# Patient Record
Sex: FEMALE | Race: WHITE | HISPANIC OR LATINO
[De-identification: names, ages, dates, MRNs, and addresses within clinical notes are randomized per-mention and may not be internally consistent; named-entity substitution may affect disease eponyms.]

---

## 2017-01-26 ENCOUNTER — LABORATORY RESULT (OUTPATIENT)
Age: 77
End: 2017-01-26

## 2017-01-26 ENCOUNTER — APPOINTMENT (OUTPATIENT)
Dept: INTERNAL MEDICINE | Facility: HOSPITAL | Age: 77
End: 2017-01-26

## 2017-01-26 ENCOUNTER — OUTPATIENT (OUTPATIENT)
Dept: OUTPATIENT SERVICES | Facility: HOSPITAL | Age: 77
LOS: 1 days | End: 2017-01-26

## 2017-01-26 ENCOUNTER — RESULT CHARGE (OUTPATIENT)
Age: 77
End: 2017-01-26

## 2017-01-26 VITALS
HEIGHT: 63 IN | SYSTOLIC BLOOD PRESSURE: 120 MMHG | DIASTOLIC BLOOD PRESSURE: 60 MMHG | WEIGHT: 189 LBS | HEART RATE: 92 BPM | BODY MASS INDEX: 33.49 KG/M2

## 2017-01-26 DIAGNOSIS — Z90.711 ACQUIRED ABSENCE OF UTERUS WITH REMAINING CERVICAL STUMP: Chronic | ICD-10-CM

## 2017-01-26 DIAGNOSIS — E11.9 TYPE 2 DIABETES MELLITUS WITHOUT COMPLICATIONS: ICD-10-CM

## 2017-01-26 DIAGNOSIS — Z86.69 PERSONAL HISTORY OF OTHER DISEASES OF THE NERVOUS SYSTEM AND SENSE ORGANS: Chronic | ICD-10-CM

## 2017-01-26 LAB
ALBUMIN SERPL ELPH-MCNC: 4.1 G/DL — SIGNIFICANT CHANGE UP (ref 3.3–5)
ALP SERPL-CCNC: 70 U/L — SIGNIFICANT CHANGE UP (ref 40–120)
ALT FLD-CCNC: 17 U/L — SIGNIFICANT CHANGE UP (ref 4–33)
AST SERPL-CCNC: 20 U/L — SIGNIFICANT CHANGE UP (ref 4–32)
BASOPHILS # BLD AUTO: 0.03 K/UL — SIGNIFICANT CHANGE UP (ref 0–0.2)
BASOPHILS NFR BLD AUTO: 0.3 % — SIGNIFICANT CHANGE UP (ref 0–2)
BILIRUB SERPL-MCNC: 0.4 MG/DL — SIGNIFICANT CHANGE UP (ref 0.2–1.2)
BUN SERPL-MCNC: 16 MG/DL — SIGNIFICANT CHANGE UP (ref 7–23)
CALCIUM SERPL-MCNC: 9.6 MG/DL — SIGNIFICANT CHANGE UP (ref 8.4–10.5)
CHLORIDE SERPL-SCNC: 100 MMOL/L — SIGNIFICANT CHANGE UP (ref 98–107)
CO2 SERPL-SCNC: 24 MMOL/L — SIGNIFICANT CHANGE UP (ref 22–31)
CREAT SERPL-MCNC: 0.77 MG/DL — SIGNIFICANT CHANGE UP (ref 0.5–1.3)
EOSINOPHIL # BLD AUTO: 0.19 K/UL — SIGNIFICANT CHANGE UP (ref 0–0.5)
EOSINOPHIL NFR BLD AUTO: 1.9 % — SIGNIFICANT CHANGE UP (ref 0–6)
GLUCOSE BLDC GLUCOMTR-MCNC: 132
GLUCOSE SERPL-MCNC: 124 MG/DL — HIGH (ref 70–99)
HBA1C BLD-MCNC: 7 % — HIGH (ref 4–5.6)
HCT VFR BLD CALC: 42.5 % — SIGNIFICANT CHANGE UP (ref 34.5–45)
HGB BLD-MCNC: 13.5 G/DL — SIGNIFICANT CHANGE UP (ref 11.5–15.5)
IMM GRANULOCYTES NFR BLD AUTO: 0.2 % — SIGNIFICANT CHANGE UP (ref 0–1.5)
LYMPHOCYTES # BLD AUTO: 3.94 K/UL — HIGH (ref 1–3.3)
LYMPHOCYTES # BLD AUTO: 40.4 % — SIGNIFICANT CHANGE UP (ref 13–44)
MCHC RBC-ENTMCNC: 27.3 PG — SIGNIFICANT CHANGE UP (ref 27–34)
MCHC RBC-ENTMCNC: 31.8 % — LOW (ref 32–36)
MCV RBC AUTO: 86 FL — SIGNIFICANT CHANGE UP (ref 80–100)
MONOCYTES # BLD AUTO: 0.37 K/UL — SIGNIFICANT CHANGE UP (ref 0–0.9)
MONOCYTES NFR BLD AUTO: 3.8 % — SIGNIFICANT CHANGE UP (ref 2–14)
NEUTROPHILS # BLD AUTO: 5.2 K/UL — SIGNIFICANT CHANGE UP (ref 1.8–7.4)
NEUTROPHILS NFR BLD AUTO: 53.4 % — SIGNIFICANT CHANGE UP (ref 43–77)
PLATELET # BLD AUTO: 298 K/UL — SIGNIFICANT CHANGE UP (ref 150–400)
PMV BLD: 10.9 FL — SIGNIFICANT CHANGE UP (ref 7–13)
POTASSIUM SERPL-MCNC: 4 MMOL/L — SIGNIFICANT CHANGE UP (ref 3.5–5.3)
POTASSIUM SERPL-SCNC: 4 MMOL/L — SIGNIFICANT CHANGE UP (ref 3.5–5.3)
PROT SERPL-MCNC: 7.7 G/DL — SIGNIFICANT CHANGE UP (ref 6–8.3)
RBC # BLD: 4.94 M/UL — SIGNIFICANT CHANGE UP (ref 3.8–5.2)
RBC # FLD: 14.1 % — SIGNIFICANT CHANGE UP (ref 10.3–14.5)
SODIUM SERPL-SCNC: 139 MMOL/L — SIGNIFICANT CHANGE UP (ref 135–145)
WBC # BLD: 9.75 K/UL — SIGNIFICANT CHANGE UP (ref 3.8–10.5)
WBC # FLD AUTO: 9.75 K/UL — SIGNIFICANT CHANGE UP (ref 3.8–10.5)

## 2017-01-27 LAB
24R-OH-CALCIDIOL SERPL-MCNC: 14.3 NG/ML — LOW (ref 30–100)
CREAT UR-MCNC: 159 MG/DL — SIGNIFICANT CHANGE UP
MICROALBUMIN UR-MCNC: 3.8 MG/DL — SIGNIFICANT CHANGE UP
MICROALBUMIN/CREAT UR-RTO: 24 UG/MG — SIGNIFICANT CHANGE UP (ref 0–30)

## 2017-02-13 ENCOUNTER — RX RENEWAL (OUTPATIENT)
Age: 77
End: 2017-02-13

## 2017-02-27 ENCOUNTER — APPOINTMENT (OUTPATIENT)
Dept: INTERNAL MEDICINE | Facility: HOSPITAL | Age: 77
End: 2017-02-27

## 2017-02-27 ENCOUNTER — RESULT CHARGE (OUTPATIENT)
Age: 77
End: 2017-02-27

## 2017-02-27 ENCOUNTER — OUTPATIENT (OUTPATIENT)
Dept: OUTPATIENT SERVICES | Facility: HOSPITAL | Age: 77
LOS: 1 days | End: 2017-02-27

## 2017-02-27 VITALS
DIASTOLIC BLOOD PRESSURE: 68 MMHG | HEART RATE: 60 BPM | BODY MASS INDEX: 32.96 KG/M2 | WEIGHT: 186 LBS | HEIGHT: 63 IN | SYSTOLIC BLOOD PRESSURE: 102 MMHG

## 2017-02-27 DIAGNOSIS — Z86.69 PERSONAL HISTORY OF OTHER DISEASES OF THE NERVOUS SYSTEM AND SENSE ORGANS: Chronic | ICD-10-CM

## 2017-02-27 DIAGNOSIS — Z90.711 ACQUIRED ABSENCE OF UTERUS WITH REMAINING CERVICAL STUMP: Chronic | ICD-10-CM

## 2017-02-27 DIAGNOSIS — M54.5 LOW BACK PAIN: ICD-10-CM

## 2017-02-27 LAB — GLUCOSE BLDC GLUCOMTR-MCNC: 139

## 2017-02-27 RX ORDER — ERGOCALCIFEROL 1.25 MG/1
1.25 MG CAPSULE, LIQUID FILLED ORAL
Qty: 8 | Refills: 0 | Status: DISCONTINUED | COMMUNITY
Start: 2017-01-27 | End: 2017-02-27

## 2017-02-27 RX ORDER — MELOXICAM 15 MG/1
15 TABLET ORAL DAILY
Qty: 7 | Refills: 0 | Status: DISCONTINUED | COMMUNITY
Start: 2017-01-26 | End: 2017-02-27

## 2017-02-28 DIAGNOSIS — E11.40 TYPE 2 DIABETES MELLITUS WITH DIABETIC NEUROPATHY, UNSPECIFIED: ICD-10-CM

## 2017-02-28 DIAGNOSIS — E11.9 TYPE 2 DIABETES MELLITUS WITHOUT COMPLICATIONS: ICD-10-CM

## 2017-03-13 DIAGNOSIS — M54.5 LOW BACK PAIN: ICD-10-CM

## 2017-03-13 DIAGNOSIS — E66.9 OBESITY, UNSPECIFIED: ICD-10-CM

## 2017-03-13 DIAGNOSIS — I10 ESSENTIAL (PRIMARY) HYPERTENSION: ICD-10-CM

## 2017-03-13 DIAGNOSIS — R21 RASH AND OTHER NONSPECIFIC SKIN ERUPTION: ICD-10-CM

## 2017-03-17 ENCOUNTER — APPOINTMENT (OUTPATIENT)
Dept: DERMATOLOGY | Facility: HOSPITAL | Age: 77
End: 2017-03-17

## 2017-05-08 ENCOUNTER — OUTPATIENT (OUTPATIENT)
Dept: OUTPATIENT SERVICES | Facility: HOSPITAL | Age: 77
LOS: 1 days | End: 2017-05-08
Payer: MEDICARE

## 2017-05-08 ENCOUNTER — APPOINTMENT (OUTPATIENT)
Dept: INTERNAL MEDICINE | Facility: HOSPITAL | Age: 77
End: 2017-05-08

## 2017-05-08 VITALS — HEIGHT: 63 IN | WEIGHT: 187 LBS | BODY MASS INDEX: 33.13 KG/M2

## 2017-05-08 VITALS — DIASTOLIC BLOOD PRESSURE: 77 MMHG | HEART RATE: 84 BPM | SYSTOLIC BLOOD PRESSURE: 144 MMHG

## 2017-05-08 DIAGNOSIS — Z86.69 PERSONAL HISTORY OF OTHER DISEASES OF THE NERVOUS SYSTEM AND SENSE ORGANS: Chronic | ICD-10-CM

## 2017-05-08 DIAGNOSIS — Z87.2 PERSONAL HISTORY OF DISEASES OF THE SKIN AND SUBCUTANEOUS TISSUE: ICD-10-CM

## 2017-05-08 DIAGNOSIS — M25.511 PAIN IN RIGHT SHOULDER: ICD-10-CM

## 2017-05-08 DIAGNOSIS — R06.09 OTHER FORMS OF DYSPNEA: ICD-10-CM

## 2017-05-08 DIAGNOSIS — Z12.11 ENCOUNTER FOR SCREENING FOR MALIGNANT NEOPLASM OF COLON: ICD-10-CM

## 2017-05-08 DIAGNOSIS — N63 UNSPECIFIED LUMP IN BREAST: ICD-10-CM

## 2017-05-08 DIAGNOSIS — Z90.711 ACQUIRED ABSENCE OF UTERUS WITH REMAINING CERVICAL STUMP: Chronic | ICD-10-CM

## 2017-05-08 DIAGNOSIS — R21 RASH AND OTHER NONSPECIFIC SKIN ERUPTION: ICD-10-CM

## 2017-05-08 DIAGNOSIS — Z87.898 PERSONAL HISTORY OF OTHER SPECIFIED CONDITIONS: ICD-10-CM

## 2017-05-09 DIAGNOSIS — M54.5 LOW BACK PAIN: ICD-10-CM

## 2017-05-09 DIAGNOSIS — F41.9 ANXIETY DISORDER, UNSPECIFIED: ICD-10-CM

## 2017-05-09 DIAGNOSIS — E11.9 TYPE 2 DIABETES MELLITUS WITHOUT COMPLICATIONS: ICD-10-CM

## 2017-05-19 ENCOUNTER — OUTPATIENT (OUTPATIENT)
Dept: OUTPATIENT SERVICES | Facility: HOSPITAL | Age: 77
LOS: 1 days | End: 2017-05-19

## 2017-05-19 ENCOUNTER — APPOINTMENT (OUTPATIENT)
Dept: DERMATOLOGY | Facility: HOSPITAL | Age: 77
End: 2017-05-19

## 2017-05-19 VITALS
HEIGHT: 63 IN | DIASTOLIC BLOOD PRESSURE: 62 MMHG | WEIGHT: 187 LBS | SYSTOLIC BLOOD PRESSURE: 137 MMHG | HEART RATE: 97 BPM | BODY MASS INDEX: 33.13 KG/M2

## 2017-05-19 DIAGNOSIS — D22.9 MELANOCYTIC NEVI, UNSPECIFIED: ICD-10-CM

## 2017-05-19 DIAGNOSIS — L30.9 DERMATITIS, UNSPECIFIED: ICD-10-CM

## 2017-05-19 DIAGNOSIS — L21.9 SEBORRHEIC DERMATITIS, UNSPECIFIED: ICD-10-CM

## 2017-05-19 DIAGNOSIS — Z90.711 ACQUIRED ABSENCE OF UTERUS WITH REMAINING CERVICAL STUMP: Chronic | ICD-10-CM

## 2017-05-19 DIAGNOSIS — Z86.69 PERSONAL HISTORY OF OTHER DISEASES OF THE NERVOUS SYSTEM AND SENSE ORGANS: Chronic | ICD-10-CM

## 2017-06-01 ENCOUNTER — FORM ENCOUNTER (OUTPATIENT)
Age: 77
End: 2017-06-01

## 2017-06-02 ENCOUNTER — APPOINTMENT (OUTPATIENT)
Dept: RADIOLOGY | Facility: HOSPITAL | Age: 77
End: 2017-06-02

## 2017-06-02 PROCEDURE — 72100 X-RAY EXAM L-S SPINE 2/3 VWS: CPT | Mod: 26

## 2017-06-14 ENCOUNTER — APPOINTMENT (OUTPATIENT)
Dept: INTERNAL MEDICINE | Facility: HOSPITAL | Age: 77
End: 2017-06-14

## 2017-07-11 ENCOUNTER — RX RENEWAL (OUTPATIENT)
Age: 77
End: 2017-07-11

## 2017-09-07 ENCOUNTER — LABORATORY RESULT (OUTPATIENT)
Age: 77
End: 2017-09-07

## 2017-09-07 ENCOUNTER — OUTPATIENT (OUTPATIENT)
Dept: OUTPATIENT SERVICES | Facility: HOSPITAL | Age: 77
LOS: 1 days | End: 2017-09-07

## 2017-09-07 ENCOUNTER — APPOINTMENT (OUTPATIENT)
Dept: INTERNAL MEDICINE | Facility: HOSPITAL | Age: 77
End: 2017-09-07
Payer: MEDICARE

## 2017-09-07 VITALS — BODY MASS INDEX: 33.66 KG/M2 | WEIGHT: 190 LBS | HEIGHT: 63 IN

## 2017-09-07 VITALS — DIASTOLIC BLOOD PRESSURE: 76 MMHG | SYSTOLIC BLOOD PRESSURE: 134 MMHG

## 2017-09-07 DIAGNOSIS — E03.9 HYPOTHYROIDISM, UNSPECIFIED: ICD-10-CM

## 2017-09-07 DIAGNOSIS — Z86.69 PERSONAL HISTORY OF OTHER DISEASES OF THE NERVOUS SYSTEM AND SENSE ORGANS: Chronic | ICD-10-CM

## 2017-09-07 DIAGNOSIS — Z90.711 ACQUIRED ABSENCE OF UTERUS WITH REMAINING CERVICAL STUMP: Chronic | ICD-10-CM

## 2017-09-07 LAB
ALBUMIN SERPL ELPH-MCNC: 4.2 G/DL — SIGNIFICANT CHANGE UP (ref 3.3–5)
ALP SERPL-CCNC: 58 U/L — SIGNIFICANT CHANGE UP (ref 40–120)
ALT FLD-CCNC: 19 U/L — SIGNIFICANT CHANGE UP (ref 4–33)
AST SERPL-CCNC: 22 U/L — SIGNIFICANT CHANGE UP (ref 4–32)
BASOPHILS # BLD AUTO: 0.05 K/UL — SIGNIFICANT CHANGE UP (ref 0–0.2)
BASOPHILS NFR BLD AUTO: 0.5 % — SIGNIFICANT CHANGE UP (ref 0–2)
BILIRUB SERPL-MCNC: 0.3 MG/DL — SIGNIFICANT CHANGE UP (ref 0.2–1.2)
BUN SERPL-MCNC: 11 MG/DL — SIGNIFICANT CHANGE UP (ref 7–23)
CALCIUM SERPL-MCNC: 9.7 MG/DL — SIGNIFICANT CHANGE UP (ref 8.4–10.5)
CHLORIDE SERPL-SCNC: 98 MMOL/L — SIGNIFICANT CHANGE UP (ref 98–107)
CHOLEST SERPL-MCNC: 197 MG/DL — SIGNIFICANT CHANGE UP (ref 120–199)
CO2 SERPL-SCNC: 27 MMOL/L — SIGNIFICANT CHANGE UP (ref 22–31)
CREAT SERPL-MCNC: 0.95 MG/DL — SIGNIFICANT CHANGE UP (ref 0.5–1.3)
EOSINOPHIL # BLD AUTO: 0.11 K/UL — SIGNIFICANT CHANGE UP (ref 0–0.5)
EOSINOPHIL NFR BLD AUTO: 1 % — SIGNIFICANT CHANGE UP (ref 0–6)
GLUCOSE SERPL-MCNC: 123 MG/DL — HIGH (ref 70–99)
HBA1C BLD-MCNC: 7.3 % — HIGH (ref 4–5.6)
HCT VFR BLD CALC: 42.7 % — SIGNIFICANT CHANGE UP (ref 34.5–45)
HDLC SERPL-MCNC: 69 MG/DL — HIGH (ref 45–65)
HGB BLD-MCNC: 13.5 G/DL — SIGNIFICANT CHANGE UP (ref 11.5–15.5)
IMM GRANULOCYTES # BLD AUTO: 0.02 # — SIGNIFICANT CHANGE UP
IMM GRANULOCYTES NFR BLD AUTO: 0.2 % — SIGNIFICANT CHANGE UP (ref 0–1.5)
LIPID PNL WITH DIRECT LDL SERPL: 120 MG/DL — SIGNIFICANT CHANGE UP
LYMPHOCYTES # BLD AUTO: 4.67 K/UL — HIGH (ref 1–3.3)
LYMPHOCYTES # BLD AUTO: 44.5 % — HIGH (ref 13–44)
MCHC RBC-ENTMCNC: 26.9 PG — LOW (ref 27–34)
MCHC RBC-ENTMCNC: 31.6 % — LOW (ref 32–36)
MCV RBC AUTO: 85.1 FL — SIGNIFICANT CHANGE UP (ref 80–100)
MONOCYTES # BLD AUTO: 0.55 K/UL — SIGNIFICANT CHANGE UP (ref 0–0.9)
MONOCYTES NFR BLD AUTO: 5.2 % — SIGNIFICANT CHANGE UP (ref 2–14)
NEUTROPHILS # BLD AUTO: 5.1 K/UL — SIGNIFICANT CHANGE UP (ref 1.8–7.4)
NEUTROPHILS NFR BLD AUTO: 48.6 % — SIGNIFICANT CHANGE UP (ref 43–77)
NRBC # FLD: 0 — SIGNIFICANT CHANGE UP
PLATELET # BLD AUTO: 316 K/UL — SIGNIFICANT CHANGE UP (ref 150–400)
PMV BLD: 10.4 FL — SIGNIFICANT CHANGE UP (ref 7–13)
POTASSIUM SERPL-MCNC: 3.6 MMOL/L — SIGNIFICANT CHANGE UP (ref 3.5–5.3)
POTASSIUM SERPL-SCNC: 3.6 MMOL/L — SIGNIFICANT CHANGE UP (ref 3.5–5.3)
PROT SERPL-MCNC: 7.8 G/DL — SIGNIFICANT CHANGE UP (ref 6–8.3)
RBC # BLD: 5.02 M/UL — SIGNIFICANT CHANGE UP (ref 3.8–5.2)
RBC # FLD: 14.2 % — SIGNIFICANT CHANGE UP (ref 10.3–14.5)
SODIUM SERPL-SCNC: 141 MMOL/L — SIGNIFICANT CHANGE UP (ref 135–145)
TRIGL SERPL-MCNC: 129 MG/DL — SIGNIFICANT CHANGE UP (ref 10–149)
TSH SERPL-MCNC: 3.59 UIU/ML — SIGNIFICANT CHANGE UP (ref 0.27–4.2)
WBC # BLD: 10.5 K/UL — SIGNIFICANT CHANGE UP (ref 3.8–10.5)
WBC # FLD AUTO: 10.5 K/UL — SIGNIFICANT CHANGE UP (ref 3.8–10.5)

## 2017-09-07 PROCEDURE — 99214 OFFICE O/P EST MOD 30 MIN: CPT | Mod: GC

## 2017-09-08 DIAGNOSIS — Z23 ENCOUNTER FOR IMMUNIZATION: ICD-10-CM

## 2017-09-08 LAB — GLUCOSE BLDC GLUCOMTR-MCNC: 121

## 2017-09-11 DIAGNOSIS — Z00.00 ENCOUNTER FOR GENERAL ADULT MEDICAL EXAMINATION WITHOUT ABNORMAL FINDINGS: ICD-10-CM

## 2017-09-11 DIAGNOSIS — E11.40 TYPE 2 DIABETES MELLITUS WITH DIABETIC NEUROPATHY, UNSPECIFIED: ICD-10-CM

## 2017-09-15 ENCOUNTER — APPOINTMENT (OUTPATIENT)
Dept: INTERNAL MEDICINE | Facility: HOSPITAL | Age: 77
End: 2017-09-15

## 2017-09-25 ENCOUNTER — OUTPATIENT (OUTPATIENT)
Dept: OUTPATIENT SERVICES | Facility: HOSPITAL | Age: 77
LOS: 1 days | End: 2017-09-25

## 2017-09-25 ENCOUNTER — APPOINTMENT (OUTPATIENT)
Dept: INTERNAL MEDICINE | Facility: HOSPITAL | Age: 77
End: 2017-09-25
Payer: MEDICARE

## 2017-09-25 VITALS — WEIGHT: 186 LBS | HEIGHT: 63 IN | BODY MASS INDEX: 32.96 KG/M2

## 2017-09-25 VITALS — HEART RATE: 72 BPM | SYSTOLIC BLOOD PRESSURE: 122 MMHG | DIASTOLIC BLOOD PRESSURE: 80 MMHG

## 2017-09-25 DIAGNOSIS — E03.9 HYPOTHYROIDISM, UNSPECIFIED: ICD-10-CM

## 2017-09-25 DIAGNOSIS — Z00.00 ENCOUNTER FOR GENERAL ADULT MEDICAL EXAMINATION WITHOUT ABNORMAL FINDINGS: ICD-10-CM

## 2017-09-25 DIAGNOSIS — Z86.69 PERSONAL HISTORY OF OTHER DISEASES OF THE NERVOUS SYSTEM AND SENSE ORGANS: Chronic | ICD-10-CM

## 2017-09-25 DIAGNOSIS — Z90.711 ACQUIRED ABSENCE OF UTERUS WITH REMAINING CERVICAL STUMP: Chronic | ICD-10-CM

## 2017-09-25 DIAGNOSIS — E11.9 TYPE 2 DIABETES MELLITUS WITHOUT COMPLICATIONS: ICD-10-CM

## 2017-09-25 LAB — GLUCOSE BLDC GLUCOMTR-MCNC: 185

## 2017-09-25 PROCEDURE — 99214 OFFICE O/P EST MOD 30 MIN: CPT | Mod: GC

## 2017-09-26 ENCOUNTER — APPOINTMENT (OUTPATIENT)
Dept: INTERNAL MEDICINE | Facility: HOSPITAL | Age: 77
End: 2017-09-26

## 2017-10-26 ENCOUNTER — APPOINTMENT (OUTPATIENT)
Dept: GASTROENTEROLOGY | Facility: HOSPITAL | Age: 77
End: 2017-10-26

## 2017-11-20 ENCOUNTER — RX RENEWAL (OUTPATIENT)
Age: 77
End: 2017-11-20

## 2017-12-06 ENCOUNTER — APPOINTMENT (OUTPATIENT)
Dept: INTERNAL MEDICINE | Facility: HOSPITAL | Age: 77
End: 2017-12-06

## 2018-01-31 ENCOUNTER — RX RENEWAL (OUTPATIENT)
Age: 78
End: 2018-01-31

## 2018-02-06 ENCOUNTER — RX RENEWAL (OUTPATIENT)
Age: 78
End: 2018-02-06

## 2018-02-14 ENCOUNTER — LABORATORY RESULT (OUTPATIENT)
Age: 78
End: 2018-02-14

## 2018-02-14 ENCOUNTER — OUTPATIENT (OUTPATIENT)
Dept: OUTPATIENT SERVICES | Facility: HOSPITAL | Age: 78
LOS: 1 days | End: 2018-02-14

## 2018-02-14 ENCOUNTER — APPOINTMENT (OUTPATIENT)
Dept: INTERNAL MEDICINE | Facility: HOSPITAL | Age: 78
End: 2018-02-14
Payer: MEDICARE

## 2018-02-14 VITALS
BODY MASS INDEX: 32.6 KG/M2 | HEART RATE: 102 BPM | SYSTOLIC BLOOD PRESSURE: 102 MMHG | WEIGHT: 184 LBS | OXYGEN SATURATION: 99 % | HEIGHT: 63 IN | DIASTOLIC BLOOD PRESSURE: 62 MMHG

## 2018-02-14 VITALS — DIASTOLIC BLOOD PRESSURE: 64 MMHG | SYSTOLIC BLOOD PRESSURE: 104 MMHG

## 2018-02-14 DIAGNOSIS — Z90.711 ACQUIRED ABSENCE OF UTERUS WITH REMAINING CERVICAL STUMP: Chronic | ICD-10-CM

## 2018-02-14 DIAGNOSIS — F51.05 INSOMNIA DUE TO OTHER MENTAL DISORDER: ICD-10-CM

## 2018-02-14 DIAGNOSIS — Z86.69 PERSONAL HISTORY OF OTHER DISEASES OF THE NERVOUS SYSTEM AND SENSE ORGANS: Chronic | ICD-10-CM

## 2018-02-14 DIAGNOSIS — K08.89 OTHER SPECIFIED DISORDERS OF TEETH AND SUPPORTING STRUCTURES: ICD-10-CM

## 2018-02-14 DIAGNOSIS — F99 INSOMNIA DUE TO OTHER MENTAL DISORDER: ICD-10-CM

## 2018-02-14 DIAGNOSIS — E55.9 VITAMIN D DEFICIENCY, UNSPECIFIED: ICD-10-CM

## 2018-02-14 LAB
ALBUMIN SERPL ELPH-MCNC: 4.4 G/DL — SIGNIFICANT CHANGE UP (ref 3.3–5)
ALP SERPL-CCNC: 65 U/L — SIGNIFICANT CHANGE UP (ref 40–120)
ALT FLD-CCNC: 16 U/L — SIGNIFICANT CHANGE UP (ref 4–33)
ANISOCYTOSIS BLD QL: SLIGHT — SIGNIFICANT CHANGE UP
AST SERPL-CCNC: 20 U/L — SIGNIFICANT CHANGE UP (ref 4–32)
BASOPHILS # BLD AUTO: 0.07 K/UL — SIGNIFICANT CHANGE UP (ref 0–0.2)
BASOPHILS NFR BLD AUTO: 0.5 % — SIGNIFICANT CHANGE UP (ref 0–2)
BASOPHILS NFR SPEC: 1 % — SIGNIFICANT CHANGE UP (ref 0–2)
BILIRUB SERPL-MCNC: 0.3 MG/DL — SIGNIFICANT CHANGE UP (ref 0.2–1.2)
BUN SERPL-MCNC: 14 MG/DL — SIGNIFICANT CHANGE UP (ref 7–23)
CALCIUM SERPL-MCNC: 9.9 MG/DL — SIGNIFICANT CHANGE UP (ref 8.4–10.5)
CHLORIDE SERPL-SCNC: 98 MMOL/L — SIGNIFICANT CHANGE UP (ref 98–107)
CO2 SERPL-SCNC: 26 MMOL/L — SIGNIFICANT CHANGE UP (ref 22–31)
CREAT SERPL-MCNC: 1.01 MG/DL — SIGNIFICANT CHANGE UP (ref 0.5–1.3)
EOSINOPHIL # BLD AUTO: 0.16 K/UL — SIGNIFICANT CHANGE UP (ref 0–0.5)
EOSINOPHIL NFR BLD AUTO: 1.2 % — SIGNIFICANT CHANGE UP (ref 0–6)
EOSINOPHIL NFR FLD: 3 % — SIGNIFICANT CHANGE UP (ref 0–6)
GIANT PLATELETS BLD QL SMEAR: PRESENT — SIGNIFICANT CHANGE UP
GLUCOSE BLDC GLUCOMTR-MCNC: 165
GLUCOSE SERPL-MCNC: 160 MG/DL — HIGH (ref 70–99)
HBA1C BLD-MCNC: 7.1 % — HIGH (ref 4–5.6)
HCT VFR BLD CALC: 44.4 % — SIGNIFICANT CHANGE UP (ref 34.5–45)
HGB BLD-MCNC: 13.8 G/DL — SIGNIFICANT CHANGE UP (ref 11.5–15.5)
HYPOCHROMIA BLD QL: SLIGHT — SIGNIFICANT CHANGE UP
IMM GRANULOCYTES # BLD AUTO: 0.04 # — SIGNIFICANT CHANGE UP
IMM GRANULOCYTES NFR BLD AUTO: 0.3 % — SIGNIFICANT CHANGE UP (ref 0–1.5)
LYMPHOCYTES # BLD AUTO: 45 % — HIGH (ref 13–44)
LYMPHOCYTES # BLD AUTO: 5.79 K/UL — HIGH (ref 1–3.3)
LYMPHOCYTES NFR SPEC AUTO: 45 % — HIGH (ref 13–44)
MAGNESIUM SERPL-MCNC: 1.6 MG/DL — SIGNIFICANT CHANGE UP (ref 1.6–2.6)
MANUAL SMEAR VERIFICATION: SIGNIFICANT CHANGE UP
MCHC RBC-ENTMCNC: 26.3 PG — LOW (ref 27–34)
MCHC RBC-ENTMCNC: 31.1 % — LOW (ref 32–36)
MCV RBC AUTO: 84.7 FL — SIGNIFICANT CHANGE UP (ref 80–100)
MONOCYTES # BLD AUTO: 0.82 K/UL — SIGNIFICANT CHANGE UP (ref 0–0.9)
MONOCYTES NFR BLD AUTO: 6.4 % — SIGNIFICANT CHANGE UP (ref 2–14)
MONOCYTES NFR BLD: 5 % — SIGNIFICANT CHANGE UP (ref 2–9)
NEUTROPHIL AB SER-ACNC: 46 % — SIGNIFICANT CHANGE UP (ref 43–77)
NEUTROPHILS # BLD AUTO: 5.99 K/UL — SIGNIFICANT CHANGE UP (ref 1.8–7.4)
NEUTROPHILS NFR BLD AUTO: 46.6 % — SIGNIFICANT CHANGE UP (ref 43–77)
NRBC # BLD: 0 /100WBC — SIGNIFICANT CHANGE UP
NRBC # FLD: 0 — SIGNIFICANT CHANGE UP
PHOSPHATE SERPL-MCNC: 3 MG/DL — SIGNIFICANT CHANGE UP (ref 2.5–4.5)
PLATELET # BLD AUTO: 347 K/UL — SIGNIFICANT CHANGE UP (ref 150–400)
PLATELET COUNT - ESTIMATE: NORMAL — SIGNIFICANT CHANGE UP
PMV BLD: 10.3 FL — SIGNIFICANT CHANGE UP (ref 7–13)
POTASSIUM SERPL-MCNC: 3.9 MMOL/L — SIGNIFICANT CHANGE UP (ref 3.5–5.3)
POTASSIUM SERPL-SCNC: 3.9 MMOL/L — SIGNIFICANT CHANGE UP (ref 3.5–5.3)
PROT SERPL-MCNC: 7.9 G/DL — SIGNIFICANT CHANGE UP (ref 6–8.3)
RBC # BLD: 5.24 M/UL — HIGH (ref 3.8–5.2)
RBC # FLD: 13.9 % — SIGNIFICANT CHANGE UP (ref 10.3–14.5)
SODIUM SERPL-SCNC: 142 MMOL/L — SIGNIFICANT CHANGE UP (ref 135–145)
WBC # BLD: 12.87 K/UL — HIGH (ref 3.8–10.5)
WBC # FLD AUTO: 12.87 K/UL — HIGH (ref 3.8–10.5)

## 2018-02-14 PROCEDURE — 99214 OFFICE O/P EST MOD 30 MIN: CPT | Mod: GC

## 2018-02-14 RX ORDER — UBIDECARENONE/VIT E ACET 100MG-5
50 MCG CAPSULE ORAL
Qty: 90 | Refills: 3 | Status: DISCONTINUED | COMMUNITY
Start: 2017-01-26 | End: 2018-02-14

## 2018-02-15 DIAGNOSIS — K08.89 OTHER SPECIFIED DISORDERS OF TEETH AND SUPPORTING STRUCTURES: ICD-10-CM

## 2018-02-15 DIAGNOSIS — K52.9 NONINFECTIVE GASTROENTERITIS AND COLITIS, UNSPECIFIED: ICD-10-CM

## 2018-02-15 DIAGNOSIS — F33.2 MAJOR DEPRESSIVE DISORDER, RECURRENT SEVERE WITHOUT PSYCHOTIC FEATURES: ICD-10-CM

## 2018-02-15 DIAGNOSIS — E11.40 TYPE 2 DIABETES MELLITUS WITH DIABETIC NEUROPATHY, UNSPECIFIED: ICD-10-CM

## 2018-02-15 DIAGNOSIS — F51.9 SLEEP DISORDER NOT DUE TO A SUBSTANCE OR KNOWN PHYSIOLOGICAL CONDITION, UNSPECIFIED: ICD-10-CM

## 2018-02-15 DIAGNOSIS — Z00.00 ENCOUNTER FOR GENERAL ADULT MEDICAL EXAMINATION WITHOUT ABNORMAL FINDINGS: ICD-10-CM

## 2018-02-15 DIAGNOSIS — E11.9 TYPE 2 DIABETES MELLITUS WITHOUT COMPLICATIONS: ICD-10-CM

## 2018-02-15 DIAGNOSIS — I10 ESSENTIAL (PRIMARY) HYPERTENSION: ICD-10-CM

## 2018-02-15 DIAGNOSIS — R42 DIZZINESS AND GIDDINESS: ICD-10-CM

## 2018-02-15 LAB
24R-OH-CALCIDIOL SERPL-MCNC: 38.1 NG/ML — SIGNIFICANT CHANGE UP (ref 30–80)
CREAT UR-MCNC: 223 MG/DL — SIGNIFICANT CHANGE UP
MICROALBUMIN UR-MCNC: 4.2 MG/DL — SIGNIFICANT CHANGE UP
MICROALBUMIN/CREAT UR-RTO: 19 MG/G — SIGNIFICANT CHANGE UP (ref 0–30)

## 2018-03-11 ENCOUNTER — FORM ENCOUNTER (OUTPATIENT)
Age: 78
End: 2018-03-11

## 2018-03-12 ENCOUNTER — OUTPATIENT (OUTPATIENT)
Dept: OUTPATIENT SERVICES | Facility: HOSPITAL | Age: 78
LOS: 1 days | End: 2018-03-12
Payer: COMMERCIAL

## 2018-03-12 ENCOUNTER — APPOINTMENT (OUTPATIENT)
Dept: MAMMOGRAPHY | Facility: IMAGING CENTER | Age: 78
End: 2018-03-12
Payer: MEDICARE

## 2018-03-12 ENCOUNTER — APPOINTMENT (OUTPATIENT)
Dept: RADIOLOGY | Facility: IMAGING CENTER | Age: 78
End: 2018-03-12
Payer: MEDICARE

## 2018-03-12 DIAGNOSIS — Z90.711 ACQUIRED ABSENCE OF UTERUS WITH REMAINING CERVICAL STUMP: Chronic | ICD-10-CM

## 2018-03-12 DIAGNOSIS — Z00.00 ENCOUNTER FOR GENERAL ADULT MEDICAL EXAMINATION WITHOUT ABNORMAL FINDINGS: ICD-10-CM

## 2018-03-12 DIAGNOSIS — Z86.69 PERSONAL HISTORY OF OTHER DISEASES OF THE NERVOUS SYSTEM AND SENSE ORGANS: Chronic | ICD-10-CM

## 2018-03-12 PROCEDURE — 77063 BREAST TOMOSYNTHESIS BI: CPT | Mod: 26

## 2018-03-12 PROCEDURE — 77080 DXA BONE DENSITY AXIAL: CPT | Mod: 26

## 2018-03-12 PROCEDURE — 77067 SCR MAMMO BI INCL CAD: CPT | Mod: 26

## 2018-03-12 PROCEDURE — 77067 SCR MAMMO BI INCL CAD: CPT

## 2018-03-12 PROCEDURE — 77063 BREAST TOMOSYNTHESIS BI: CPT

## 2018-03-12 PROCEDURE — 77080 DXA BONE DENSITY AXIAL: CPT

## 2018-03-21 ENCOUNTER — APPOINTMENT (OUTPATIENT)
Dept: INTERNAL MEDICINE | Facility: HOSPITAL | Age: 78
End: 2018-03-21

## 2018-03-21 DIAGNOSIS — Z63.4 DISAPPEARANCE AND DEATH OF FAMILY MEMBER: ICD-10-CM

## 2018-03-21 SDOH — SOCIAL STABILITY - SOCIAL INSECURITY: DISSAPEARANCE AND DEATH OF FAMILY MEMBER: Z63.4

## 2018-04-09 ENCOUNTER — RX RENEWAL (OUTPATIENT)
Age: 78
End: 2018-04-09

## 2018-04-12 ENCOUNTER — APPOINTMENT (OUTPATIENT)
Dept: GASTROENTEROLOGY | Facility: HOSPITAL | Age: 78
End: 2018-04-12

## 2018-04-23 ENCOUNTER — OUTPATIENT (OUTPATIENT)
Dept: OUTPATIENT SERVICES | Facility: HOSPITAL | Age: 78
LOS: 1 days | End: 2018-04-23

## 2018-04-23 ENCOUNTER — APPOINTMENT (OUTPATIENT)
Dept: INTERNAL MEDICINE | Facility: HOSPITAL | Age: 78
End: 2018-04-23
Payer: MEDICARE

## 2018-04-23 ENCOUNTER — LABORATORY RESULT (OUTPATIENT)
Age: 78
End: 2018-04-23

## 2018-04-23 VITALS
BODY MASS INDEX: 31.71 KG/M2 | DIASTOLIC BLOOD PRESSURE: 80 MMHG | SYSTOLIC BLOOD PRESSURE: 110 MMHG | HEART RATE: 92 BPM | WEIGHT: 179 LBS | HEIGHT: 63 IN

## 2018-04-23 DIAGNOSIS — Z90.711 ACQUIRED ABSENCE OF UTERUS WITH REMAINING CERVICAL STUMP: Chronic | ICD-10-CM

## 2018-04-23 DIAGNOSIS — Z86.69 PERSONAL HISTORY OF OTHER DISEASES OF THE NERVOUS SYSTEM AND SENSE ORGANS: Chronic | ICD-10-CM

## 2018-04-23 LAB
BASOPHILS # BLD AUTO: 0.06 K/UL — SIGNIFICANT CHANGE UP (ref 0–0.2)
BASOPHILS NFR BLD AUTO: 0.6 % — SIGNIFICANT CHANGE UP (ref 0–2)
BUN SERPL-MCNC: 12 MG/DL — SIGNIFICANT CHANGE UP (ref 7–23)
CALCIUM SERPL-MCNC: 9.4 MG/DL — SIGNIFICANT CHANGE UP (ref 8.4–10.5)
CHLORIDE SERPL-SCNC: 100 MMOL/L — SIGNIFICANT CHANGE UP (ref 98–107)
CO2 SERPL-SCNC: 25 MMOL/L — SIGNIFICANT CHANGE UP (ref 22–31)
CREAT SERPL-MCNC: 0.88 MG/DL — SIGNIFICANT CHANGE UP (ref 0.5–1.3)
EOSINOPHIL # BLD AUTO: 0.12 K/UL — SIGNIFICANT CHANGE UP (ref 0–0.5)
EOSINOPHIL NFR BLD AUTO: 1.2 % — SIGNIFICANT CHANGE UP (ref 0–6)
GLUCOSE BLDC GLUCOMTR-MCNC: 167
GLUCOSE SERPL-MCNC: 156 MG/DL — HIGH (ref 70–99)
HCT VFR BLD CALC: 42.6 % — SIGNIFICANT CHANGE UP (ref 34.5–45)
HGB BLD-MCNC: 13.4 G/DL — SIGNIFICANT CHANGE UP (ref 11.5–15.5)
IMM GRANULOCYTES # BLD AUTO: 0.02 # — SIGNIFICANT CHANGE UP
IMM GRANULOCYTES NFR BLD AUTO: 0.2 % — SIGNIFICANT CHANGE UP (ref 0–1.5)
LYMPHOCYTES # BLD AUTO: 4.28 K/UL — HIGH (ref 1–3.3)
LYMPHOCYTES # BLD AUTO: 41.9 % — SIGNIFICANT CHANGE UP (ref 13–44)
MAGNESIUM SERPL-MCNC: 1.6 MG/DL — SIGNIFICANT CHANGE UP (ref 1.6–2.6)
MCHC RBC-ENTMCNC: 26.6 PG — LOW (ref 27–34)
MCHC RBC-ENTMCNC: 31.5 % — LOW (ref 32–36)
MCV RBC AUTO: 84.5 FL — SIGNIFICANT CHANGE UP (ref 80–100)
MONOCYTES # BLD AUTO: 0.62 K/UL — SIGNIFICANT CHANGE UP (ref 0–0.9)
MONOCYTES NFR BLD AUTO: 6.1 % — SIGNIFICANT CHANGE UP (ref 2–14)
NEUTROPHILS # BLD AUTO: 5.12 K/UL — SIGNIFICANT CHANGE UP (ref 1.8–7.4)
NEUTROPHILS NFR BLD AUTO: 50 % — SIGNIFICANT CHANGE UP (ref 43–77)
NRBC # FLD: 0 — SIGNIFICANT CHANGE UP
PLATELET # BLD AUTO: 315 K/UL — SIGNIFICANT CHANGE UP (ref 150–400)
PMV BLD: 10.5 FL — SIGNIFICANT CHANGE UP (ref 7–13)
POTASSIUM SERPL-MCNC: 4.2 MMOL/L — SIGNIFICANT CHANGE UP (ref 3.5–5.3)
POTASSIUM SERPL-SCNC: 4.2 MMOL/L — SIGNIFICANT CHANGE UP (ref 3.5–5.3)
RBC # BLD: 5.04 M/UL — SIGNIFICANT CHANGE UP (ref 3.8–5.2)
RBC # FLD: 13.6 % — SIGNIFICANT CHANGE UP (ref 10.3–14.5)
SODIUM SERPL-SCNC: 140 MMOL/L — SIGNIFICANT CHANGE UP (ref 135–145)
WBC # BLD: 10.22 K/UL — SIGNIFICANT CHANGE UP (ref 3.8–10.5)
WBC # FLD AUTO: 10.22 K/UL — SIGNIFICANT CHANGE UP (ref 3.8–10.5)

## 2018-04-23 PROCEDURE — 99213 OFFICE O/P EST LOW 20 MIN: CPT | Mod: GE

## 2018-04-27 DIAGNOSIS — G47.33 OBSTRUCTIVE SLEEP APNEA (ADULT) (PEDIATRIC): ICD-10-CM

## 2018-04-27 DIAGNOSIS — E11.40 TYPE 2 DIABETES MELLITUS WITH DIABETIC NEUROPATHY, UNSPECIFIED: ICD-10-CM

## 2018-04-27 DIAGNOSIS — E03.9 HYPOTHYROIDISM, UNSPECIFIED: ICD-10-CM

## 2018-04-27 DIAGNOSIS — F33.2 MAJOR DEPRESSIVE DISORDER, RECURRENT SEVERE WITHOUT PSYCHOTIC FEATURES: ICD-10-CM

## 2018-04-27 DIAGNOSIS — M85.80 OTHER SPECIFIED DISORDERS OF BONE DENSITY AND STRUCTURE, UNSPECIFIED SITE: ICD-10-CM

## 2018-04-27 DIAGNOSIS — I10 ESSENTIAL (PRIMARY) HYPERTENSION: ICD-10-CM

## 2018-04-27 DIAGNOSIS — K52.9 NONINFECTIVE GASTROENTERITIS AND COLITIS, UNSPECIFIED: ICD-10-CM

## 2018-04-30 ENCOUNTER — APPOINTMENT (OUTPATIENT)
Dept: OPHTHALMOLOGY | Facility: CLINIC | Age: 78
End: 2018-04-30

## 2018-06-11 ENCOUNTER — APPOINTMENT (OUTPATIENT)
Dept: OPHTHALMOLOGY | Facility: CLINIC | Age: 78
End: 2018-06-11

## 2018-08-07 ENCOUNTER — OTHER (OUTPATIENT)
Age: 78
End: 2018-08-07

## 2018-08-09 ENCOUNTER — RX RENEWAL (OUTPATIENT)
Age: 78
End: 2018-08-09

## 2018-08-16 ENCOUNTER — APPOINTMENT (OUTPATIENT)
Dept: GASTROENTEROLOGY | Facility: HOSPITAL | Age: 78
End: 2018-08-16

## 2018-08-16 ENCOUNTER — OUTPATIENT (OUTPATIENT)
Dept: OUTPATIENT SERVICES | Facility: HOSPITAL | Age: 78
LOS: 1 days | End: 2018-08-16

## 2018-08-16 ENCOUNTER — OUTPATIENT (OUTPATIENT)
Dept: OUTPATIENT SERVICES | Facility: HOSPITAL | Age: 78
LOS: 1 days | Discharge: STILL ADMITTED OR ADMINISTRATIVE | End: 2018-08-16

## 2018-08-16 ENCOUNTER — LABORATORY RESULT (OUTPATIENT)
Age: 78
End: 2018-08-16

## 2018-08-16 VITALS
HEART RATE: 90 BPM | DIASTOLIC BLOOD PRESSURE: 62 MMHG | BODY MASS INDEX: 31.36 KG/M2 | WEIGHT: 177 LBS | SYSTOLIC BLOOD PRESSURE: 132 MMHG | HEIGHT: 63 IN

## 2018-08-16 DIAGNOSIS — Z86.69 PERSONAL HISTORY OF OTHER DISEASES OF THE NERVOUS SYSTEM AND SENSE ORGANS: Chronic | ICD-10-CM

## 2018-08-16 DIAGNOSIS — Z90.711 ACQUIRED ABSENCE OF UTERUS WITH REMAINING CERVICAL STUMP: Chronic | ICD-10-CM

## 2018-08-16 DIAGNOSIS — K52.9 NONINFECTIVE GASTROENTERITIS AND COLITIS, UNSPECIFIED: ICD-10-CM

## 2018-08-16 LAB
ALBUMIN SERPL ELPH-MCNC: 4.3 G/DL — SIGNIFICANT CHANGE UP (ref 3.3–5)
ALP SERPL-CCNC: 75 U/L — SIGNIFICANT CHANGE UP (ref 40–120)
ALT FLD-CCNC: 15 U/L — SIGNIFICANT CHANGE UP (ref 4–33)
AST SERPL-CCNC: 20 U/L — SIGNIFICANT CHANGE UP (ref 4–32)
BASOPHILS # BLD AUTO: 0.06 K/UL — SIGNIFICANT CHANGE UP (ref 0–0.2)
BASOPHILS NFR BLD AUTO: 0.6 % — SIGNIFICANT CHANGE UP (ref 0–2)
BILIRUB SERPL-MCNC: 0.4 MG/DL — SIGNIFICANT CHANGE UP (ref 0.2–1.2)
BUN SERPL-MCNC: 15 MG/DL — SIGNIFICANT CHANGE UP (ref 7–23)
CALCIUM SERPL-MCNC: 10.6 MG/DL — HIGH (ref 8.4–10.5)
CHLORIDE SERPL-SCNC: 101 MMOL/L — SIGNIFICANT CHANGE UP (ref 98–107)
CO2 SERPL-SCNC: 25 MMOL/L — SIGNIFICANT CHANGE UP (ref 22–31)
CREAT SERPL-MCNC: 1.03 MG/DL — SIGNIFICANT CHANGE UP (ref 0.5–1.3)
EOSINOPHIL # BLD AUTO: 0.2 K/UL — SIGNIFICANT CHANGE UP (ref 0–0.5)
EOSINOPHIL NFR BLD AUTO: 2 % — SIGNIFICANT CHANGE UP (ref 0–6)
GLUCOSE SERPL-MCNC: 198 MG/DL — HIGH (ref 70–99)
HCT VFR BLD CALC: 42.3 % — SIGNIFICANT CHANGE UP (ref 34.5–45)
HGB BLD-MCNC: 13.3 G/DL — SIGNIFICANT CHANGE UP (ref 11.5–15.5)
IMM GRANULOCYTES # BLD AUTO: 0.02 # — SIGNIFICANT CHANGE UP
IMM GRANULOCYTES NFR BLD AUTO: 0.2 % — SIGNIFICANT CHANGE UP (ref 0–1.5)
LYMPHOCYTES # BLD AUTO: 4.14 K/UL — HIGH (ref 1–3.3)
LYMPHOCYTES # BLD AUTO: 41.7 % — SIGNIFICANT CHANGE UP (ref 13–44)
MCHC RBC-ENTMCNC: 26.9 PG — LOW (ref 27–34)
MCHC RBC-ENTMCNC: 31.4 % — LOW (ref 32–36)
MCV RBC AUTO: 85.6 FL — SIGNIFICANT CHANGE UP (ref 80–100)
MONOCYTES # BLD AUTO: 0.6 K/UL — SIGNIFICANT CHANGE UP (ref 0–0.9)
MONOCYTES NFR BLD AUTO: 6 % — SIGNIFICANT CHANGE UP (ref 2–14)
NEUTROPHILS # BLD AUTO: 4.91 K/UL — SIGNIFICANT CHANGE UP (ref 1.8–7.4)
NEUTROPHILS NFR BLD AUTO: 49.5 % — SIGNIFICANT CHANGE UP (ref 43–77)
NRBC # FLD: 0 — SIGNIFICANT CHANGE UP
PLATELET # BLD AUTO: 312 K/UL — SIGNIFICANT CHANGE UP (ref 150–400)
PMV BLD: 10.3 FL — SIGNIFICANT CHANGE UP (ref 7–13)
POTASSIUM SERPL-MCNC: 4.5 MMOL/L — SIGNIFICANT CHANGE UP (ref 3.5–5.3)
POTASSIUM SERPL-SCNC: 4.5 MMOL/L — SIGNIFICANT CHANGE UP (ref 3.5–5.3)
PROT SERPL-MCNC: 8 G/DL — SIGNIFICANT CHANGE UP (ref 6–8.3)
RBC # BLD: 4.94 M/UL — SIGNIFICANT CHANGE UP (ref 3.8–5.2)
RBC # FLD: 14 % — SIGNIFICANT CHANGE UP (ref 10.3–14.5)
SODIUM SERPL-SCNC: 142 MMOL/L — SIGNIFICANT CHANGE UP (ref 135–145)
T4 FREE SERPL-MCNC: 1.2 NG/DL — SIGNIFICANT CHANGE UP (ref 0.9–1.8)
TSH SERPL-MCNC: 3.83 UIU/ML — SIGNIFICANT CHANGE UP (ref 0.27–4.2)
WBC # BLD: 9.93 K/UL — SIGNIFICANT CHANGE UP (ref 3.8–10.5)
WBC # FLD AUTO: 9.93 K/UL — SIGNIFICANT CHANGE UP (ref 3.8–10.5)

## 2018-08-17 DIAGNOSIS — K52.9 NONINFECTIVE GASTROENTERITIS AND COLITIS, UNSPECIFIED: ICD-10-CM

## 2018-08-17 DIAGNOSIS — F34.1 DYSTHYMIC DISORDER: ICD-10-CM

## 2018-10-08 ENCOUNTER — OUTPATIENT (OUTPATIENT)
Dept: OUTPATIENT SERVICES | Facility: HOSPITAL | Age: 78
LOS: 1 days | End: 2018-10-08
Payer: MEDICARE

## 2018-10-08 VITALS
RESPIRATION RATE: 14 BRPM | WEIGHT: 175.93 LBS | HEIGHT: 61 IN | OXYGEN SATURATION: 98 % | HEART RATE: 88 BPM | TEMPERATURE: 98 F | DIASTOLIC BLOOD PRESSURE: 80 MMHG | SYSTOLIC BLOOD PRESSURE: 130 MMHG

## 2018-10-08 DIAGNOSIS — E03.9 HYPOTHYROIDISM, UNSPECIFIED: ICD-10-CM

## 2018-10-08 DIAGNOSIS — G47.30 SLEEP APNEA, UNSPECIFIED: ICD-10-CM

## 2018-10-08 DIAGNOSIS — F41.9 ANXIETY DISORDER, UNSPECIFIED: ICD-10-CM

## 2018-10-08 DIAGNOSIS — E11.9 TYPE 2 DIABETES MELLITUS WITHOUT COMPLICATIONS: ICD-10-CM

## 2018-10-08 DIAGNOSIS — K52.9 NONINFECTIVE GASTROENTERITIS AND COLITIS, UNSPECIFIED: ICD-10-CM

## 2018-10-08 DIAGNOSIS — Z90.711 ACQUIRED ABSENCE OF UTERUS WITH REMAINING CERVICAL STUMP: Chronic | ICD-10-CM

## 2018-10-08 DIAGNOSIS — Z86.69 PERSONAL HISTORY OF OTHER DISEASES OF THE NERVOUS SYSTEM AND SENSE ORGANS: Chronic | ICD-10-CM

## 2018-10-08 DIAGNOSIS — I10 ESSENTIAL (PRIMARY) HYPERTENSION: ICD-10-CM

## 2018-10-08 LAB
BUN SERPL-MCNC: 13 MG/DL — SIGNIFICANT CHANGE UP (ref 7–23)
CALCIUM SERPL-MCNC: 9.9 MG/DL — SIGNIFICANT CHANGE UP (ref 8.4–10.5)
CHLORIDE SERPL-SCNC: 101 MMOL/L — SIGNIFICANT CHANGE UP (ref 98–107)
CO2 SERPL-SCNC: 26 MMOL/L — SIGNIFICANT CHANGE UP (ref 22–31)
CREAT SERPL-MCNC: 0.95 MG/DL — SIGNIFICANT CHANGE UP (ref 0.5–1.3)
GLUCOSE SERPL-MCNC: 154 MG/DL — HIGH (ref 70–99)
HBA1C BLD-MCNC: 7.9 % — HIGH (ref 4–5.6)
HCT VFR BLD CALC: 41.8 % — SIGNIFICANT CHANGE UP (ref 34.5–45)
HGB BLD-MCNC: 12.9 G/DL — SIGNIFICANT CHANGE UP (ref 11.5–15.5)
MCHC RBC-ENTMCNC: 26.4 PG — LOW (ref 27–34)
MCHC RBC-ENTMCNC: 30.9 % — LOW (ref 32–36)
MCV RBC AUTO: 85.5 FL — SIGNIFICANT CHANGE UP (ref 80–100)
NRBC # FLD: 0 — SIGNIFICANT CHANGE UP
PLATELET # BLD AUTO: 293 K/UL — SIGNIFICANT CHANGE UP (ref 150–400)
PMV BLD: 10.5 FL — SIGNIFICANT CHANGE UP (ref 7–13)
POTASSIUM SERPL-MCNC: 3.7 MMOL/L — SIGNIFICANT CHANGE UP (ref 3.5–5.3)
POTASSIUM SERPL-SCNC: 3.7 MMOL/L — SIGNIFICANT CHANGE UP (ref 3.5–5.3)
RBC # BLD: 4.89 M/UL — SIGNIFICANT CHANGE UP (ref 3.8–5.2)
RBC # FLD: 13.9 % — SIGNIFICANT CHANGE UP (ref 10.3–14.5)
SODIUM SERPL-SCNC: 141 MMOL/L — SIGNIFICANT CHANGE UP (ref 135–145)
WBC # BLD: 9.88 K/UL — SIGNIFICANT CHANGE UP (ref 3.8–10.5)
WBC # FLD AUTO: 9.88 K/UL — SIGNIFICANT CHANGE UP (ref 3.8–10.5)

## 2018-10-08 PROCEDURE — 93010 ELECTROCARDIOGRAM REPORT: CPT

## 2018-10-08 NOTE — H&P PST ADULT - NEGATIVE ENMT SYMPTOMS
no tinnitus/no hearing difficulty/no nasal obstruction/no abnormal taste sensation/no post-nasal discharge/no nasal congestion/no recurrent cold sores/no dry mouth/no gum bleeding/no throat pain no tinnitus/no recurrent cold sores/no throat pain/no post-nasal discharge/no abnormal taste sensation/no dry mouth/no nasal congestion/no nasal obstruction/no gum bleeding

## 2018-10-08 NOTE — H&P PST ADULT - GASTROINTESTINAL COMMENTS
for the past year Preop dx Encounter for screening for malignant neoplasm of colon Pre op diagnosis: Noninfective gastroenteritis and colitis

## 2018-10-08 NOTE — H&P PST ADULT - NEGATIVE NEUROLOGICAL SYMPTOMS
no transient paralysis/no paresthesias/no weakness/no generalized seizures/no focal seizures/no difficulty walking no paresthesias/no loss of sensation/no difficulty walking/no facial palsy/no transient paralysis/no weakness/no generalized seizures/no focal seizures

## 2018-10-08 NOTE — H&P PST ADULT - DENTITION
patient with loose teeth top front 5 teeth patient with loose teeth top front 5 teeth, missing teeth

## 2018-10-08 NOTE — H&P PST ADULT - NEGATIVE LYMPHATIC SYMPTOMS
no enlarged lymph nodes/no swelling of extremity no swelling of extremity/no tender lymph nodes/no enlarged lymph nodes

## 2018-10-08 NOTE — H&P PST ADULT - NEGATIVE ALLERGY TYPES
no outdoor environmental allergies/no indoor environmental allergies/no reactions to animals/no reactions to medicines/no reactions to food no reactions to insect bites/no outdoor environmental allergies/no indoor environmental allergies/no reactions to medicines/no reactions to animals/no reactions to food

## 2018-10-08 NOTE — H&P PST ADULT - HISTORY OF PRESENT ILLNESS
77 year  old female with a history of diarrhaea for the past year. Patient was referred to Dr. Toney for an evaluation. Patient pre op diagnosis is noninfective gastroenteritis and colitis. Patient is scheduled for Colonoscopy - Anesthesia scheduled on 10/17/2018.

## 2018-10-08 NOTE — H&P PST ADULT - NEGATIVE BREAST SYMPTOMS
no breast lump R/no breast tenderness L/no nipple discharge R/no breast tenderness R/no breast lump L/no nipple discharge L

## 2018-10-08 NOTE — H&P PST ADULT - NEGATIVE OPHTHALMOLOGIC SYMPTOMS
no blurred vision L/no blurred vision R/no pain L/no irritation L/no diplopia/no photophobia/no discharge R/no irritation R/no pain R/no lacrimation L/no lacrimation R/no discharge L

## 2018-10-08 NOTE — H&P PST ADULT - ENDOCRINE COMMENTS
Hypothyroidism History of Hypothyroidism, History of Diabetes - Patient reports Fasting FS-between 250-300.

## 2018-10-08 NOTE — H&P PST ADULT - OTHER CARE PROVIDERS
Dr Caro Diehl (Cardiologist) [phone], Dr. Malachi Herrera-[phone] Dr Caro Diehl (Cardiologist) [phone], Dr. Malachi Herrera-[phone]-[facility]

## 2018-10-08 NOTE — H&P PST ADULT - RS GEN PE MLT RESP DETAILS PC
airway patent/good air movement/respirations non-labored/breath sounds equal/no rhonchi/no rales/no wheezes airway patent/breath sounds equal/respirations non-labored/good air movement/clear to auscultation bilaterally/no rhonchi/no wheezes/no rales

## 2018-10-08 NOTE — H&P PST ADULT - NEUROLOGICAL DETAILS
responds to pain/sensation intact/responds to verbal commands/alert and oriented x 3 sensation intact/responds to pain/responds to verbal commands/normal strength/alert and oriented x 3

## 2018-10-08 NOTE — H&P PST ADULT - PROBLEM SELECTOR PLAN 1
Patient is scheduled for Colonoscopy - anesthesia scheduled on 10/17/2018.  Preop instructions, pepcid, provided. Pt stated understanding.    Pending medical evaluation, patient reports Fasting FS from 250-300. Dr.Ronald Diehl [phone].Pending Labs.    Pending Dental evaluation -5 loose teeth- Dr. Malachi Herrera-[phone]-Dentist.  Pending cardiology evaluation -patient reports palpitations and Dyspnea on -exertion. Dr. Caro Diehl [phone].

## 2018-10-08 NOTE — H&P PST ADULT - PMH
Anxiety    Depression    DM (diabetes mellitus)    HTN (hypertension)    Hypothyroidism    IBS (irritable bowel syndrome)    Noninfective gastroenteritis and colitis    Panic disorder    Sleep apnea  Patient reports she does not use CPAP

## 2018-10-08 NOTE — H&P PST ADULT - ASSESSMENT
Encounter for screening for malignant neoplasm of colon Noninfective gastroenteritis and colitis - patient is scheduled for Colonoscopy - anesthesia scheduled on 10/17/2018.

## 2018-10-08 NOTE — H&P PST ADULT - GASTROINTESTINAL DETAILS
nontender/bowel sounds normal/soft nontender/soft/no masses palpable/bowel sounds normal/no rebound tenderness/no guarding

## 2018-10-10 ENCOUNTER — OUTPATIENT (OUTPATIENT)
Dept: OUTPATIENT SERVICES | Facility: HOSPITAL | Age: 78
LOS: 1 days | Discharge: HOME | End: 2018-10-10
Payer: MEDICARE

## 2018-10-10 DIAGNOSIS — Z86.69 PERSONAL HISTORY OF OTHER DISEASES OF THE NERVOUS SYSTEM AND SENSE ORGANS: Chronic | ICD-10-CM

## 2018-10-10 DIAGNOSIS — Z90.711 ACQUIRED ABSENCE OF UTERUS WITH REMAINING CERVICAL STUMP: Chronic | ICD-10-CM

## 2018-10-10 PROBLEM — K52.9 NONINFECTIVE GASTROENTERITIS AND COLITIS, UNSPECIFIED: Chronic | Status: ACTIVE | Noted: 2018-10-08

## 2018-10-11 DIAGNOSIS — F33.9 MAJOR DEPRESSIVE DISORDER, RECURRENT, UNSPECIFIED: ICD-10-CM

## 2018-10-11 DIAGNOSIS — F41.9 ANXIETY DISORDER, UNSPECIFIED: ICD-10-CM

## 2018-10-25 ENCOUNTER — OUTPATIENT (OUTPATIENT)
Dept: OUTPATIENT SERVICES | Facility: HOSPITAL | Age: 78
LOS: 1 days | End: 2018-10-25

## 2018-10-25 ENCOUNTER — APPOINTMENT (OUTPATIENT)
Dept: INTERNAL MEDICINE | Facility: HOSPITAL | Age: 78
End: 2018-10-25
Payer: MEDICARE

## 2018-10-25 VITALS — HEART RATE: 88 BPM | DIASTOLIC BLOOD PRESSURE: 67 MMHG | SYSTOLIC BLOOD PRESSURE: 137 MMHG

## 2018-10-25 VITALS — WEIGHT: 178.19 LBS | BODY MASS INDEX: 33.64 KG/M2 | HEIGHT: 61 IN

## 2018-10-25 DIAGNOSIS — R42 DIZZINESS AND GIDDINESS: ICD-10-CM

## 2018-10-25 DIAGNOSIS — Z90.711 ACQUIRED ABSENCE OF UTERUS WITH REMAINING CERVICAL STUMP: Chronic | ICD-10-CM

## 2018-10-25 DIAGNOSIS — Z86.69 PERSONAL HISTORY OF OTHER DISEASES OF THE NERVOUS SYSTEM AND SENSE ORGANS: Chronic | ICD-10-CM

## 2018-10-25 PROCEDURE — 99214 OFFICE O/P EST MOD 30 MIN: CPT | Mod: GC

## 2018-10-25 RX ORDER — VENLAFAXINE HYDROCHLORIDE 37.5 MG/1
37.5 CAPSULE, EXTENDED RELEASE ORAL
Qty: 90 | Refills: 0 | Status: DISCONTINUED | COMMUNITY
Start: 2017-01-26 | End: 2018-10-25

## 2018-10-26 DIAGNOSIS — Z23 ENCOUNTER FOR IMMUNIZATION: ICD-10-CM

## 2018-10-26 LAB — GLUCOSE BLDC GLUCOMTR-MCNC: 205

## 2018-10-27 PROBLEM — R42 VERTIGO: Status: ACTIVE | Noted: 2018-02-14

## 2018-10-27 RX ORDER — ALENDRONATE SODIUM 35 MG/1
35 TABLET ORAL
Qty: 12 | Refills: 0 | Status: DISCONTINUED | COMMUNITY
Start: 2018-04-23 | End: 2018-10-27

## 2018-10-29 DIAGNOSIS — E11.40 TYPE 2 DIABETES MELLITUS WITH DIABETIC NEUROPATHY, UNSPECIFIED: ICD-10-CM

## 2018-10-29 DIAGNOSIS — F41.9 ANXIETY DISORDER, UNSPECIFIED: ICD-10-CM

## 2018-10-29 DIAGNOSIS — M85.80 OTHER SPECIFIED DISORDERS OF BONE DENSITY AND STRUCTURE, UNSPECIFIED SITE: ICD-10-CM

## 2018-10-29 DIAGNOSIS — K52.9 NONINFECTIVE GASTROENTERITIS AND COLITIS, UNSPECIFIED: ICD-10-CM

## 2018-10-29 DIAGNOSIS — R42 DIZZINESS AND GIDDINESS: ICD-10-CM

## 2018-10-29 DIAGNOSIS — I10 ESSENTIAL (PRIMARY) HYPERTENSION: ICD-10-CM

## 2018-10-30 NOTE — HISTORY OF PRESENT ILLNESS
[FreeTextEntry1] : Pre-op clearance for endoscopy [de-identified] : 77F with DM2 (2/14/18 A1c 7.1%), hypothyroidism, depression with anxiety, R LBP, chronic diarrhea (IBS-D?), osteopenia, vitamin D deficiency who presents for routine follow up. Last visit 4/23/18.\par \par CHRONIC DIARRHEA\par Per chart review, patient for the past three years has complained intermittently of loose stools, and at times, watery stool. She endorses constipation for the past 2-3 days, however the rest of week she has had constant diarrhea occurring "every hour".  She describes the diarrhea as watery without mucous or blood and does not note it floating on the surface of the bowel.  She has been taking Imodium with some improvement. The diarrhea is not related to food.  Denies F/C.  She follows with GI who are concerned for collagenous colitis vs microscopic colitis with plan for colonoscopy with random biopsies. \par \par DIZZINESS\par Patient endorses lightheadedness accompanied by floaters. She reports tripping over herself at times but no disequilibrium. Endorses falling twice where her legs collapsed and she fell without sustaining bruises, injuries, or head trauma. No LOC. No noted exacerbating factors of lightheadedness, but patient reports that symptoms improve after she sits and drinks some water. She endorses vertigo while lying flat in bead without moving her head but denies vertigo while upright and walking. She has a history of DM2 for which she is on metformin but has not checked her FS after a lightheaded episode. Moreover, patient chronically has very poor PO.\par \par ELAINA\par Patient reports h/o ELAINA but was not able to tolerate CPAP..\par \par OSTEOPENIA\par DEXA with osteopenia and FRAX risk of 10yr fracture at hip of 3.4%.  Was supposed to start bisphosphonates however pt does not believe she has taken this medication.

## 2018-10-30 NOTE — ASSESSMENT
[FreeTextEntry1] : 77F with DM2 (2/14/18 A1c 7.1%), hypothyroidism, depression with anxiety, R LBP, chronic diarrhea (IBS-D?), osteopenia, vitamin D deficiency who presents for routine follow up and clearance for colonoscopy.  Last visit 4/23/18.\par \par # Chronic diarrhea: increased frequency since last visit. GI following with concern for collagenous colitis vs microscopic colitis with plan for colonoscopy with random biopsies. \par - f/u results of colonoscopy\par - Pt with RCRI score 0.  Pt is low risk for low risk procedure.  \par \par # Osteopenia with vit D deficiency\par - c/w Ca and vit D supplementation\par - repeat DEXA in 2 years (3/2020)\par \par # Dizziness/vertigo: improved since last visit despite not doing exercises but still intermittently symptomatic\par - encouraged patient to do exercises for BPPV provided last visit\par \par # mild gait instability: patient without adequate exercise and likely with deconditioning\par - encouraged patient to walk for 20min daily and encouraged son to join her\par \par # DM2: 10/8/18 A1c 7.9% with Cr:alb WNL \par - c/w metformin 1g BID, losartan 25, and atorvastatin 40\par - encouraged patient to exercise and check FS regularly\par \par #. HTN\par - well controlled on losartan 25\par \par #. HCM\par - vaccinations: UTD, influenza today\par - mammo: UTD with 3/2018 BiRADs 1\par - colonoscopy: 8/2016 WNL\par - DM2 evals: overdue on ophtho (2016), provided referral \par - DEXA: UTD with 3/2018 osteopenia; recommended repeat in 2 years \par \par Patient d/w Dr. Diehl.\par \par Mark Hellerman, MD PGY1 \par Internal Medicine \par [facility] \par [phone]\par \par \par

## 2018-10-30 NOTE — PHYSICAL EXAM
[No Acute Distress] : no acute distress [Well Nourished] : well nourished [Well Developed] : well developed [Normal Sclera/Conjunctiva] : normal sclera/conjunctiva [PERRL] : pupils equal round and reactive to light [EOMI] : extraocular movements intact [Normal Outer Ear/Nose] : the outer ears and nose were normal in appearance [Normal Oropharynx] : the oropharynx was normal [No JVD] : no jugular venous distention [No Respiratory Distress] : no respiratory distress  [Normal Rate] : normal rate  [Regular Rhythm] : with a regular rhythm [Normal S1, S2] : normal S1 and S2 [No Murmur] : no murmur heard [No Carotid Bruits] : no carotid bruits [Coordination Grossly Intact] : coordination grossly intact [de-identified] : Negative romberg, no horizontal or vertical nystagmus, proprioception intact [de-identified] : Depressed mood

## 2018-10-30 NOTE — REVIEW OF SYSTEMS
[Vision Problems] : vision problems [Diarrhea] : diarrhea [Dizziness] : dizziness [Depression] : depression [Fever] : no fever [Chills] : no chills [Night Sweats] : no night sweats [Discharge] : no discharge [Pain] : no pain [Earache] : no earache [Hearing Loss] : no hearing loss [Nasal Discharge] : no nasal discharge [Chest Pain] : no chest pain [Palpitations] : no palpitations [Orthopnea] : no orthopnea [Shortness Of Breath] : no shortness of breath [Wheezing] : no wheezing [Cough] : no cough [Abdominal Pain] : no abdominal pain [Vomiting] : no vomiting [Dysuria] : no dysuria [Incontinence] : no incontinence [Itching] : no itching [Skin Rash] : no skin rash [Headache] : no headache [Memory Loss] : no memory loss [Suicidal] : not suicidal [FreeTextEntry3] : Vision getting blurrier  [FreeTextEntry7] : See HPI for diarrhea

## 2018-11-02 ENCOUNTER — NON-APPOINTMENT (OUTPATIENT)
Age: 78
End: 2018-11-02

## 2018-11-02 ENCOUNTER — APPOINTMENT (OUTPATIENT)
Dept: CARDIOLOGY | Facility: CLINIC | Age: 78
End: 2018-11-02
Payer: MEDICARE

## 2018-11-02 VITALS
BODY MASS INDEX: 32.85 KG/M2 | HEIGHT: 61 IN | RESPIRATION RATE: 20 BRPM | SYSTOLIC BLOOD PRESSURE: 133 MMHG | HEART RATE: 96 BPM | WEIGHT: 174 LBS | TEMPERATURE: 98 F | DIASTOLIC BLOOD PRESSURE: 71 MMHG | OXYGEN SATURATION: 98 %

## 2018-11-02 DIAGNOSIS — G47.33 OBSTRUCTIVE SLEEP APNEA (ADULT) (PEDIATRIC): ICD-10-CM

## 2018-11-02 DIAGNOSIS — R00.2 PALPITATIONS: ICD-10-CM

## 2018-11-02 PROCEDURE — 93000 ELECTROCARDIOGRAM COMPLETE: CPT

## 2018-11-02 PROCEDURE — 99214 OFFICE O/P EST MOD 30 MIN: CPT

## 2018-11-02 NOTE — DISCUSSION/SUMMARY
[FreeTextEntry1] : 66 y/o female seen By Dr. Diehl in 2016, comes for pre-op clearance. Patient C/O CP, palpitations, and recent SOB at rest. H/O HTN, DM and Panic disorder. BP well controlled. EKG shows PRWP. Schedule for echo and PST. Follow up with test results in one month.

## 2018-11-02 NOTE — REVIEW OF SYSTEMS
[Feeling Fatigued] : feeling fatigued [Shortness Of Breath] : shortness of breath [Dyspnea on exertion] : dyspnea during exertion [Chest Pain] : chest pain [Palpitations] : palpitations [Fever] : no fever [Headache] : no headache [Chills] : no chills [Blurry Vision] : no blurred vision [Eyeglasses] : not currently wearing eyeglasses [Earache] : no earache [Mouth Sores] : no mouth sores [Lower Ext Edema] : no extremity edema [Cough] : no cough [Abdominal Pain] : no abdominal pain [Heartburn] : no heartburn [Dysphagia] : no dysphagia [Dysuria] : no dysuria [Incontinence] : no incontinence [Joint Pain] : no joint pain [Muscle Cramps] : no muscle cramps [Skin: A Rash] : no rash: [Skin Lesions] : no skin lesions [Dizziness] : no dizziness [Convulsions] : no convulsions [Confusion] : no confusion was observed [Anxiety] : no anxiety [Excessive Thirst] : no polydipsia [Easy Bleeding] : no tendency for easy bleeding [Easy Bruising] : no tendency for easy bruising

## 2018-11-02 NOTE — HISTORY OF PRESENT ILLNESS
[FreeTextEntry1] : 66 y/o female seen By Dr. Diehl in 2016, comes for pre-op clearance. Patient C/O CP, palpitations, and recent SOB at rest. H/O HTN, DM and Panic disorder.

## 2018-11-02 NOTE — PHYSICAL EXAM
[General Appearance - Well Developed] : well developed [Normal Appearance] : normal appearance [General Appearance - Well Nourished] : well nourished [No Deformities] : no deformities [Normal Conjunctiva] : the conjunctiva exhibited no abnormalities [Normal Oral Mucosa] : normal oral mucosa [Normal Oropharynx] : normal oropharynx [JVD Elevated _____cm] : JVD elevated [unfilled] ~U cm above clavicle [Respiration, Rhythm And Depth] : normal respiratory rhythm and effort [Exaggerated Use Of Accessory Muscles For Inspiration] : no accessory muscle use [Auscultation Breath Sounds / Voice Sounds] : lungs were clear to auscultation bilaterally [Normal] : normal [No Precordial Heave] : no precordial heave was noted [Normal Rate] : normal [Rhythm Regular] : regular [Normal S1] : normal S1 [Normal S2] : normal S2 [II] : a grade 2 [2+] : left 2+ [No Pitting Edema] : no pitting edema present [Bowel Sounds] : normal bowel sounds [Abdomen Soft] : soft [Abdomen Tenderness] : non-tender [Abnormal Walk] : normal gait [Nail Clubbing] : no clubbing of the fingernails [Cyanosis, Localized] : no localized cyanosis [Petechial Hemorrhages (___cm)] : no petechial hemorrhages [Skin Color & Pigmentation] : normal skin color and pigmentation [Skin Turgor] : normal skin turgor [] : no rash [No Venous Stasis] : no venous stasis [Oriented To Time, Place, And Person] : oriented to person, place, and time [Impaired Insight] : insight and judgment were intact [Affect] : the affect was normal [No Anxiety] : not feeling anxious

## 2018-12-06 ENCOUNTER — APPOINTMENT (OUTPATIENT)
Dept: GASTROENTEROLOGY | Facility: HOSPITAL | Age: 78
End: 2018-12-06

## 2018-12-07 ENCOUNTER — APPOINTMENT (OUTPATIENT)
Dept: INTERNAL MEDICINE | Facility: HOSPITAL | Age: 78
End: 2018-12-07

## 2018-12-17 ENCOUNTER — APPOINTMENT (OUTPATIENT)
Dept: CV DIAGNOSTICS | Facility: HOSPITAL | Age: 78
End: 2018-12-17

## 2018-12-17 ENCOUNTER — APPOINTMENT (OUTPATIENT)
Dept: CV DIAGNOSITCS | Facility: HOSPITAL | Age: 78
End: 2018-12-17

## 2019-01-02 ENCOUNTER — OUTPATIENT (OUTPATIENT)
Dept: OUTPATIENT SERVICES | Facility: HOSPITAL | Age: 79
LOS: 1 days | End: 2019-01-02

## 2019-01-02 VITALS
SYSTOLIC BLOOD PRESSURE: 110 MMHG | TEMPERATURE: 99 F | HEIGHT: 62 IN | DIASTOLIC BLOOD PRESSURE: 72 MMHG | HEART RATE: 88 BPM | RESPIRATION RATE: 14 BRPM | WEIGHT: 177.91 LBS

## 2019-01-02 DIAGNOSIS — F41.9 ANXIETY DISORDER, UNSPECIFIED: ICD-10-CM

## 2019-01-02 DIAGNOSIS — K52.9 NONINFECTIVE GASTROENTERITIS AND COLITIS, UNSPECIFIED: ICD-10-CM

## 2019-01-02 DIAGNOSIS — E11.9 TYPE 2 DIABETES MELLITUS WITHOUT COMPLICATIONS: ICD-10-CM

## 2019-01-02 DIAGNOSIS — Z90.711 ACQUIRED ABSENCE OF UTERUS WITH REMAINING CERVICAL STUMP: Chronic | ICD-10-CM

## 2019-01-02 DIAGNOSIS — Z86.69 PERSONAL HISTORY OF OTHER DISEASES OF THE NERVOUS SYSTEM AND SENSE ORGANS: Chronic | ICD-10-CM

## 2019-01-02 DIAGNOSIS — I10 ESSENTIAL (PRIMARY) HYPERTENSION: ICD-10-CM

## 2019-01-02 DIAGNOSIS — G47.30 SLEEP APNEA, UNSPECIFIED: ICD-10-CM

## 2019-01-02 LAB
BUN SERPL-MCNC: 18 MG/DL — SIGNIFICANT CHANGE UP (ref 7–23)
CALCIUM SERPL-MCNC: 10.2 MG/DL — SIGNIFICANT CHANGE UP (ref 8.4–10.5)
CHLORIDE SERPL-SCNC: 102 MMOL/L — SIGNIFICANT CHANGE UP (ref 98–107)
CO2 SERPL-SCNC: 27 MMOL/L — SIGNIFICANT CHANGE UP (ref 22–31)
CREAT SERPL-MCNC: 0.87 MG/DL — SIGNIFICANT CHANGE UP (ref 0.5–1.3)
GLUCOSE SERPL-MCNC: 176 MG/DL — HIGH (ref 70–99)
HBA1C BLD-MCNC: 8.2 % — HIGH (ref 4–5.6)
HCT VFR BLD CALC: 41.1 % — SIGNIFICANT CHANGE UP (ref 34.5–45)
HGB BLD-MCNC: 12.6 G/DL — SIGNIFICANT CHANGE UP (ref 11.5–15.5)
MCHC RBC-ENTMCNC: 26.4 PG — LOW (ref 27–34)
MCHC RBC-ENTMCNC: 30.7 % — LOW (ref 32–36)
MCV RBC AUTO: 86 FL — SIGNIFICANT CHANGE UP (ref 80–100)
NRBC # FLD: 0 — SIGNIFICANT CHANGE UP
PLATELET # BLD AUTO: 305 K/UL — SIGNIFICANT CHANGE UP (ref 150–400)
PMV BLD: 10.3 FL — SIGNIFICANT CHANGE UP (ref 7–13)
POTASSIUM SERPL-MCNC: 3.9 MMOL/L — SIGNIFICANT CHANGE UP (ref 3.5–5.3)
POTASSIUM SERPL-SCNC: 3.9 MMOL/L — SIGNIFICANT CHANGE UP (ref 3.5–5.3)
RBC # BLD: 4.78 M/UL — SIGNIFICANT CHANGE UP (ref 3.8–5.2)
RBC # FLD: 13.7 % — SIGNIFICANT CHANGE UP (ref 10.3–14.5)
SODIUM SERPL-SCNC: 142 MMOL/L — SIGNIFICANT CHANGE UP (ref 135–145)
WBC # BLD: 10.92 K/UL — HIGH (ref 3.8–10.5)
WBC # FLD AUTO: 10.92 K/UL — HIGH (ref 3.8–10.5)

## 2019-01-02 RX ORDER — LOSARTAN POTASSIUM 100 MG/1
1 TABLET, FILM COATED ORAL
Qty: 0 | Refills: 0 | COMMUNITY

## 2019-01-02 RX ORDER — CHOLECALCIFEROL (VITAMIN D3) 125 MCG
1 CAPSULE ORAL
Qty: 0 | Refills: 0 | COMMUNITY

## 2019-01-02 RX ORDER — GABAPENTIN 400 MG/1
1 CAPSULE ORAL
Qty: 0 | Refills: 0 | COMMUNITY

## 2019-01-02 RX ORDER — METFORMIN HYDROCHLORIDE 850 MG/1
1 TABLET ORAL
Qty: 0 | Refills: 0 | COMMUNITY

## 2019-01-02 RX ORDER — ASPIRIN/CALCIUM CARB/MAGNESIUM 324 MG
1 TABLET ORAL
Qty: 0 | Refills: 0 | COMMUNITY

## 2019-01-02 RX ORDER — ATORVASTATIN CALCIUM 80 MG/1
1 TABLET, FILM COATED ORAL
Qty: 0 | Refills: 0 | COMMUNITY

## 2019-01-02 NOTE — H&P PST ADULT - DENTITION
Denies loose teeth, had dental extraction x5 in Oct 2018-Dec 2018, full upper dentures, lower partial dentures

## 2019-01-02 NOTE — H&P PST ADULT - FAMILY HISTORY
Father  Still living? No  Family history of diabetes mellitus, Age at diagnosis: Age Unknown     Mother  Still living? No  Family history of heart disease, Age at diagnosis: Age Unknown     Child  Still living? Yes, Estimated age: 41-50  Family history of diabetes mellitus, Age at diagnosis: Age Unknown

## 2019-01-02 NOTE — H&P PST ADULT - NEGATIVE BREAST SYMPTOMS
no nipple discharge R/no breast lump L/no nipple discharge L/no breast tenderness L/no breast tenderness R/no breast lump R

## 2019-01-02 NOTE — H&P PST ADULT - NEGATIVE OPHTHALMOLOGIC SYMPTOMS
no lacrimation L/no irritation L/no photophobia/no discharge L/no pain R/no irritation R/no lacrimation R/no diplopia/no discharge R/no pain L

## 2019-01-02 NOTE — H&P PST ADULT - NEGATIVE NEUROLOGICAL SYMPTOMS
no headache/no facial palsy/no weakness/no paresthesias/no generalized seizures/no difficulty walking/no transient paralysis/no focal seizures/no loss of sensation

## 2019-01-02 NOTE — H&P PST ADULT - MUSCULOSKELETAL
details… detailed exam normal strength/ROM intact/no joint swelling/no joint warmth/no calf tenderness/no joint erythema

## 2019-01-02 NOTE — H&P PST ADULT - NEUROLOGICAL DETAILS
alert and oriented x 3/sensation intact/normal strength/responds to pain/responds to verbal commands

## 2019-01-02 NOTE — H&P PST ADULT - PMH
Anxiety    Depression    DM (diabetes mellitus)  type 2  HTN (hypertension)    Hypothyroidism    IBS (irritable bowel syndrome)    Noninfective gastroenteritis and colitis    Panic disorder    Sleep apnea  Patient reports she does not use CPAP

## 2019-01-02 NOTE — H&P PST ADULT - HISTORY OF PRESENT ILLNESS
77 yo female with PMH noninfective gastroenteritis and colitis presents to pre surgical testing. Pt reports she has been having intermittent loose stools for 2 years, taking loperamide as needed with limited relief.  Pt reports symptoms are persistent.  Pt is scheduled for colonoscopy - anesthesia on 1/15/19.

## 2019-01-02 NOTE — H&P PST ADULT - NEGATIVE GENERAL GENITOURINARY SYMPTOMS
no flank pain R/no hematuria/no flank pain L/normal urinary frequency/no dysuria/no bladder infections/no incontinence/no urinary hesitancy

## 2019-01-02 NOTE — H&P PST ADULT - OTHER CARE PROVIDERS
[facility] [phone] [facility] [phone]                                 Dr. Malachi Herrera-[phone]-Dentist

## 2019-01-02 NOTE — H&P PST ADULT - GASTROINTESTINAL DETAILS
no masses palpable/no guarding/nontender/soft/no rebound tenderness/bowel sounds normal soft/nontender/bowel sounds normal/no masses palpable

## 2019-01-02 NOTE — H&P PST ADULT - PROBLEM SELECTOR PLAN 1
Pt is scheduled for colonoscopy - anesthesia on 1/15/19.   Pre op instructions including chlorhexidine wash given and pt able to verbalize understanding.  Pt to obtain medical eval and cardiac eval as requested by surgeon, will request document.

## 2019-01-02 NOTE — H&P PST ADULT - NEGATIVE ENMT SYMPTOMS
no tinnitus/no nasal congestion/no hearing difficulty/no recurrent cold sores/no throat pain/no post-nasal discharge/no gum bleeding/no dry mouth/no nasal obstruction/no abnormal taste sensation

## 2019-01-02 NOTE — H&P PST ADULT - NEGATIVE ALLERGY TYPES
no reactions to insect bites/no outdoor environmental allergies/no indoor environmental allergies/no reactions to medicines/no reactions to animals/no reactions to food

## 2019-01-02 NOTE — H&P PST ADULT - PRIMARY CARE PROVIDER
Dr. Jose Maria Diehl([facility])[phone] Dr. Jose Maria Diehl(PCP)[phone]                           Dr. Kenya Burrell(Cardiology) [phone]

## 2019-01-04 ENCOUNTER — APPOINTMENT (OUTPATIENT)
Dept: INTERNAL MEDICINE | Facility: HOSPITAL | Age: 79
End: 2019-01-04

## 2019-01-10 ENCOUNTER — LABORATORY RESULT (OUTPATIENT)
Age: 79
End: 2019-01-10

## 2019-01-10 ENCOUNTER — APPOINTMENT (OUTPATIENT)
Dept: INTERNAL MEDICINE | Facility: HOSPITAL | Age: 79
End: 2019-01-10
Payer: MEDICARE

## 2019-01-10 ENCOUNTER — OUTPATIENT (OUTPATIENT)
Dept: OUTPATIENT SERVICES | Facility: HOSPITAL | Age: 79
LOS: 1 days | End: 2019-01-10

## 2019-01-10 VITALS — WEIGHT: 173 LBS | HEIGHT: 61 IN | BODY MASS INDEX: 32.66 KG/M2

## 2019-01-10 VITALS — DIASTOLIC BLOOD PRESSURE: 63 MMHG | HEART RATE: 78 BPM | SYSTOLIC BLOOD PRESSURE: 109 MMHG

## 2019-01-10 DIAGNOSIS — K52.9 NONINFECTIVE GASTROENTERITIS AND COLITIS, UNSPECIFIED: ICD-10-CM

## 2019-01-10 DIAGNOSIS — Z86.69 PERSONAL HISTORY OF OTHER DISEASES OF THE NERVOUS SYSTEM AND SENSE ORGANS: Chronic | ICD-10-CM

## 2019-01-10 DIAGNOSIS — E03.9 HYPOTHYROIDISM, UNSPECIFIED: ICD-10-CM

## 2019-01-10 DIAGNOSIS — Z90.711 ACQUIRED ABSENCE OF UTERUS WITH REMAINING CERVICAL STUMP: Chronic | ICD-10-CM

## 2019-01-10 DIAGNOSIS — M54.5 LOW BACK PAIN: ICD-10-CM

## 2019-01-10 DIAGNOSIS — I10 ESSENTIAL (PRIMARY) HYPERTENSION: ICD-10-CM

## 2019-01-10 LAB
GLUCOSE BLDC GLUCOMTR-MCNC: 151
HBA1C BLD-MCNC: 8 % — HIGH (ref 4–5.6)

## 2019-01-10 PROCEDURE — 99213 OFFICE O/P EST LOW 20 MIN: CPT | Mod: GE

## 2019-01-10 NOTE — PHYSICAL EXAM
[No Acute Distress] : no acute distress [EOMI] : extraocular movements intact [No JVD] : no jugular venous distention [Clear to Auscultation] : lungs were clear to auscultation bilaterally [Normal Rate] : normal rate  [Regular Rhythm] : with a regular rhythm [Normal S1, S2] : normal S1 and S2 [No Murmur] : no murmur heard [No Edema] : there was no peripheral edema [Soft] : abdomen soft [Non Tender] : non-tender [Normal Bowel Sounds] : normal bowel sounds [No Spinal Tenderness] : no spinal tenderness [No Focal Deficits] : no focal deficits [Alert and Oriented x3] : oriented to person, place, and time

## 2019-01-11 DIAGNOSIS — E11.40 TYPE 2 DIABETES MELLITUS WITH DIABETIC NEUROPATHY, UNSPECIFIED: ICD-10-CM

## 2019-01-11 PROBLEM — K52.9 CHRONIC DIARRHEA OF UNKNOWN ORIGIN: Status: ACTIVE | Noted: 2017-09-07

## 2019-01-27 NOTE — REVIEW OF SYSTEMS
[Dyspnea on Exertion] : dyspnea on exertion [Diarrhea] : diarrhea [Fever] : no fever [Vision Problems] : no vision problems [Chest Pain] : no chest pain [Palpitations] : no palpitations [Lower Ext Edema] : no lower extremity edema [Orthopnea] : no orthopnea [Cough] : no cough [Abdominal Pain] : no abdominal pain [Constipation] : no constipation [Vomiting] : no vomiting [Melena] : no melena [Dysuria] : no dysuria [Hematuria] : no hematuria [Headache] : no headache [Depression] : no depression [Swollen Glands] : no swollen glands

## 2019-01-27 NOTE — PLAN
[FreeTextEntry1] : 77F with DM2 (2/14/18 A1c 7.1%), hypothyroidism, depression with anxiety, R LBP, chronic diarrhea presents for pre-op clearance for colonoscopy\par \par \par # Pre-Op clearance\par - Plan for colonoscopy with random biopsies with GI\par - Pt is low risk for low risk procedure.s like colonoscopy - medically optimized \par \par # Chronic diarrhea:\par - Dx include microscopic colitis, IBS (less likely as patient does not have a history of IBS when she was younger), Metformin induced diarrhea. \par - She saw GI with plan for colonoscopy with random biopsies. \par - Counseled on diet and keeping food logs to help see if any diet precipitates her diarrhea\par - Will switch patient to Metformin ER due to its lower GI side effects\par \par # Osteopenia with vit D deficiency\par - c/w Ca and vit D supplementation\par - repeat DEXA in 2 years (3/2020)\par \par # mild gait instability: patient without adequate exercise and likely with deconditioning\par - Encouraged patient to continue walking for 20 minutes daily \par # DM2: 10/8/18 A1c 7.9% with Cr:alb WNL \par - Will switch metformin 1g BID to Metformin ER. Continue  losartan 25, and atorvastatin 40\par - Encouraged patient to exercise and check FS regularly\par \par #. HTN: Controlled\par - Continue Losartan 25\par \par #. HCM\par - vaccinations: UTD, \par - mammo: UTD. Next mammo due 03/19\par - Colonoscopy: 8/2016 WNL. Repeat scheduled with GI\par - DM2 evals: overdue on ophtho (2016), provided referral \par - DEXA: UTD with 3/2018 osteopenia; recommended repeat in 2 years \par \par Case discussed with Dr. Piña

## 2019-01-27 NOTE — END OF VISIT
[] : Resident [FreeTextEntry3] : Workup for ongoing diarrhea in progress and pt optimized for upcoming colonoscopy.  Would change Metformin to extended release form however as the update may diminish diarrhea sx.

## 2019-01-27 NOTE — HISTORY OF PRESENT ILLNESS
[Sleep Apnea] : sleep apnea [Diabetes] : diabetes [FreeTextEntry4] : 78F with DM2 ( last A1c 7.1%), hypothyroidism, depression with anxiety, R LBP, chronic diarrhea presenting for pre-op clearance. She has a history of chronic diarrhea over the past one year alternating with constipation. Diarrhea lasts for about a week and constipation for 3 days. Imodium helps with her diarrhea. She saw GI and was scheduled for colonoscopy on Feb 8. She saw cardiology for clearance and she is scheduled for stress test on January 22. Endorses dyspnea of exertion. Denies chest pain, fever/chills, n/v [FreeTextEntry7] : Stress test in 2016: Normal

## 2019-02-04 ENCOUNTER — OUTPATIENT (OUTPATIENT)
Dept: OUTPATIENT SERVICES | Facility: HOSPITAL | Age: 79
LOS: 1 days | End: 2019-02-04
Payer: MEDICARE

## 2019-02-04 VITALS
RESPIRATION RATE: 14 BRPM | SYSTOLIC BLOOD PRESSURE: 130 MMHG | DIASTOLIC BLOOD PRESSURE: 80 MMHG | HEART RATE: 96 BPM | TEMPERATURE: 96 F | OXYGEN SATURATION: 98 % | HEIGHT: 61 IN | WEIGHT: 175.93 LBS

## 2019-02-04 DIAGNOSIS — Z86.69 PERSONAL HISTORY OF OTHER DISEASES OF THE NERVOUS SYSTEM AND SENSE ORGANS: Chronic | ICD-10-CM

## 2019-02-04 DIAGNOSIS — Z90.711 ACQUIRED ABSENCE OF UTERUS WITH REMAINING CERVICAL STUMP: Chronic | ICD-10-CM

## 2019-02-04 DIAGNOSIS — K52.9 NONINFECTIVE GASTROENTERITIS AND COLITIS, UNSPECIFIED: ICD-10-CM

## 2019-02-04 LAB
ANION GAP SERPL CALC-SCNC: 18 MMO/L — HIGH (ref 7–14)
BUN SERPL-MCNC: 18 MG/DL — SIGNIFICANT CHANGE UP (ref 7–23)
CALCIUM SERPL-MCNC: 10.7 MG/DL — HIGH (ref 8.4–10.5)
CHLORIDE SERPL-SCNC: 100 MMOL/L — SIGNIFICANT CHANGE UP (ref 98–107)
CO2 SERPL-SCNC: 24 MMOL/L — SIGNIFICANT CHANGE UP (ref 22–31)
CREAT SERPL-MCNC: 0.94 MG/DL — SIGNIFICANT CHANGE UP (ref 0.5–1.3)
GLUCOSE SERPL-MCNC: 166 MG/DL — HIGH (ref 70–99)
HCT VFR BLD CALC: 43.1 % — SIGNIFICANT CHANGE UP (ref 34.5–45)
HGB BLD-MCNC: 13.5 G/DL — SIGNIFICANT CHANGE UP (ref 11.5–15.5)
MCHC RBC-ENTMCNC: 26.7 PG — LOW (ref 27–34)
MCHC RBC-ENTMCNC: 31.3 % — LOW (ref 32–36)
MCV RBC AUTO: 85.2 FL — SIGNIFICANT CHANGE UP (ref 80–100)
NRBC # FLD: 0 K/UL — LOW (ref 25–125)
PLATELET # BLD AUTO: 309 K/UL — SIGNIFICANT CHANGE UP (ref 150–400)
PMV BLD: 10.5 FL — SIGNIFICANT CHANGE UP (ref 7–13)
POTASSIUM SERPL-MCNC: 3.8 MMOL/L — SIGNIFICANT CHANGE UP (ref 3.5–5.3)
POTASSIUM SERPL-SCNC: 3.8 MMOL/L — SIGNIFICANT CHANGE UP (ref 3.5–5.3)
RBC # BLD: 5.06 M/UL — SIGNIFICANT CHANGE UP (ref 3.8–5.2)
RBC # FLD: 14.3 % — SIGNIFICANT CHANGE UP (ref 10.3–14.5)
SODIUM SERPL-SCNC: 142 MMOL/L — SIGNIFICANT CHANGE UP (ref 135–145)
WBC # BLD: 8.36 K/UL — SIGNIFICANT CHANGE UP (ref 3.8–10.5)
WBC # FLD AUTO: 8.36 K/UL — SIGNIFICANT CHANGE UP (ref 3.8–10.5)

## 2019-02-04 PROCEDURE — 93010 ELECTROCARDIOGRAM REPORT: CPT

## 2019-02-04 RX ORDER — LOSARTAN POTASSIUM 100 MG/1
1 TABLET, FILM COATED ORAL
Qty: 0 | Refills: 0 | COMMUNITY

## 2019-02-04 RX ORDER — CHOLECALCIFEROL (VITAMIN D3) 125 MCG
1 CAPSULE ORAL
Qty: 0 | Refills: 0 | COMMUNITY

## 2019-02-04 NOTE — H&P PST ADULT - NEGATIVE OPHTHALMOLOGIC SYMPTOMS
no discharge R/no irritation L/no lacrimation L/no discharge L/no pain L/no irritation R/no diplopia/no photophobia/no pain R/no lacrimation R

## 2019-02-04 NOTE — H&P PST ADULT - ASSESSMENT
Home
Pre op diagnosis: noninfective gastroenteritis and colitis,  Pt is scheduled for colonoscopy - anesthesia on 2/8/2019 with Dr. Toney.

## 2019-02-04 NOTE — H&P PST ADULT - OTHER CARE PROVIDERS
Psych [facility] [phone]                                 Dr. Malachi Herrera-[phone]-Dentist

## 2019-02-04 NOTE — H&P PST ADULT - MUSCULOSKELETAL
details… detailed exam normal strength/no joint swelling/ROM intact/no calf tenderness/no joint warmth/no joint erythema

## 2019-02-04 NOTE — H&P PST ADULT - PROBLEM SELECTOR PLAN 3
OR booking notified of pt's DM status via fax.  Pt instructed last dose of Metformin is on 2/7/2019 am - hold 24 hours prior to surgery.

## 2019-02-04 NOTE — H&P PST ADULT - RS GEN PE MLT RESP DETAILS PC
respirations non-labored/airway patent/no wheezes/no rhonchi/good air movement/no rales/breath sounds equal/clear to auscultation bilaterally

## 2019-02-04 NOTE — H&P PST ADULT - NEGATIVE ALLERGY TYPES
no indoor environmental allergies/no reactions to medicines/no outdoor environmental allergies/no reactions to food/no reactions to animals/no reactions to insect bites

## 2019-02-04 NOTE — H&P PST ADULT - NEGATIVE ENMT SYMPTOMS
no nasal congestion/no post-nasal discharge/no dysphagia/no throat pain/no nasal obstruction/no gum bleeding/no sinus symptoms/no ear pain/no tinnitus/no nose bleeds/no recurrent cold sores/no abnormal taste sensation/no dry mouth/no hearing difficulty

## 2019-02-04 NOTE — H&P PST ADULT - NEGATIVE GENERAL GENITOURINARY SYMPTOMS
no flank pain R/no bladder infections/no dysuria/no urinary hesitancy/normal urinary frequency/no flank pain L/no incontinence/no hematuria

## 2019-02-04 NOTE — H&P PST ADULT - PRIMARY CARE PROVIDER
Dr. Jose Maria Diehl(PCP)[phone]                           Dr. Kenya Burrell(Cardiology) [phone]

## 2019-02-04 NOTE — H&P PST ADULT - PROBLEM SELECTOR PLAN 1
Pt is scheduled for colonoscopy - anesthesia on 2/8/2019 with Dr. Toney.    Preop instructions, pepcid, surgical scrub provided. Pt stated understanding.    Pending medical evaluation per surgeon and PST- Patient is a poor historian -Dr. Jsoe Maria Diehl(PCP)[phone].     Pending cardiology evaluation per surgeon and PST- Patient reports Dyspnea on exertion - scheduled for ECHO per patient on 2/7/2019-  Dr. Kenya Burrell(Cardiology) [phone].

## 2019-02-04 NOTE — H&P PST ADULT - NEUROLOGICAL DETAILS
normal strength/sensation intact/alert and oriented x 3/responds to pain/responds to verbal commands

## 2019-02-04 NOTE — H&P PST ADULT - NEGATIVE NEUROLOGICAL SYMPTOMS
no headache/no difficulty walking/no focal seizures/no generalized seizures/no loss of sensation/no transient paralysis/no weakness/no facial palsy/no confusion

## 2019-02-04 NOTE — H&P PST ADULT - HISTORY OF PRESENT ILLNESS
78 year old female with a pst medical history of noninfective gastroenteritis and colitis presents to pre surgical testing. Pt reports she has been having intermittent loose stools for 2 years, taking loperamide as needed with limited relief.  Pt reports symptoms are persistent.  Pt is scheduled for colonoscopy - anesthesia on 2/8/2019 with Dr. Toney.      Patient reports having cancelled the above procedure twice " Because I had problems with my son, but my cardiologist never cleared me for surgery, anyways"

## 2019-02-06 ENCOUNTER — APPOINTMENT (OUTPATIENT)
Dept: CV DIAGNOSTICS | Facility: HOSPITAL | Age: 79
End: 2019-02-06
Payer: MEDICARE

## 2019-02-06 ENCOUNTER — APPOINTMENT (OUTPATIENT)
Dept: CV DIAGNOSITCS | Facility: HOSPITAL | Age: 79
End: 2019-02-06
Payer: MEDICARE

## 2019-02-06 PROCEDURE — 93018 CV STRESS TEST I&R ONLY: CPT | Mod: GC

## 2019-02-06 PROCEDURE — 93016 CV STRESS TEST SUPVJ ONLY: CPT | Mod: GC

## 2019-02-06 PROCEDURE — 78452 HT MUSCLE IMAGE SPECT MULT: CPT | Mod: 26

## 2019-02-06 PROCEDURE — 93306 TTE W/DOPPLER COMPLETE: CPT | Mod: 26

## 2019-02-12 ENCOUNTER — OUTPATIENT (OUTPATIENT)
Dept: OUTPATIENT SERVICES | Facility: HOSPITAL | Age: 79
LOS: 1 days | Discharge: HOME | End: 2019-02-12
Payer: MEDICARE

## 2019-02-12 ENCOUNTER — RESULT REVIEW (OUTPATIENT)
Age: 79
End: 2019-02-12

## 2019-02-12 DIAGNOSIS — Z86.69 PERSONAL HISTORY OF OTHER DISEASES OF THE NERVOUS SYSTEM AND SENSE ORGANS: Chronic | ICD-10-CM

## 2019-02-12 DIAGNOSIS — Z90.711 ACQUIRED ABSENCE OF UTERUS WITH REMAINING CERVICAL STUMP: Chronic | ICD-10-CM

## 2019-02-12 DIAGNOSIS — K52.9 NONINFECTIVE GASTROENTERITIS AND COLITIS, UNSPECIFIED: ICD-10-CM

## 2019-02-12 LAB — GLUCOSE BLDC GLUCOMTR-MCNC: 180 MG/DL — HIGH (ref 70–99)

## 2019-02-12 PROCEDURE — 45380 COLONOSCOPY AND BIOPSY: CPT | Mod: GC

## 2019-02-12 PROCEDURE — 88305 TISSUE EXAM BY PATHOLOGIST: CPT | Mod: 26

## 2019-02-15 LAB — SURGICAL PATHOLOGY STUDY: SIGNIFICANT CHANGE UP

## 2019-02-27 ENCOUNTER — RX RENEWAL (OUTPATIENT)
Age: 79
End: 2019-02-27

## 2019-03-13 PROBLEM — Z63.4 WIDOWED: Status: ACTIVE | Noted: 2019-03-13

## 2019-03-19 ENCOUNTER — APPOINTMENT (OUTPATIENT)
Dept: MAMMOGRAPHY | Facility: IMAGING CENTER | Age: 79
End: 2019-03-19

## 2019-03-28 ENCOUNTER — APPOINTMENT (OUTPATIENT)
Dept: GASTROENTEROLOGY | Facility: HOSPITAL | Age: 79
End: 2019-03-28

## 2019-04-25 ENCOUNTER — APPOINTMENT (OUTPATIENT)
Dept: INTERNAL MEDICINE | Facility: HOSPITAL | Age: 79
End: 2019-04-25

## 2019-05-16 ENCOUNTER — OUTPATIENT (OUTPATIENT)
Dept: OUTPATIENT SERVICES | Facility: HOSPITAL | Age: 79
LOS: 1 days | End: 2019-05-16
Payer: COMMERCIAL

## 2019-05-16 ENCOUNTER — APPOINTMENT (OUTPATIENT)
Dept: MAMMOGRAPHY | Facility: IMAGING CENTER | Age: 79
End: 2019-05-16
Payer: MEDICARE

## 2019-05-16 DIAGNOSIS — Z86.69 PERSONAL HISTORY OF OTHER DISEASES OF THE NERVOUS SYSTEM AND SENSE ORGANS: Chronic | ICD-10-CM

## 2019-05-16 DIAGNOSIS — Z90.711 ACQUIRED ABSENCE OF UTERUS WITH REMAINING CERVICAL STUMP: Chronic | ICD-10-CM

## 2019-05-16 DIAGNOSIS — Z00.8 ENCOUNTER FOR OTHER GENERAL EXAMINATION: ICD-10-CM

## 2019-05-16 PROCEDURE — 77067 SCR MAMMO BI INCL CAD: CPT

## 2019-05-16 PROCEDURE — 77063 BREAST TOMOSYNTHESIS BI: CPT | Mod: 26

## 2019-05-16 PROCEDURE — 77067 SCR MAMMO BI INCL CAD: CPT | Mod: 26

## 2019-05-16 PROCEDURE — 77063 BREAST TOMOSYNTHESIS BI: CPT

## 2019-05-21 DIAGNOSIS — N63.10 UNSPECIFIED LUMP IN THE RIGHT BREAST, UNSPECIFIED QUADRANT: ICD-10-CM

## 2019-05-30 ENCOUNTER — APPOINTMENT (OUTPATIENT)
Dept: ULTRASOUND IMAGING | Facility: IMAGING CENTER | Age: 79
End: 2019-05-30

## 2019-06-04 ENCOUNTER — APPOINTMENT (OUTPATIENT)
Dept: ULTRASOUND IMAGING | Facility: IMAGING CENTER | Age: 79
End: 2019-06-04

## 2019-06-06 ENCOUNTER — APPOINTMENT (OUTPATIENT)
Dept: GASTROENTEROLOGY | Facility: CLINIC | Age: 79
End: 2019-06-06

## 2019-06-18 ENCOUNTER — APPOINTMENT (OUTPATIENT)
Dept: INTERNAL MEDICINE | Facility: CLINIC | Age: 79
End: 2019-06-18

## 2019-06-25 ENCOUNTER — RX RENEWAL (OUTPATIENT)
Age: 79
End: 2019-06-25

## 2019-06-25 ENCOUNTER — APPOINTMENT (OUTPATIENT)
Dept: INTERNAL MEDICINE | Facility: CLINIC | Age: 79
End: 2019-06-25

## 2019-07-01 ENCOUNTER — OUTPATIENT (OUTPATIENT)
Dept: OUTPATIENT SERVICES | Facility: HOSPITAL | Age: 79
LOS: 1 days | End: 2019-07-01
Payer: MEDICARE

## 2019-07-01 DIAGNOSIS — Z90.711 ACQUIRED ABSENCE OF UTERUS WITH REMAINING CERVICAL STUMP: Chronic | ICD-10-CM

## 2019-07-01 DIAGNOSIS — Z86.69 PERSONAL HISTORY OF OTHER DISEASES OF THE NERVOUS SYSTEM AND SENSE ORGANS: Chronic | ICD-10-CM

## 2019-07-01 PROCEDURE — G9001: CPT

## 2019-07-19 ENCOUNTER — INPATIENT (INPATIENT)
Facility: HOSPITAL | Age: 79
LOS: 5 days | Discharge: NURSING, REHAB OR LONG-TERM CARE | End: 2019-07-25
Attending: HOSPITALIST | Admitting: HOSPITALIST
Payer: MEDICARE

## 2019-07-19 VITALS
DIASTOLIC BLOOD PRESSURE: 53 MMHG | HEART RATE: 86 BPM | SYSTOLIC BLOOD PRESSURE: 127 MMHG | RESPIRATION RATE: 18 BRPM | OXYGEN SATURATION: 100 % | TEMPERATURE: 98 F

## 2019-07-19 DIAGNOSIS — Z90.711 ACQUIRED ABSENCE OF UTERUS WITH REMAINING CERVICAL STUMP: Chronic | ICD-10-CM

## 2019-07-19 DIAGNOSIS — Z86.69 PERSONAL HISTORY OF OTHER DISEASES OF THE NERVOUS SYSTEM AND SENSE ORGANS: Chronic | ICD-10-CM

## 2019-07-19 LAB
ALBUMIN SERPL ELPH-MCNC: 4 G/DL — SIGNIFICANT CHANGE UP (ref 3.3–5)
ALP SERPL-CCNC: 70 U/L — SIGNIFICANT CHANGE UP (ref 40–120)
ALT FLD-CCNC: 12 U/L — SIGNIFICANT CHANGE UP (ref 4–33)
ANION GAP SERPL CALC-SCNC: 14 MMO/L — SIGNIFICANT CHANGE UP (ref 7–14)
APTT BLD: 27.6 SEC — SIGNIFICANT CHANGE UP (ref 27.5–36.3)
AST SERPL-CCNC: 16 U/L — SIGNIFICANT CHANGE UP (ref 4–32)
BASOPHILS # BLD AUTO: 0.06 K/UL — SIGNIFICANT CHANGE UP (ref 0–0.2)
BASOPHILS NFR BLD AUTO: 0.6 % — SIGNIFICANT CHANGE UP (ref 0–2)
BILIRUB SERPL-MCNC: 0.3 MG/DL — SIGNIFICANT CHANGE UP (ref 0.2–1.2)
BLD GP AB SCN SERPL QL: NEGATIVE — SIGNIFICANT CHANGE UP
BUN SERPL-MCNC: 19 MG/DL — SIGNIFICANT CHANGE UP (ref 7–23)
CALCIUM SERPL-MCNC: 10.2 MG/DL — SIGNIFICANT CHANGE UP (ref 8.4–10.5)
CHLORIDE SERPL-SCNC: 102 MMOL/L — SIGNIFICANT CHANGE UP (ref 98–107)
CO2 SERPL-SCNC: 25 MMOL/L — SIGNIFICANT CHANGE UP (ref 22–31)
CREAT SERPL-MCNC: 0.85 MG/DL — SIGNIFICANT CHANGE UP (ref 0.5–1.3)
EOSINOPHIL # BLD AUTO: 0.11 K/UL — SIGNIFICANT CHANGE UP (ref 0–0.5)
EOSINOPHIL NFR BLD AUTO: 1 % — SIGNIFICANT CHANGE UP (ref 0–6)
GLUCOSE SERPL-MCNC: 150 MG/DL — HIGH (ref 70–99)
HCT VFR BLD CALC: 39.6 % — SIGNIFICANT CHANGE UP (ref 34.5–45)
HGB BLD-MCNC: 12.5 G/DL — SIGNIFICANT CHANGE UP (ref 11.5–15.5)
IMM GRANULOCYTES NFR BLD AUTO: 0.2 % — SIGNIFICANT CHANGE UP (ref 0–1.5)
INR BLD: 0.87 — LOW (ref 0.88–1.17)
LYMPHOCYTES # BLD AUTO: 39.3 % — SIGNIFICANT CHANGE UP (ref 13–44)
LYMPHOCYTES # BLD AUTO: 4.21 K/UL — HIGH (ref 1–3.3)
MAGNESIUM SERPL-MCNC: 1.7 MG/DL — SIGNIFICANT CHANGE UP (ref 1.6–2.6)
MCHC RBC-ENTMCNC: 27.4 PG — SIGNIFICANT CHANGE UP (ref 27–34)
MCHC RBC-ENTMCNC: 31.6 % — LOW (ref 32–36)
MCV RBC AUTO: 86.8 FL — SIGNIFICANT CHANGE UP (ref 80–100)
MONOCYTES # BLD AUTO: 0.75 K/UL — SIGNIFICANT CHANGE UP (ref 0–0.9)
MONOCYTES NFR BLD AUTO: 7 % — SIGNIFICANT CHANGE UP (ref 2–14)
NEUTROPHILS # BLD AUTO: 5.55 K/UL — SIGNIFICANT CHANGE UP (ref 1.8–7.4)
NEUTROPHILS NFR BLD AUTO: 51.9 % — SIGNIFICANT CHANGE UP (ref 43–77)
NRBC # FLD: 0 K/UL — SIGNIFICANT CHANGE UP (ref 0–0)
PHOSPHATE SERPL-MCNC: 3 MG/DL — SIGNIFICANT CHANGE UP (ref 2.5–4.5)
PLATELET # BLD AUTO: 276 K/UL — SIGNIFICANT CHANGE UP (ref 150–400)
PMV BLD: 10 FL — SIGNIFICANT CHANGE UP (ref 7–13)
POTASSIUM SERPL-MCNC: 4.3 MMOL/L — SIGNIFICANT CHANGE UP (ref 3.5–5.3)
POTASSIUM SERPL-SCNC: 4.3 MMOL/L — SIGNIFICANT CHANGE UP (ref 3.5–5.3)
PROT SERPL-MCNC: 7.2 G/DL — SIGNIFICANT CHANGE UP (ref 6–8.3)
PROTHROM AB SERPL-ACNC: 9.9 SEC — SIGNIFICANT CHANGE UP (ref 9.8–13.1)
RBC # BLD: 4.56 M/UL — SIGNIFICANT CHANGE UP (ref 3.8–5.2)
RBC # FLD: 13.8 % — SIGNIFICANT CHANGE UP (ref 10.3–14.5)
RH IG SCN BLD-IMP: POSITIVE — SIGNIFICANT CHANGE UP
SODIUM SERPL-SCNC: 141 MMOL/L — SIGNIFICANT CHANGE UP (ref 135–145)
TROPONIN T, HIGH SENSITIVITY: 7 NG/L — SIGNIFICANT CHANGE UP (ref ?–14)
WBC # BLD: 10.7 K/UL — HIGH (ref 3.8–10.5)
WBC # FLD AUTO: 10.7 K/UL — HIGH (ref 3.8–10.5)

## 2019-07-19 PROCEDURE — 71045 X-RAY EXAM CHEST 1 VIEW: CPT | Mod: 26

## 2019-07-19 PROCEDURE — 72192 CT PELVIS W/O DYE: CPT | Mod: 26

## 2019-07-19 PROCEDURE — 73502 X-RAY EXAM HIP UNI 2-3 VIEWS: CPT | Mod: 26,LT

## 2019-07-19 PROCEDURE — 73700 CT LOWER EXTREMITY W/O DYE: CPT | Mod: 26,LT

## 2019-07-19 PROCEDURE — 70450 CT HEAD/BRAIN W/O DYE: CPT | Mod: 26

## 2019-07-19 PROCEDURE — 73552 X-RAY EXAM OF FEMUR 2/>: CPT | Mod: 26,LT

## 2019-07-19 PROCEDURE — 72125 CT NECK SPINE W/O DYE: CPT | Mod: 26

## 2019-07-19 RX ORDER — ACETAMINOPHEN 500 MG
975 TABLET ORAL ONCE
Refills: 0 | Status: COMPLETED | OUTPATIENT
Start: 2019-07-19 | End: 2019-07-19

## 2019-07-19 RX ADMIN — Medication 975 MILLIGRAM(S): at 20:27

## 2019-07-19 NOTE — ED ADULT NURSE NOTE - OBJECTIVE STATEMENT
received pt to rm 5, Pt c/o left lower back pain radiating down leg since fall Wednesday, Thursday night, and last night after losing her balance.  Daughter in law states pt falls a lot.  No assistive devices at home. Denies head trauma or LOC.  Denies CP/SOB, dizziness, lightheadedness prior to events,

## 2019-07-19 NOTE — ED PROVIDER NOTE - CLINICAL SUMMARY MEDICAL DECISION MAKING FREE TEXT BOX
77 yo F, presenting from home d/t frequent falls, last two of which were unwitnessed, and for which patient does not recall the details. Patient only endorses L low back and L hip pain, but denies other complaints. Concern for syncope, frequent falls, fractures. Will obtain ct head and c spine, xrays of chest, pelvis, hip, femur. Will obtain labs, ekg, and treat pain. Reassess.

## 2019-07-19 NOTE — ED ADULT NURSE NOTE - NSIMPLEMENTINTERV_GEN_ALL_ED
Implemented All Fall Risk Interventions:  [place] to call system. Call bell, personal items and telephone within reach. Instruct patient to call for assistance. Room bathroom lighting operational. Non-slip footwear when patient is off stretcher. Physically safe environment: no spills, clutter or unnecessary equipment. Stretcher in lowest position, wheels locked, appropriate side rails in place. Provide visual cue, wrist band, yellow gown, etc. Monitor gait and stability. Monitor for mental status changes and reorient to person, place, and time. Review medications for side effects contributing to fall risk. Reinforce activity limits and safety measures with patient and family.

## 2019-07-19 NOTE — ED PROVIDER NOTE - CARE PLAN
Principal Discharge DX:	Fall Principal Discharge DX:	Left hip pain  Secondary Diagnosis:	Left knee pain  Secondary Diagnosis:	Frequent falls  Secondary Diagnosis:	Syncope and collapse

## 2019-07-19 NOTE — ED PROVIDER NOTE - ATTENDING CONTRIBUTION TO CARE
Guera Tavarez M.D: 78F hx anxiety/depression, htn, dm, hypothyroid, BIBfamily for frequent falls in the last few weeks. notes 2 unwitnessed falls this week-wed and thurs night, in which she was rushing to the bathroom and next thing she knew she was on the floor. unsure about loc, denies any cp sob dizziness or lightheadedness before or after falls. after the second fall noting significant left hip and knee pain and inability to ambulate. no HA no neck pain no abd pain no dysurai no f/c.   agree with exam as documented by resident, but in addition pt with pain to left hip with axial loading.    A/P78F w/ multiple medicla problems p/w frequent falls including 2 in the last 2 days now with left hip pain and inability to ambulate. pt with unclear history around fall concern for possible syncopal episodes causing falls--will check labs to assess for lyte abnormalities, anemia, infection (UTI?), EKG, trop. given multiple falls will check imaging to assess for any traumatic injury--ct head, neck, cxr, pelvis xr, left hip and left femur xrs. if xrs negative for fx will pursue ct pelvis/hip as high suspicion for pelvic or hip fracture. pt to require admission at minimum for syncope eval and PT eval.

## 2019-07-19 NOTE — ED ADULT TRIAGE NOTE - CHIEF COMPLAINT QUOTE
Pt c/o left lower back pain radiating down leg since fall Wednesday, Thursday night, and last night after losing her balance.  Daughter in law states pt falls a lot.  No assistive devices at home.  DM but doesn't check her BG.  Denies head trauma or LOC.  Denies CP/SOB, dizziness, lightheadedness prior to events

## 2019-07-19 NOTE — ED PROVIDER NOTE - PROGRESS NOTE DETAILS
XR nondiagnostic, CTs ordered CTs showing L. hip fx. discussed with ortho who will come eval pt. family updated to findings. ortho evaluated pt feel unlikely hip fx recommend medicine admission for evaluation of syncope and MRI hip

## 2019-07-19 NOTE — ED PROVIDER NOTE - OBJECTIVE STATEMENT
77 yo F, accompanied by family, presenting from home w/ chief complaint of frequent falls, including two this week, as well as L low back pain and L hip pain. Patient does not use any assistive devices to ambulate around the house. She lives with her son, who has numerous medical problems, and helps take care of him. She has been falling frequently. Patient states that she loses her balance, but has difficulty remembering the details of her two falls this week, both of which were unwitnessed. She endorses L low back pain and L hip pain, and has not walked since her most frequent fall. She denies headache, neck pain, or other complaints.

## 2019-07-20 DIAGNOSIS — E11.9 TYPE 2 DIABETES MELLITUS WITHOUT COMPLICATIONS: ICD-10-CM

## 2019-07-20 DIAGNOSIS — Z01.818 ENCOUNTER FOR OTHER PREPROCEDURAL EXAMINATION: ICD-10-CM

## 2019-07-20 DIAGNOSIS — F41.9 ANXIETY DISORDER, UNSPECIFIED: ICD-10-CM

## 2019-07-20 DIAGNOSIS — R55 SYNCOPE AND COLLAPSE: ICD-10-CM
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
## 2019-07-20 DIAGNOSIS — E03.9 HYPOTHYROIDISM, UNSPECIFIED: ICD-10-CM

## 2019-07-20 DIAGNOSIS — Z29.9 ENCOUNTER FOR PROPHYLACTIC MEASURES, UNSPECIFIED: ICD-10-CM

## 2019-07-20 DIAGNOSIS — I10 ESSENTIAL (PRIMARY) HYPERTENSION: ICD-10-CM

## 2019-07-20 DIAGNOSIS — R29.6 REPEATED FALLS: ICD-10-CM

## 2019-07-20 LAB
ANION GAP SERPL CALC-SCNC: 11 MMO/L — SIGNIFICANT CHANGE UP (ref 7–14)
APTT BLD: 27.3 SEC — LOW (ref 27.5–36.3)
BUN SERPL-MCNC: 13 MG/DL — SIGNIFICANT CHANGE UP (ref 7–23)
CALCIUM SERPL-MCNC: 10.1 MG/DL — SIGNIFICANT CHANGE UP (ref 8.4–10.5)
CHLORIDE SERPL-SCNC: 104 MMOL/L — SIGNIFICANT CHANGE UP (ref 98–107)
CO2 SERPL-SCNC: 28 MMOL/L — SIGNIFICANT CHANGE UP (ref 22–31)
CREAT SERPL-MCNC: 0.82 MG/DL — SIGNIFICANT CHANGE UP (ref 0.5–1.3)
GLUCOSE SERPL-MCNC: 170 MG/DL — HIGH (ref 70–99)
HCT VFR BLD CALC: 37.8 % — SIGNIFICANT CHANGE UP (ref 34.5–45)
HGB BLD-MCNC: 12 G/DL — SIGNIFICANT CHANGE UP (ref 11.5–15.5)
INR BLD: 0.99 — SIGNIFICANT CHANGE UP (ref 0.88–1.17)
MAGNESIUM SERPL-MCNC: 1.8 MG/DL — SIGNIFICANT CHANGE UP (ref 1.6–2.6)
MCHC RBC-ENTMCNC: 27 PG — SIGNIFICANT CHANGE UP (ref 27–34)
MCHC RBC-ENTMCNC: 31.7 % — LOW (ref 32–36)
MCV RBC AUTO: 84.9 FL — SIGNIFICANT CHANGE UP (ref 80–100)
NRBC # FLD: 0 K/UL — SIGNIFICANT CHANGE UP (ref 0–0)
PLATELET # BLD AUTO: 256 K/UL — SIGNIFICANT CHANGE UP (ref 150–400)
PMV BLD: 9.4 FL — SIGNIFICANT CHANGE UP (ref 7–13)
POTASSIUM SERPL-MCNC: 4.1 MMOL/L — SIGNIFICANT CHANGE UP (ref 3.5–5.3)
POTASSIUM SERPL-SCNC: 4.1 MMOL/L — SIGNIFICANT CHANGE UP (ref 3.5–5.3)
PROTHROM AB SERPL-ACNC: 11 SEC — SIGNIFICANT CHANGE UP (ref 9.8–13.1)
RBC # BLD: 4.45 M/UL — SIGNIFICANT CHANGE UP (ref 3.8–5.2)
RBC # FLD: 13.6 % — SIGNIFICANT CHANGE UP (ref 10.3–14.5)
SODIUM SERPL-SCNC: 143 MMOL/L — SIGNIFICANT CHANGE UP (ref 135–145)
WBC # BLD: 8.81 K/UL — SIGNIFICANT CHANGE UP (ref 3.8–10.5)
WBC # FLD AUTO: 8.81 K/UL — SIGNIFICANT CHANGE UP (ref 3.8–10.5)

## 2019-07-20 PROCEDURE — 93306 TTE W/DOPPLER COMPLETE: CPT | Mod: 26

## 2019-07-20 PROCEDURE — 12345: CPT | Mod: NC

## 2019-07-20 PROCEDURE — 99223 1ST HOSP IP/OBS HIGH 75: CPT

## 2019-07-20 PROCEDURE — 73562 X-RAY EXAM OF KNEE 3: CPT | Mod: 26,LT

## 2019-07-20 RX ORDER — SODIUM CHLORIDE 9 MG/ML
1000 INJECTION, SOLUTION INTRAVENOUS
Refills: 0 | Status: DISCONTINUED | OUTPATIENT
Start: 2019-07-20 | End: 2019-07-25

## 2019-07-20 RX ORDER — VENLAFAXINE HCL 75 MG
225 CAPSULE, EXT RELEASE 24 HR ORAL DAILY
Refills: 0 | Status: DISCONTINUED | OUTPATIENT
Start: 2019-07-20 | End: 2019-07-25

## 2019-07-20 RX ORDER — DEXTROSE 50 % IN WATER 50 %
25 SYRINGE (ML) INTRAVENOUS ONCE
Refills: 0 | Status: DISCONTINUED | OUTPATIENT
Start: 2019-07-20 | End: 2019-07-25

## 2019-07-20 RX ORDER — VENLAFAXINE HCL 75 MG
1.5 CAPSULE, EXT RELEASE 24 HR ORAL
Qty: 0 | Refills: 0 | DISCHARGE

## 2019-07-20 RX ORDER — GLUCAGON INJECTION, SOLUTION 0.5 MG/.1ML
1 INJECTION, SOLUTION SUBCUTANEOUS ONCE
Refills: 0 | Status: DISCONTINUED | OUTPATIENT
Start: 2019-07-20 | End: 2019-07-25

## 2019-07-20 RX ORDER — GABAPENTIN 400 MG/1
1 CAPSULE ORAL
Qty: 0 | Refills: 0 | DISCHARGE

## 2019-07-20 RX ORDER — DEXTROSE 50 % IN WATER 50 %
15 SYRINGE (ML) INTRAVENOUS ONCE
Refills: 0 | Status: DISCONTINUED | OUTPATIENT
Start: 2019-07-20 | End: 2019-07-25

## 2019-07-20 RX ORDER — LOPERAMIDE HCL 2 MG
1 TABLET ORAL
Qty: 0 | Refills: 0 | DISCHARGE

## 2019-07-20 RX ORDER — VENLAFAXINE HCL 75 MG
1 CAPSULE, EXT RELEASE 24 HR ORAL
Qty: 0 | Refills: 0 | DISCHARGE

## 2019-07-20 RX ORDER — HEPARIN SODIUM 5000 [USP'U]/ML
5000 INJECTION INTRAVENOUS; SUBCUTANEOUS EVERY 8 HOURS
Refills: 0 | Status: DISCONTINUED | OUTPATIENT
Start: 2019-07-20 | End: 2019-07-20

## 2019-07-20 RX ORDER — METFORMIN HYDROCHLORIDE 850 MG/1
1 TABLET ORAL
Qty: 0 | Refills: 0 | DISCHARGE

## 2019-07-20 RX ORDER — DEXTROSE 50 % IN WATER 50 %
12.5 SYRINGE (ML) INTRAVENOUS ONCE
Refills: 0 | Status: DISCONTINUED | OUTPATIENT
Start: 2019-07-20 | End: 2019-07-25

## 2019-07-20 RX ORDER — INSULIN LISPRO 100/ML
VIAL (ML) SUBCUTANEOUS
Refills: 0 | Status: DISCONTINUED | OUTPATIENT
Start: 2019-07-20 | End: 2019-07-25

## 2019-07-20 RX ORDER — LEVOTHYROXINE SODIUM 125 MCG
1 TABLET ORAL
Qty: 0 | Refills: 0 | DISCHARGE

## 2019-07-20 RX ORDER — INSULIN LISPRO 100/ML
VIAL (ML) SUBCUTANEOUS AT BEDTIME
Refills: 0 | Status: DISCONTINUED | OUTPATIENT
Start: 2019-07-20 | End: 2019-07-25

## 2019-07-20 RX ADMIN — Medication 225 MILLIGRAM(S): at 14:27

## 2019-07-20 RX ADMIN — Medication 10 MILLIGRAM(S): at 06:11

## 2019-07-20 RX ADMIN — Medication 10 MILLIGRAM(S): at 14:27

## 2019-07-20 RX ADMIN — Medication 10 MILLIGRAM(S): at 22:01

## 2019-07-20 NOTE — PHYSICAL THERAPY INITIAL EVALUATION ADULT - GAIT DEVIATIONS NOTED, PT EVAL
decreased christiano/decreased step length/increased time in double stance/decreased velocity of limb motion/decreased stride length

## 2019-07-20 NOTE — CONSULT NOTE ADULT - ASSESSMENT
78y Female with multiple recent mechanical/syncopal falls presenting with L hip/knee pain and new-onset inability to ambulate, possible nondisplaced fracture noted on hip CT.      - Pain control  - FU lab values  - L knee xrays  - L hip MRI to rule out occult fracture  - syncopal workup per medicine team  - will discuss plan with attending on call, Dr. Bustillos, and advise if above plan changes

## 2019-07-20 NOTE — H&P ADULT - PROBLEM SELECTOR PLAN 7
- Restart home dose regimen Effexor with buspirone.   - Monitor medication SE.  - Consider psych consult in the am for med management.

## 2019-07-20 NOTE — CHART NOTE - NSCHARTNOTEFT_GEN_A_CORE
pt seen and examined with Dr. Bustillos. Pt identified her pain as being in the knee, and has improved. She was able to get up and ambulate with minimal assistance.     -No need to go to OR. Low suspicion for hip fracture. Contusion to hip and knee, with exacerbation of OA  -Can resume regular diet  -No need for MRI  -WBAT, PT/OT  -Call back if exam changes or if you have further questions  -Ortho [id number]

## 2019-07-20 NOTE — H&P ADULT - NSHPPHYSICALEXAM_GEN_ALL_CORE
T(C): 36.7 (07-20-19 @ 02:05), Max: 36.9 (07-19-19 @ 23:56)  HR: 87 (07-20-19 @ 02:05) (86 - 96)  BP: 108/53 (07-20-19 @ 02:05) (108/53 - 152/72)  RR: 18 (07-20-19 @ 02:05) (18 - 18)  SpO2: 98% (07-20-19 @ 02:05) (96% - 100%)  Wt(kg): --  GENERAL: NAD, well-developed  HEAD:  Atraumatic, Normocephalic  EYES: EOMI, PERRLA, conjunctiva and sclera clear  NECK: Supple, No JVD  CHEST/LUNG: Clear to auscultation bilaterally; No wheeze  HEART: Regular rate and rhythm; No murmurs, rubs, or gallops  ABDOMEN: Soft, Nontender, Nondistended; Bowel sounds present  EXTREMITIES:  2+ Peripheral Pulses, No clubbing, cyanosis, or edema  PSYCH: AAOx3  MSK: difficulty with ROM at hip joint  NEUROLOGY: non-focal  SKIN: bruises in LLE

## 2019-07-20 NOTE — H&P ADULT - NSHPREVIEWOFSYSTEMS_GEN_ALL_CORE
CONSTITUTIONAL: weakness, no f/c/  EYES/ENT: No visual changes;  No vertigo or throat pain   NECK: No pain or stiffness  RESPIRATORY: No cough, wheezing, hemoptysis; No shortness of breath  CARDIOVASCULAR: No chest pain or palpitations  GASTROINTESTINAL: No abdominal or epigastric pain. No nausea, vomiting, or hematemesis; No diarrhea or constipation. No melena or hematochezia.  GENITOURINARY: No dysuria, frequency or hematuria  NEUROLOGICAL: No numbness or weakness  SKIN: No itching, burning, rashes, or lesions   MSK: left knee pain and left hip pain after fall  PSYCH: No Depression, no anxiety  All other review of systems is negative unless indicated above.

## 2019-07-20 NOTE — PROGRESS NOTE ADULT - PROBLEM SELECTOR PLAN 2
- pt denies syncope, but admits to dizziness  -cardiology consult appreciated, suspicion for polypharmacy  check orthostatics, psych consult  to review psych meds   -EKG sinus with nonspecific T wave changes  -echo ordered (echo was unremarkable in 2/2019 per cardiology) - pt denies syncope, but admits to dizziness  -cardiology consult appreciated, suspicion for polypharmacy  check orthostatics, psych consult  to review psych meds   -EKG sinus with nonspecific T wave changes  -Echo 7/20 normal LVEF 73%, MAC  Also unremarkable echo in 2/2019

## 2019-07-20 NOTE — H&P ADULT - NSICDXPASTMEDICALHX_GEN_ALL_CORE_FT
PAST MEDICAL HISTORY:  Anxiety     Depression     DM (diabetes mellitus) type 2    HTN (hypertension)     Hypothyroidism     IBS (irritable bowel syndrome)     Noninfective gastroenteritis and colitis     Panic disorder     Sleep apnea Patient reports she does not use CPAP

## 2019-07-20 NOTE — H&P ADULT - PROBLEM SELECTOR PLAN 5
- BP well controlled on exam.  - Restart amlodipine (amlodipine could be accounting for her reported LE swelling).

## 2019-07-20 NOTE — H&P ADULT - PROBLEM SELECTOR PLAN 2
- Hx partially c/w syncope, requiring further w/u.  - Admission labs largely unremarkable.  - Unlikely ACS/CAD as patient EKG at baseline and trop negative with sx. (recent stress test negative)  - Admit to telemetry for monitoring to r/o arrythmia.  - Given sx of PHAN and reported LE swelling, will repeat echo.  - Also consider EEG to r/u seizure disorder.  - Lastly, patient's recurrent fall likely 2/2 polypharmacy or thyroid related sx. As she carries dx of hypothyroidism, however not currently taking synthroid. Patient with anxiety disorder requiring heightened dose of Effexor for sx control. Will consult psych in the am for further management. - Hx partially c/w syncope, requiring further w/u.  - Admission labs largely unremarkable.  - Unlikely ACS/CAD as patient EKG at baseline and trop negative with negative sx. (recent stress test negative)  - Admit to telemetry for monitoring to r/o arrythmia.  - Given sx of PHAN and reported LE swelling, will repeat echo.  - Also consider EEG to r/u seizure disorder.  - Lastly, patient's recurrent fall likely 2/2 polypharmacy or thyroid related sx. As she carries dx of hypothyroidism, however not currently taking synthroid. Patient with anxiety disorder requiring heightened dose of Effexor for sx control. Will consult psych in the am for further management.

## 2019-07-20 NOTE — PROGRESS NOTE ADULT - PROBLEM SELECTOR PLAN 4
- BP well controlled on exam  -clarify outpt meds ?norvasc  -bp controlled, monitor bp -clarify outpt meds ?norvasc  -bp controlled, monitor bp, would not start bp med at this time

## 2019-07-20 NOTE — PHYSICAL THERAPY INITIAL EVALUATION ADULT - DISCHARGE DISPOSITION, PT EVAL
rehabilitation facility/Patient will benefit from inpatient restorative rehab at this time to improve functional mobility and strength to allow patient to return to baseline functional status.

## 2019-07-20 NOTE — H&P ADULT - NSHPLABSRESULTS_GEN_ALL_CORE
(07-19 @ 20:15)                      12.5  10.70 )-----------( 276                 39.6    Neutrophils = 5.55 (51.9%)  Lymphocytes = 4.21 (39.3%)  Eosinophils = 0.11 (1.0%)  Basophils = 0.06 (0.6%)  Monocytes = 0.75 (7.0%)  Bands = --%    07-19    141  |  102  |  19  ----------------------------<  150<H>  4.3   |  25  |  0.85    Ca    10.2      19 Jul 2019 20:15  Phos  3.0     07-19  Mg     1.7     07-19    TPro  7.2  /  Alb  4.0  /  TBili  0.3  /  DBili  x   /  AST  16  /  ALT  12  /  AlkPhos  70  07-19    ( 19 Jul 2019 20:15 )   PT: 9.9 SEC;   INR: 0.87 ;       PTT:27.6 SEC    CT head and cervical: no acute pathology  CT hip: left Focal cortical disruption at the junction of left femur   neck-greater trochanter as described, concerning for incomplete fracture.   For confirmation, MRI is recommended.

## 2019-07-20 NOTE — PHYSICAL THERAPY INITIAL EVALUATION ADULT - PERTINENT HX OF CURRENT PROBLEM, REHAB EVAL
Patient is a 78 year old female admitted to [facility] on 7/20 s/p mechanical and syncopal falls now c/o hip and knee pain and difficulty ambulating. PMH: DMII, hypothyroidism, anxiety disorder. CT hip: Impression: Focal cortical disruption at the junction of left femur neck-greater trochanter as described, concerning for incomplete fracture. Per Ortho Consult: "No need to go to OR. Low suspicion for hip fracture. Contusion to hip and knee, with exacerbation of OA. Left LE WBAT."

## 2019-07-20 NOTE — H&P ADULT - ASSESSMENT
78y Female w/ hx of DMII, hypothyroidism, anxiety disorder presents to [facility] ED s/p mechanical and syncopal falls now c/o hip and knee pain and difficulty ambulating.

## 2019-07-20 NOTE — H&P ADULT - PROBLEM SELECTOR PLAN 4
- Hx of documented hypothyroidism however, not recalling taking synthroid at this time.  - Check TSH in the am. Restart thyroxine as needed.

## 2019-07-20 NOTE — H&P ADULT - NSICDXFAMILYHX_GEN_ALL_CORE_FT
FAMILY HISTORY:  Family history of diabetes mellitus  Family history of heart disease    Child  Still living? Yes, Estimated age: 41-50  Family history of diabetes mellitus, Age at diagnosis: Age Unknown

## 2019-07-20 NOTE — PROGRESS NOTE ADULT - SUBJECTIVE AND OBJECTIVE BOX
Tana Zapien MD  Pager [phone]    CHIEF COMPLAINT: Patient is a 78y old  female who presents with a chief complaint of Recurrent fall at home (20 Jul 2019 11:04)    SUBJECTIVE / OVERNIGHT EVENTS:  pt denies chest pain or sob, admitted for fall, denies syncope, admits to intermittent dizziness    MEDICATIONS  (STANDING):  busPIRone 10 milliGRAM(s) Oral three times a day  dextrose 5%. 1000 milliLiter(s) (50 mL/Hr) IV Continuous <Continuous>  dextrose 50% Injectable 12.5 Gram(s) IV Push once  dextrose 50% Injectable 25 Gram(s) IV Push once  dextrose 50% Injectable 25 Gram(s) IV Push once  insulin lispro (HumaLOG) corrective regimen sliding scale   SubCutaneous three times a day before meals  insulin lispro (HumaLOG) corrective regimen sliding scale   SubCutaneous at bedtime  venlafaxine XR. 225 milliGRAM(s) Oral daily    MEDICATIONS  (PRN):  dextrose 40% Gel 15 Gram(s) Oral once PRN Blood Glucose LESS THAN 70 milliGRAM(s)/deciliter  glucagon  Injectable 1 milliGRAM(s) IntraMuscular once PRN Glucose LESS THAN 70 milligrams/deciliter      VITALS:  T(F): 97.8 (07-20-19 @ 10:30), Max: 98.4 (07-19-19 @ 23:56)  HR: 83 (07-20-19 @ 10:30) (82 - 96)  BP: 130/44 (07-20-19 @ 10:30) (108/53 - 152/72)  RR: 16 (07-20-19 @ 10:30) (16 - 18)  SpO2: 97% (07-20-19 @ 10:30)      CAPILLARY BLOOD GLUCOSE    Output     I&O's Summary  T(F): 97.8 (07-20-19 @ 10:30), Max: 98.4 (07-19-19 @ 23:56)  HR: 83 (07-20-19 @ 10:30) (82 - 96)  BP: 130/44 (07-20-19 @ 10:30) (108/53 - 152/72)  RR: 16 (07-20-19 @ 10:30) (16 - 18)  SpO2: 97% (07-20-19 @ 10:30)    PHYSICAL EXAM:  GENERAL: NAD, well-developed  HEAD:  Atraumatic, Normocephalic  EYES: EOMI, PERRLA, conjunctiva and sclera clear  NECK: Supple, No JVD  CHEST/LUNG: Clear to auscultation bilaterally; No wheeze  HEART: Regular rate and rhythm; No murmurs, rubs, or gallops  ABDOMEN: Soft, Nontender, Nondistended; Bowel sounds present  EXTREMITIES:  2+ Peripheral Pulses, No clubbing, cyanosis, or edema, L hip full rom  PSYCH: AAOx3  NEUROLOGY: non-focal  SKIN: No rashes or lesions    LABS:              12.0                 143  | 28   | 13           8.81  >-----------< 256     ------------------------< 170                   37.8                 4.1  | 104  | 0.82                                         Ca 10.1  Mg 1.8   Ph x           TPro  7.2  /  Alb  4.0      TBili  0.3  /  DBili  x         AST  16  /  ALT  12            AlkPhos  70      INR: 0.99 ;    PT: 11.0 SEC;    PTT: 27.3 SEC<L>      MICROBIOLOGY:    RADIOLOGY & ADDITIONAL TESTS:    Imaging Personally Reviewed:  < from: Xray Knee 3 Views, Left (07.20.19 @ 02:39) >  No acute fracture or dislocation    < from: CT Hip No Cont, Left (07.19.19 @ 21:33) >    Impression: Focal cortical disruption at the junction of left femur   neck-greater trochanter as described, concerning for incomplete fracture.   For confirmation, MRI is recommended.    < from: CT Cervical Spine No Cont (07.19.19 @ 21:32) >    IMPRESSION:    HEAD CT: No acute intracranial hemorrhage, mass effect, or midline shift.   No acute displaced skull fracture.    CERVICAL SPINE CT: No acute cervical spine fracture or traumatic   vertebral subluxation.    [ ] Consultant(s) Notes Reviewed:  [x ] Care Discussed with Consultants/Other Providers: tele GAURAV Arizmendi, no MRI or OR plan per ortho, f/u cardio recs; psych consult to r/o polypharmacy Tana Zapien MD  Pager [phone]    CHIEF COMPLAINT: Patient is a 78y old  female who presents with a chief complaint of Recurrent fall at home (20 Jul 2019 11:04)    SUBJECTIVE / OVERNIGHT EVENTS:  pt denies chest pain or sob, admitted for fall, denies syncope, admits to intermittent dizziness    MEDICATIONS  (STANDING):  busPIRone 10 milliGRAM(s) Oral three times a day  dextrose 5%. 1000 milliLiter(s) (50 mL/Hr) IV Continuous <Continuous>  dextrose 50% Injectable 12.5 Gram(s) IV Push once  dextrose 50% Injectable 25 Gram(s) IV Push once  dextrose 50% Injectable 25 Gram(s) IV Push once  insulin lispro (HumaLOG) corrective regimen sliding scale   SubCutaneous three times a day before meals  insulin lispro (HumaLOG) corrective regimen sliding scale   SubCutaneous at bedtime  venlafaxine XR. 225 milliGRAM(s) Oral daily    MEDICATIONS  (PRN):  dextrose 40% Gel 15 Gram(s) Oral once PRN Blood Glucose LESS THAN 70 milliGRAM(s)/deciliter  glucagon  Injectable 1 milliGRAM(s) IntraMuscular once PRN Glucose LESS THAN 70 milligrams/deciliter      VITALS:  T(F): 97.8 (07-20-19 @ 10:30), Max: 98.4 (07-19-19 @ 23:56)  HR: 83 (07-20-19 @ 10:30) (82 - 96)  BP: 130/44 (07-20-19 @ 10:30) (108/53 - 152/72)  RR: 16 (07-20-19 @ 10:30) (16 - 18)  SpO2: 97% (07-20-19 @ 10:30)      CAPILLARY BLOOD GLUCOSE    Output     I&O's Summary  T(F): 97.8 (07-20-19 @ 10:30), Max: 98.4 (07-19-19 @ 23:56)  HR: 83 (07-20-19 @ 10:30) (82 - 96)  BP: 130/44 (07-20-19 @ 10:30) (108/53 - 152/72)  RR: 16 (07-20-19 @ 10:30) (16 - 18)  SpO2: 97% (07-20-19 @ 10:30)    PHYSICAL EXAM:  GENERAL: NAD, well-developed  HEAD:  Atraumatic, Normocephalic  EYES: EOMI, PERRLA, conjunctiva and sclera clear  NECK: Supple, No JVD  CHEST/LUNG: Clear to auscultation bilaterally; No wheeze  HEART: Regular rate and rhythm; No murmurs, rubs, or gallops  ABDOMEN: Soft, Nontender, Nondistended; Bowel sounds present  EXTREMITIES:  2+ Peripheral Pulses, No clubbing, cyanosis, or edema, L hip full rom  PSYCH: AAOx3  NEUROLOGY: non-focal  SKIN: No rashes or lesions    LABS:              12.0                 143  | 28   | 13           8.81  >-----------< 256     ------------------------< 170                   37.8                 4.1  | 104  | 0.82                                         Ca 10.1  Mg 1.8   Ph x           TPro  7.2  /  Alb  4.0      TBili  0.3  /  DBili  x         AST  16  /  ALT  12            AlkPhos  70      INR: 0.99 ;    PT: 11.0 SEC;    PTT: 27.3 SEC<L>      MICROBIOLOGY:    RADIOLOGY & ADDITIONAL TESTS:    Imaging Personally Reviewed:  < from: Xray Knee 3 Views, Left (07.20.19 @ 02:39) >  No acute fracture or dislocation    < from: CT Hip No Cont, Left (07.19.19 @ 21:33) >    Impression: Focal cortical disruption at the junction of left femur   neck-greater trochanter as described, concerning for incomplete fracture.   For confirmation, MRI is recommended.    < from: CT Cervical Spine No Cont (07.19.19 @ 21:32) >    IMPRESSION:    HEAD CT: No acute intracranial hemorrhage, mass effect, or midline shift.   No acute displaced skull fracture.    CERVICAL SPINE CT: No acute cervical spine fracture or traumatic   vertebral subluxation.    < from: Transthoracic Echocardiogram (07.20.19 @ 11:55) >  CONCLUSIONS:  normal LVEF 73%  1. Mitral annular calcification, otherwise normal mitral  valve.  2. Normal left ventricular internal dimensions and wall  thicknesses.  3. Normal left ventricular systolic function. No segmental  wall motion abnormalities.  4. Normal right ventricular size and function.        [ ] Consultant(s) Notes Reviewed:  [x ] Care Discussed with Consultants/Other Providers: tele ADLIENE Kelley, PT eval, no MRI or OR plan per ortho, f/u cardio recs; psych consult to r/o polypharmacy

## 2019-07-20 NOTE — PHYSICAL THERAPY INITIAL EVALUATION ADULT - PATIENT PROFILE REVIEW, REHAB EVAL
PT orders received: ambulate as tolerated. Consult with KIMBERLY TUCKER, pt may participate in PT evaluation./yes

## 2019-07-20 NOTE — H&P ADULT - PROBLEM SELECTOR PLAN 3
- Patient RCRI risk 3.9% composite CV risk to proceed with intermediate ortho surgery.  - Patient currently awaiting further syncope w/u. - Patient RCRI risk 3.9% composite CV risk to proceed with intermediate ortho surgery.  - Patient currently awaiting further syncope w/u.  - Will consult cardiology in the am for further pre-op clearance. - Patient RCRI risk 3.9% composite CV risk to proceed with intermediate ortho surgery.  - Patient currently awaiting further syncope w/u.  - Will consult cardiology in the am for further risk stratification prior to possible planned procedure.

## 2019-07-20 NOTE — CONSULT NOTE ADULT - SUBJECTIVE AND OBJECTIVE BOX
78y Female presents to [facility] ED s/p mechanical and syncopal falls now c/o hip and knee pain and difficulty ambulating.  She fell 3 days ago while rushing to go to the bathroom, does not remember falling, and came to on the floor, needed help from her son to get to the bathroom.  She was able to walk with antalgic gait after this incident, did not utilize assistive devices.  She then fell again the next day, reports mechanical fall tripping over feet, no LOC/HS.  She was also able to walk after this fall but with increased pain, still no assistive devices but getting support from son.  She feels unsteady on her feet.  She was brought to the ED due to increased pain and inabilty to ambulate.  She localizes worst pain to the L knee, lesser pain in the L hip/flank. Patient denies radiation of pain. Patient denies numbness/tingling/burning in the LLE. No other bone/joint complaints. Patient is a community ambulator at baseline without assistive devices. Patient at baseline has no issues w/ ADLs/IADLs.     PAST MEDICAL & SURGICAL HISTORY:  Noninfective gastroenteritis and colitis  IBS (irritable bowel syndrome)  Depression  Panic disorder  Hypothyroidism  Sleep apnea: Patient reports she does not use CPAP  Anxiety  DM (diabetes mellitus): type 2  HTN (hypertension)  History of cataract: Bilateral  S/P partial hysterectomy: 1997    MEDICATIONS  (STANDING):    MEDICATIONS  (PRN):    Allergies    No Known Allergies    Intolerances        T(C): 36.9 (07-19-19 @ 23:56), Max: 36.9 (07-19-19 @ 23:56)  HR: 96 (07-19-19 @ 23:56) (86 - 96)  BP: 152/72 (07-19-19 @ 23:56) (127/53 - 152/72)  RR: 18 (07-19-19 @ 23:56) (18 - 18)  SpO2: 96% (07-19-19 @ 23:56) (96% - 100%)  Wt(kg): --    PE   LLE:  Skin intact; mild soft tissue swelling about knee, ecchymosis over lateral lower leg  Compartments soft; +mild TTP about hip and knee. No TTP to leg/ankle/foot   Tolerates full ROM of hip/knee with pain at extremes of flexion  Able to SLR without pain/difficulty; +Neg Log Roll/Heel Strike  Motor intact GS/TA/FHL/EHL  SILT L2-S1  DP/PT pulses 2+    LLE/BUE:   No bony TTP; Good ROM w/o pain; Exam Unremarkable    Labs:                          12.5   10.70 )-----------( 276      ( 19 Jul 2019 20:15 )             39.6     07-19    141  |  102  |  19  ----------------------------<  150<H>  4.3   |  25  |  0.85    Ca    10.2      19 Jul 2019 20:15  Phos  3.0     07-19  Mg     1.7     07-19    TPro  7.2  /  Alb  4.0  /  TBili  0.3  /  DBili  x   /  AST  16  /  ALT  12  /  AlkPhos  70  07-19    PT/INR - ( 19 Jul 2019 20:15 )   PT: 9.9 SEC;   INR: 0.87          PTT - ( 19 Jul 2019 20:15 )  PTT:27.6 SEC    Imaging:    < from: CT Hip No Cont, Left (07.19.19 @ 21:33) >  Bones: Osteopenia. There are no displaced fractures. There is a minimal   cortical disruption in the medial margin of the left femur neck-greater   trochanter junction ([id number]). There is no trabecular disturbance.   Degenerative changes of the visualized lower lumbar spine and sacroiliac   joints.     < end of copied text >    < from: CT Head No Cont (07.19.19 @ 21:32) >    IMPRESSION:    HEAD CT: No acute intracranial hemorrhage, mass effect, or midline shift.   No acute displaced skull fracture.    CERVICAL SPINE CT: No acute cervical spine fracture or traumatic   vertebral subluxation.    < end of copied text >

## 2019-07-20 NOTE — H&P ADULT - HISTORY OF PRESENT ILLNESS
78y Female w/ hx of DMII, hypothyroidism, anxiety disorder presents to [facility] ED s/p mechanical and syncopal falls now c/o hip and knee pain and difficulty ambulating.  She fell 3 days ago while rushing to go to the bathroom, does not remember falling, and came to on the floor, needed help from her son to get to the bathroom.  She was able to walk with antalgic gait after this incident, did not utilize assistive devices.  She then fell again the next day, reports mechanical fall tripping over feet, no LOC/HS.  She was also able to walk after this fall but with increased pain, still no assistive devices but getting support from son.  She feels unsteady on her feet.  She was brought to the ED due to increased pain and inabilty to ambulate.  She localizes worst pain to the L knee, lesser pain in the L hip/flank. Patient denies radiation of pain. Patient denies numbness/tingling/burning in the LLE. No other bone/joint complaints.    At baseline, patient reported able to walk up one flight of stairs. Does notice increased PHAN with walking. Also reported intermittent LE swelling bilaterally up to midthigh level. Otherwise, no cardiac history. Recent stress test and echo in 02/2019 reveals no significant pathology. Tolerated partial hysterectomy in the distant past without complications. No reported allergy.

## 2019-07-20 NOTE — PHYSICAL THERAPY INITIAL EVALUATION ADULT - ADDITIONAL COMMENTS
Patient reports she lives with her 2 sons in a private house, with 13 steps to enter "and 21 inside". Patient reports she required assistance from her family with ADLs and ambulated independently prior to admission.    Patient was left semi-supine in bed as found, +bed alarm, all lines/tubes intact and call peters within reach, KIMBERLY philip

## 2019-07-20 NOTE — H&P ADULT - PROBLEM SELECTOR PLAN 1
- Patient presented with recurrent falls at home resulted in left hip fx.   - Ortho consulted. Recs appreciated: pain control, MRI  - Syncope w/u as illustrated below.  - PT ordered. - Patient presented with recurrent falls at home resulted in left hip fx.   - Ortho consulted. Recs appreciated: pain control, MRI  - Syncope w/u as illustrated below.

## 2019-07-20 NOTE — CONSULT NOTE ADULT - SUBJECTIVE AND OBJECTIVE BOX
Cardiovascular Disease Initial Evaluation    CHIEF COMPLAINT: Passing out    HISTORY OF PRESENT ILLNESS:  This is a 78 year old woman with NIDDM, hypothyroidism, and anxiety disorder who presented to [facility] ED on 7/19/2019 s/p mechanical and syncopal falls now c/o hip and knee pain and difficulty ambulating.  She fell 4 days ago while rushing to go to the bathroom, does not remember falling, and came to on the floor, needed help from her son to get to the bathroom.  She was able to walk with antalgic gait after this incident, did not utilize assistive devices.  She then fell again the next day, reports mechanical fall tripping over feet, no LOC/HS.  She was also able to walk after this fall but with increased pain, still no assistive devices but getting support from son.  She feels unsteady on her feet.    She denies chest pain or SOB.       Allergies  No Known Allergies  	    MEDICATIONS:        busPIRone 10 milliGRAM(s) Oral three times a day  venlafaxine XR. 225 milliGRAM(s) Oral daily      dextrose 40% Gel 15 Gram(s) Oral once PRN  dextrose 50% Injectable 12.5 Gram(s) IV Push once  dextrose 50% Injectable 25 Gram(s) IV Push once  dextrose 50% Injectable 25 Gram(s) IV Push once  glucagon  Injectable 1 milliGRAM(s) IntraMuscular once PRN  insulin lispro (HumaLOG) corrective regimen sliding scale   SubCutaneous three times a day before meals  insulin lispro (HumaLOG) corrective regimen sliding scale   SubCutaneous at bedtime    dextrose 5%. 1000 milliLiter(s) IV Continuous <Continuous>      PAST MEDICAL & SURGICAL HISTORY:  Noninfective gastroenteritis and colitis  IBS (irritable bowel syndrome)  Depression  Panic disorder  Hypothyroidism  Sleep apnea: Patient reports she does not use CPAP  Anxiety  DM (diabetes mellitus): type 2  HTN (hypertension)  History of cataract: Bilateral  S/P partial hysterectomy: 1997      FAMILY HISTORY:  Family history of diabetes mellitus (Child)  Family history of heart disease  Family history of diabetes mellitus      SOCIAL HISTORY:    Non-smoker        REVIEW OF SYSTEMS:  See HPI, otherwise complete 10 point review of systems negative      PHYSICAL EXAM:  T(C): 36.6 (07-20-19 @ 10:30), Max: 36.9 (07-19-19 @ 23:56)  HR: 83 (07-20-19 @ 10:30) (82 - 96)  BP: 130/44 (07-20-19 @ 10:30) (108/53 - 152/72)  RR: 16 (07-20-19 @ 10:30) (16 - 18)  SpO2: 97% (07-20-19 @ 10:30) (96% - 100%)  Wt(kg): --  I&O's Summary      Appearance: No Acute Distress	  HEENT:  Normal oral mucosa, PERRL, EOMI	  Cardiovascular: Normal S1 S2, No JVD, No murmurs/rubs/gallops  Respiratory: Lungs clear to auscultation bilaterally  Gastrointestinal:  Soft, Non-tender, + BS	  Skin: No rashes, No ecchymoses, No cyanosis	  Neurologic: Non-focal  Extremities: No clubbing, cyanosis or edema  Vascular: Peripheral pulses palpable 2+ bilaterally  Psychiatry: A & O x 3, Mood & affect appropriate    Laboratory Data:	 	    CBC Full  -  ( 20 Jul 2019 09:25 )  WBC Count : 8.81 K/uL  Hemoglobin : 12.0 g/dL  Hematocrit : 37.8 %  Platelet Count - Automated : 256 K/uL  Mean Cell Volume : 84.9 fL  Mean Cell Hemoglobin : 27.0 pg  Mean Cell Hemoglobin Concentration : 31.7 %  Auto Neutrophil # : x  Auto Lymphocyte # : x  Auto Monocyte # : x  Auto Eosinophil # : x  Auto Basophil # : x  Auto Neutrophil % : x  Auto Lymphocyte % : x  Auto Monocyte % : x  Auto Eosinophil % : x  Auto Basophil % : x    07-20    143  |  104  |  13  ----------------------------<  170<H>  4.1   |  28  |  0.82  07-19    141  |  102  |  19  ----------------------------<  150<H>  4.3   |  25  |  0.85    Ca    10.1      20 Jul 2019 09:25  Ca    10.2      19 Jul 2019 20:15  Phos  3.0     07-19  Mg     1.8     07-20  Mg     1.7     07-19    TPro  7.2  /  Alb  4.0  /  TBili  0.3  /  DBili  x   /  AST  16  /  ALT  12  /  AlkPhos  70  07-19    Interpretation of Telemetry: Sinus; no ectopy	    ECG:  	Sinus; non-specific t-wave changes.      Assessment: 78 year old woman with NIDDM, hypothyroidism, and anxiety disorder presents with syncope and abnormal EKG.     Plan of Care:    #Syncope-  Suspicion for polypharmacy.  EKG is sinus with non-specific t-wave changes.  No ectopy on telemetry thus far.  Echo already ordered by primary team (echo was unremarkable in 2/2019).    No plan for surgical intervention as per orthopedic team.     Will follow with you.      62 minutes spent on total encounter; more than 50% of the visit was spent counseling and/or coordinating care by the attending physician.   	  Tin Kapoor MD [facility]  Cardiovascular Diseases  [phone] Cardiovascular Disease Initial Evaluation    CHIEF COMPLAINT: Passing out    HISTORY OF PRESENT ILLNESS:  This is a 78 year old woman with NIDDM, hypothyroidism, and anxiety disorder who presented to [facility] ED on 7/19/2019 s/p mechanical and syncopal falls now c/o hip and knee pain and difficulty ambulating.  She fell 4 days ago while rushing to go to the bathroom, does not remember falling, and came to on the floor, needed help from her son to get to the bathroom.  She was able to walk with antalgic gait after this incident, did not utilize assistive devices.  She then fell again the next day, reports mechanical fall tripping over feet, no LOC/HS.  She was also able to walk after this fall but with increased pain, still no assistive devices but getting support from son.  She feels unsteady on her feet.    She denies chest pain or SOB.       Allergies  No Known Allergies  	    MEDICATIONS:        busPIRone 10 milliGRAM(s) Oral three times a day  venlafaxine XR. 225 milliGRAM(s) Oral daily      dextrose 40% Gel 15 Gram(s) Oral once PRN  dextrose 50% Injectable 12.5 Gram(s) IV Push once  dextrose 50% Injectable 25 Gram(s) IV Push once  dextrose 50% Injectable 25 Gram(s) IV Push once  glucagon  Injectable 1 milliGRAM(s) IntraMuscular once PRN  insulin lispro (HumaLOG) corrective regimen sliding scale   SubCutaneous three times a day before meals  insulin lispro (HumaLOG) corrective regimen sliding scale   SubCutaneous at bedtime    dextrose 5%. 1000 milliLiter(s) IV Continuous <Continuous>      PAST MEDICAL & SURGICAL HISTORY:  Noninfective gastroenteritis and colitis  IBS (irritable bowel syndrome)  Depression  Panic disorder  Hypothyroidism  Sleep apnea: Patient reports she does not use CPAP  Anxiety  DM (diabetes mellitus): type 2  HTN (hypertension)  History of cataract: Bilateral  S/P partial hysterectomy: 1997      FAMILY HISTORY:  Family history of diabetes mellitus (Child)  Family history of heart disease  Family history of diabetes mellitus      SOCIAL HISTORY:    Non-smoker        REVIEW OF SYSTEMS:  See HPI, otherwise complete 10 point review of systems negative      PHYSICAL EXAM:  T(C): 36.6 (07-20-19 @ 10:30), Max: 36.9 (07-19-19 @ 23:56)  HR: 83 (07-20-19 @ 10:30) (82 - 96)  BP: 130/44 (07-20-19 @ 10:30) (108/53 - 152/72)  RR: 16 (07-20-19 @ 10:30) (16 - 18)  SpO2: 97% (07-20-19 @ 10:30) (96% - 100%)  Wt(kg): --  I&O's Summary      Appearance: No Acute Distress	  HEENT:  Normal oral mucosa, PERRL, EOMI	  Cardiovascular: Normal S1 S2, No JVD, No murmurs/rubs/gallops  Respiratory: Lungs clear to auscultation bilaterally  Gastrointestinal:  Soft, Non-tender, + BS	  Skin: No rashes, No ecchymoses, No cyanosis	  Neurologic: Non-focal  Extremities: No clubbing, cyanosis or edema  Vascular: Peripheral pulses palpable 2+ bilaterally  Psychiatry: A & O x 3, Mood & affect appropriate    Laboratory Data:	 	    CBC Full  -  ( 20 Jul 2019 09:25 )  WBC Count : 8.81 K/uL  Hemoglobin : 12.0 g/dL  Hematocrit : 37.8 %  Platelet Count - Automated : 256 K/uL  Mean Cell Volume : 84.9 fL  Mean Cell Hemoglobin : 27.0 pg  Mean Cell Hemoglobin Concentration : 31.7 %  Auto Neutrophil # : x  Auto Lymphocyte # : x  Auto Monocyte # : x  Auto Eosinophil # : x  Auto Basophil # : x  Auto Neutrophil % : x  Auto Lymphocyte % : x  Auto Monocyte % : x  Auto Eosinophil % : x  Auto Basophil % : x    07-20    143  |  104  |  13  ----------------------------<  170<H>  4.1   |  28  |  0.82  07-19    141  |  102  |  19  ----------------------------<  150<H>  4.3   |  25  |  0.85    Ca    10.1      20 Jul 2019 09:25  Ca    10.2      19 Jul 2019 20:15  Phos  3.0     07-19  Mg     1.8     07-20  Mg     1.7     07-19    TPro  7.2  /  Alb  4.0  /  TBili  0.3  /  DBili  x   /  AST  16  /  ALT  12  /  AlkPhos  70  07-19    Interpretation of Telemetry: Sinus; no ectopy	    ECG:  	Sinus; non-specific t-wave changes.      Assessment: 78 year old woman with NIDDM, hypothyroidism, and anxiety disorder presents with syncope and abnormal EKG.     Plan of Care:    #Syncope-  Suspicion for polypharmacy.  Check orthostatics.  EKG is sinus with non-specific t-wave changes.  No ectopy on telemetry thus far.  Echo already ordered by primary team (echo was unremarkable in 2/2019).    No plan for surgical intervention as per orthopedic team.     Will follow with you.      62 minutes spent on total encounter; more than 50% of the visit was spent counseling and/or coordinating care by the attending physician.   	  Tin Kapoor MD [facility]  Cardiovascular Diseases  [phone]

## 2019-07-21 LAB
ANION GAP SERPL CALC-SCNC: 12 MMO/L — SIGNIFICANT CHANGE UP (ref 7–14)
BUN SERPL-MCNC: 11 MG/DL — SIGNIFICANT CHANGE UP (ref 7–23)
CALCIUM SERPL-MCNC: 9.8 MG/DL — SIGNIFICANT CHANGE UP (ref 8.4–10.5)
CHLORIDE SERPL-SCNC: 103 MMOL/L — SIGNIFICANT CHANGE UP (ref 98–107)
CO2 SERPL-SCNC: 28 MMOL/L — SIGNIFICANT CHANGE UP (ref 22–31)
CREAT SERPL-MCNC: 0.83 MG/DL — SIGNIFICANT CHANGE UP (ref 0.5–1.3)
GLUCOSE SERPL-MCNC: 145 MG/DL — HIGH (ref 70–99)
HBA1C BLD-MCNC: 7.6 % — HIGH (ref 4–5.6)
HCT VFR BLD CALC: 40.1 % — SIGNIFICANT CHANGE UP (ref 34.5–45)
HGB BLD-MCNC: 12.5 G/DL — SIGNIFICANT CHANGE UP (ref 11.5–15.5)
MAGNESIUM SERPL-MCNC: 1.8 MG/DL — SIGNIFICANT CHANGE UP (ref 1.6–2.6)
MCHC RBC-ENTMCNC: 27.4 PG — SIGNIFICANT CHANGE UP (ref 27–34)
MCHC RBC-ENTMCNC: 31.2 % — LOW (ref 32–36)
MCV RBC AUTO: 87.9 FL — SIGNIFICANT CHANGE UP (ref 80–100)
NRBC # FLD: 0 K/UL — SIGNIFICANT CHANGE UP (ref 0–0)
PLATELET # BLD AUTO: 267 K/UL — SIGNIFICANT CHANGE UP (ref 150–400)
PMV BLD: 9.8 FL — SIGNIFICANT CHANGE UP (ref 7–13)
POTASSIUM SERPL-MCNC: 4.7 MMOL/L — SIGNIFICANT CHANGE UP (ref 3.5–5.3)
POTASSIUM SERPL-SCNC: 4.7 MMOL/L — SIGNIFICANT CHANGE UP (ref 3.5–5.3)
RBC # BLD: 4.56 M/UL — SIGNIFICANT CHANGE UP (ref 3.8–5.2)
RBC # FLD: 13.8 % — SIGNIFICANT CHANGE UP (ref 10.3–14.5)
SODIUM SERPL-SCNC: 143 MMOL/L — SIGNIFICANT CHANGE UP (ref 135–145)
T4 FREE SERPL-MCNC: 1.04 NG/DL — SIGNIFICANT CHANGE UP (ref 0.9–1.8)
TSH SERPL-MCNC: 2.58 UIU/ML — SIGNIFICANT CHANGE UP (ref 0.27–4.2)
WBC # BLD: 9.33 K/UL — SIGNIFICANT CHANGE UP (ref 3.8–10.5)
WBC # FLD AUTO: 9.33 K/UL — SIGNIFICANT CHANGE UP (ref 3.8–10.5)

## 2019-07-21 PROCEDURE — 99233 SBSQ HOSP IP/OBS HIGH 50: CPT

## 2019-07-21 RX ORDER — LEVOTHYROXINE SODIUM 125 MCG
50 TABLET ORAL DAILY
Refills: 0 | Status: DISCONTINUED | OUTPATIENT
Start: 2019-07-21 | End: 2019-07-25

## 2019-07-21 RX ADMIN — Medication 10 MILLIGRAM(S): at 05:28

## 2019-07-21 RX ADMIN — Medication 10 MILLIGRAM(S): at 21:46

## 2019-07-21 RX ADMIN — Medication 1: at 12:46

## 2019-07-21 RX ADMIN — Medication 50 MICROGRAM(S): at 12:46

## 2019-07-21 RX ADMIN — Medication 10 MILLIGRAM(S): at 12:46

## 2019-07-21 RX ADMIN — Medication 225 MILLIGRAM(S): at 12:46

## 2019-07-21 NOTE — CHART NOTE - NSCHARTNOTEFT_GEN_A_CORE
Psych c/s called for medication management as syncope possibly secondary to polypharmacy from Buspar and Effexor considering cardiac workup negative thus far. Will f/u recommendations.

## 2019-07-21 NOTE — PROGRESS NOTE ADULT - PROBLEM SELECTOR PLAN 2
- pt denies syncope, but admits to dizziness  -cardiology consult appreciated, suspicion for polypharmacy  negative orthostatics, psych consult to review psych meds   -EKG sinus with nonspecific T wave changes  -Echo 7/20 normal LVEF 73%, MAC  Also unremarkable echo in 2/2019  -no further cardiology workup per cardiology

## 2019-07-21 NOTE — PROGRESS NOTE ADULT - SUBJECTIVE AND OBJECTIVE BOX
Tana Zapien MD  Pager [phone]    CHIEF COMPLAINT: Patient is a 78y old  female who presents with a chief complaint of Recurrent fall at home (21 Jul 2019 11:17)      SUBJECTIVE / OVERNIGHT EVENTS:  pt states she's on synthroid at home, seen by PT, recommend rehab, no chest pain/sob    MEDICATIONS  (STANDING):  busPIRone 10 milliGRAM(s) Oral three times a day  dextrose 5%. 1000 milliLiter(s) (50 mL/Hr) IV Continuous <Continuous>  dextrose 50% Injectable 12.5 Gram(s) IV Push once  dextrose 50% Injectable 25 Gram(s) IV Push once  dextrose 50% Injectable 25 Gram(s) IV Push once  insulin lispro (HumaLOG) corrective regimen sliding scale   SubCutaneous three times a day before meals  insulin lispro (HumaLOG) corrective regimen sliding scale   SubCutaneous at bedtime  levothyroxine 50 MICROGram(s) Oral daily  venlafaxine XR. 225 milliGRAM(s) Oral daily    MEDICATIONS  (PRN):  dextrose 40% Gel 15 Gram(s) Oral once PRN Blood Glucose LESS THAN 70 milliGRAM(s)/deciliter  glucagon  Injectable 1 milliGRAM(s) IntraMuscular once PRN Glucose LESS THAN 70 milligrams/deciliter      VITALS:  T(F): 98.3 (07-21-19 @ 10:12), Max: 98.3 (07-21-19 @ 10:12)  HR: 87 (07-21-19 @ 10:12) (78 - 87)  BP: 130/69 (07-21-19 @ 10:12) (126/74 - 130/69)  RR: 17 (07-21-19 @ 10:12) (16 - 17)  SpO2: 97% (07-21-19 @ 10:12)      CAPILLARY BLOOD GLUCOSE    Output     I&O's Summary  T(F): 98.3 (07-21-19 @ 10:12), Max: 98.3 (07-21-19 @ 10:12)  HR: 87 (07-21-19 @ 10:12) (78 - 87)  BP: 130/69 (07-21-19 @ 10:12) (126/74 - 130/69)  RR: 17 (07-21-19 @ 10:12) (16 - 17)  SpO2: 97% (07-21-19 @ 10:12)    PHYSICAL EXAM:  GENERAL: NAD, well-developed  HEAD:  Atraumatic, Normocephalic  EYES: EOMI, PERRLA, conjunctiva and sclera clear  NECK: Supple, No JVD  CHEST/LUNG: Clear to auscultation bilaterally; No wheeze  HEART: Regular rate and rhythm; No murmurs, rubs, or gallops  ABDOMEN: Soft, Nontender, Nondistended; Bowel sounds present  EXTREMITIES:  2+ Peripheral Pulses, No clubbing, cyanosis, or edema, L hip full rom  PSYCH: AAOx3  NEUROLOGY: non-focal  SKIN: No rashes or lesions    LABS:              12.5                 143  | 28   | 11           9.33  >-----------< 267     ------------------------< 145                   40.1                 4.7  | 103  | 0.83                                         Ca 9.8   Mg 1.8   Ph x           TPro  7.2  /  Alb  4.0      TBili  0.3  /  DBili  x         AST  16  /  ALT  12            AlkPhos  70      INR: 0.99 ;    PT: 11.0 SEC;    PTT: 27.3 SEC<L>        MICROBIOLOGY:    RADIOLOGY & ADDITIONAL TESTS:    Imaging Personally Reviewed:    [ ] Consultant(s) Notes Reviewed:  [x ] Care Discussed with Consultants/Other Providers: lata Kelley PT recommends rehab, no further cardiac workup per cardiology, psychiatry consult

## 2019-07-21 NOTE — PROGRESS NOTE ADULT - SUBJECTIVE AND OBJECTIVE BOX
Cardiovascular Disease Progress Note    Overnight events: No acute events overnight.  Patient denies chest pain or SOB.  Otherwise review of systems negative    Objective Findings:  T(C): 36.8 (19 @ 10:12), Max: 36.8 (19 @ 10:12)  HR: 87 (19 @ 10:12) (78 - 87)  BP: 130/69 (19 @ 10:12) (126/74 - 130/69)  RR: 17 (19 @ 10:12) (16 - 17)  SpO2: 97% (19 @ 10:12) (97% - 98%)  Wt(kg): --  Daily     Daily Weight in k.2 (2019 02:46)      Physical Exam:  Gen: NAD  HEENT: EOMI  CV: RRR, normal S1 + S2, no m/r/g  Lungs: CTAB  Abd: soft, non-tender  Ext: No edema    Telemetry: Sinus; no ectopy    Laboratory Data:                        12.5   9.33  )-----------( 267      ( 2019 06:40 )             40.1         143  |  103  |  11  ----------------------------<  145<H>  4.7   |  28  |  0.83    Ca    9.8      2019 06:40  Phos  3.0       Mg     1.8         TPro  7.2  /  Alb  4.0  /  TBili  0.3  /  DBili  x   /  AST  16  /  ALT  12  /  AlkPhos  70      PT/INR - ( 2019 09:25 )   PT: 11.0 SEC;   INR: 0.99          PTT - ( 2019 09:25 )  PTT:27.3 SEC          Inpatient Medications:  MEDICATIONS  (STANDING):  busPIRone 10 milliGRAM(s) Oral three times a day  dextrose 5%. 1000 milliLiter(s) (50 mL/Hr) IV Continuous <Continuous>  dextrose 50% Injectable 12.5 Gram(s) IV Push once  dextrose 50% Injectable 25 Gram(s) IV Push once  dextrose 50% Injectable 25 Gram(s) IV Push once  insulin lispro (HumaLOG) corrective regimen sliding scale   SubCutaneous three times a day before meals  insulin lispro (HumaLOG) corrective regimen sliding scale   SubCutaneous at bedtime  levothyroxine 50 MICROGram(s) Oral daily  venlafaxine XR. 225 milliGRAM(s) Oral daily      Assessment: 78 year old woman with NIDDM, hypothyroidism, and anxiety disorder presents with syncope and abnormal EKG.     Plan of Care:    #Syncope-  Suspicion for polypharmacy.  Orthostatics negative.   EKG is sinus with non-specific t-wave changes.  No ectopy on telemetry thus far.  Echo unremarkable.    No further inpatient cardiac work up warranted at this time.     Over 25 minutes spent on total encounter; more than 50% of the visit was spent counseling and/or coordinating care by the attending physician.      Tin Kapoor MD [facility]  Cardiovascular Disease  [phone]

## 2019-07-22 DIAGNOSIS — F32.9 MAJOR DEPRESSIVE DISORDER, SINGLE EPISODE, UNSPECIFIED: ICD-10-CM

## 2019-07-22 LAB
ANION GAP SERPL CALC-SCNC: 14 MMO/L — SIGNIFICANT CHANGE UP (ref 7–14)
BUN SERPL-MCNC: 13 MG/DL — SIGNIFICANT CHANGE UP (ref 7–23)
CALCIUM SERPL-MCNC: 9.8 MG/DL — SIGNIFICANT CHANGE UP (ref 8.4–10.5)
CHLORIDE SERPL-SCNC: 101 MMOL/L — SIGNIFICANT CHANGE UP (ref 98–107)
CO2 SERPL-SCNC: 25 MMOL/L — SIGNIFICANT CHANGE UP (ref 22–31)
CREAT SERPL-MCNC: 0.89 MG/DL — SIGNIFICANT CHANGE UP (ref 0.5–1.3)
GLUCOSE SERPL-MCNC: 146 MG/DL — HIGH (ref 70–99)
HCT VFR BLD CALC: 40.2 % — SIGNIFICANT CHANGE UP (ref 34.5–45)
HGB BLD-MCNC: 12.5 G/DL — SIGNIFICANT CHANGE UP (ref 11.5–15.5)
MAGNESIUM SERPL-MCNC: 1.9 MG/DL — SIGNIFICANT CHANGE UP (ref 1.6–2.6)
MCHC RBC-ENTMCNC: 26.6 PG — LOW (ref 27–34)
MCHC RBC-ENTMCNC: 31.1 % — LOW (ref 32–36)
MCV RBC AUTO: 85.5 FL — SIGNIFICANT CHANGE UP (ref 80–100)
NRBC # FLD: 0.02 K/UL — SIGNIFICANT CHANGE UP (ref 0–0)
PLATELET # BLD AUTO: 271 K/UL — SIGNIFICANT CHANGE UP (ref 150–400)
PMV BLD: 10 FL — SIGNIFICANT CHANGE UP (ref 7–13)
POTASSIUM SERPL-MCNC: 4.1 MMOL/L — SIGNIFICANT CHANGE UP (ref 3.5–5.3)
POTASSIUM SERPL-SCNC: 4.1 MMOL/L — SIGNIFICANT CHANGE UP (ref 3.5–5.3)
RBC # BLD: 4.7 M/UL — SIGNIFICANT CHANGE UP (ref 3.8–5.2)
RBC # FLD: 13.6 % — SIGNIFICANT CHANGE UP (ref 10.3–14.5)
SODIUM SERPL-SCNC: 140 MMOL/L — SIGNIFICANT CHANGE UP (ref 135–145)
WBC # BLD: 9.42 K/UL — SIGNIFICANT CHANGE UP (ref 3.8–10.5)
WBC # FLD AUTO: 9.42 K/UL — SIGNIFICANT CHANGE UP (ref 3.8–10.5)

## 2019-07-22 PROCEDURE — 90792 PSYCH DIAG EVAL W/MED SRVCS: CPT

## 2019-07-22 PROCEDURE — 99233 SBSQ HOSP IP/OBS HIGH 50: CPT

## 2019-07-22 RX ADMIN — Medication 10 MILLIGRAM(S): at 21:48

## 2019-07-22 RX ADMIN — Medication 10 MILLIGRAM(S): at 13:43

## 2019-07-22 RX ADMIN — Medication 50 MICROGRAM(S): at 06:19

## 2019-07-22 RX ADMIN — Medication 10 MILLIGRAM(S): at 06:19

## 2019-07-22 RX ADMIN — Medication 1: at 13:43

## 2019-07-22 RX ADMIN — Medication 225 MILLIGRAM(S): at 12:37

## 2019-07-22 NOTE — BEHAVIORAL HEALTH ASSESSMENT NOTE - SUMMARY
77yo female with a pph of depression and anxiety with a pmh of hypothyroidism and DM2 presented to [facility] ED 2 days ago for a syncopal fall 5 days ago with resultant hip/knee pain. Patient reports falling 3 times in the past month, however, the most recent was the most severe and required hospitalization after attempting to heal a few days at home. Psychiatry was consulted to assess her syncope and see if it was related to the pharmacological management of her conditions.     Patient endorses symptoms of MDD and does not fit criteria for Delirium or Dementia. We do not think that her medications is causing her syncopal epesoides.     Plan is to discharge patient and have her follow up with outpatient psychiatrist to make medication changes to better manage her depression. 79yo female with a pph of depression and anxiety with a pmh of hypothyroidism and DM2 presented to [facility] ED 2 days ago for a syncopal fall 5 days ago with resultant hip/knee pain. Patient reports falling 3 times in the past month, however, the most recent was the most severe and required hospitalization after attempting to heal a few days at home. Psychiatry was consulted to assess her syncope and see if it was related to the pharmacological management of her conditions.     Patient endorses symptoms of MDD and does not fit criteria for Delirium or Dementia. We do not think that her medications is causing her syncopal episodes    Plan is to discharge patient and have her follow up with outpatient psychiatrist to make medication changes to better manage her depression. 79yo female with a pph of depression and anxiety with a pmh of hypothyroidism and DM2 presented to [facility] ED 2 days ago for a syncopal fall 5 days ago with resultant hip/knee pain. Patient reports falling 3 times in the past month, however, the most recent was the most severe and required hospitalization after attempting to heal a few days at home. Psychiatry was consulted to assess her syncope and see if it was related to the pharmacological management of her conditions.     Patient endorses symptoms of MDD, is currently in treatment for this, denies SI/I/P. Does not fit criteria for Delirium or Dementia at this good. Current medication regimen unlikely to contribute to her syncopal episodes as she has been on these meds for many years. Would continue for now, will collaborate with outpatient psychiatrist.     Plan  - no change in meds at this time  - upon discharge patient should follow up with outpatient psychiatrist to make medication changes to better manage her depression.  - no need for inpt psych admission at this time

## 2019-07-22 NOTE — BEHAVIORAL HEALTH ASSESSMENT NOTE - CASE SUMMARY
In summary patient is 78 year old female with PPH of depresison, now p/w syncopal episodes. Seen in [facility] outpatient clinic, no recent med changes, patient has tolerated regimen for years without issue. Psych regimen unlikely to contribute at this time, effexor is not known to increase fall risk though major risk at this dose would be HTN. Given continued depression features would continue, should f/u with outpatient psych, message sent to outpatient MD. OK to d/c to home no si/i/p or hi/i/p.

## 2019-07-22 NOTE — PROGRESS NOTE ADULT - SUBJECTIVE AND OBJECTIVE BOX
Cardiovascular Disease Progress Note  Covering for Dr. Nieto    Overnight events: No acute events overnight. Ms. Yu denies chest pain or SOB.    Otherwise review of systems negative    Objective Findings:  T(C): 36.3 (19 @ 21:45), Max: 36.8 (19 @ 10:12)  HR: 84 (19 @ 21:45) (84 - 88)  BP: 144/81 (19 @ 21:45) (130/69 - 150/80)  RR: 18 (19 @ 21:45) (17 - 18)  SpO2: 97% (19 @ 21:45) (97% - 98%)  Wt(kg): --  Daily     Daily Weight in k (2019 07:24)      Physical Exam:  Gen: NAD  HEENT: EOMI  CV: RRR, normal S1 + S2, no m/r/g  Lungs: CTAB  Abd: soft, non-tender  Ext: No edema    Telemetry: Sinus; no ectopy    Laboratory Data:                        12.5   9.42  )-----------( 271      ( 2019 05:30 )             40.2     07-    143  |  103  |  11  ----------------------------<  145<H>  4.7   |  28  |  0.83    Ca    9.8      2019 06:40  Mg     1.8     -      PT/INR - ( 2019 09:25 )   PT: 11.0 SEC;   INR: 0.99          PTT - ( 2019 09:25 )  PTT:27.3 SEC          Inpatient Medications:  MEDICATIONS  (STANDING):  busPIRone 10 milliGRAM(s) Oral three times a day  dextrose 5%. 1000 milliLiter(s) (50 mL/Hr) IV Continuous <Continuous>  dextrose 50% Injectable 12.5 Gram(s) IV Push once  dextrose 50% Injectable 25 Gram(s) IV Push once  dextrose 50% Injectable 25 Gram(s) IV Push once  insulin lispro (HumaLOG) corrective regimen sliding scale   SubCutaneous three times a day before meals  insulin lispro (HumaLOG) corrective regimen sliding scale   SubCutaneous at bedtime  levothyroxine 50 MICROGram(s) Oral daily  venlafaxine XR. 225 milliGRAM(s) Oral daily      Assessment: 78 year old woman with NIDDM, hypothyroidism, and anxiety disorder presents with syncope and abnormal EKG.     Plan of Care:    #Syncope-  Orthostatics negative.   EKG is sinus with non-specific t-wave changes.  No ectopy on telemetry thus far.  Echo unremarkable.      Suspicion for polypharmacy.    Over 25 minutes spent on total encounter; more than 50% of the visit was spent counseling and/or coordinating care by the attending physician.      Tin Kapoor MD [facility]  Cardiovascular Disease  [phone]

## 2019-07-22 NOTE — BEHAVIORAL HEALTH ASSESSMENT NOTE - RISK ASSESSMENT
Risk factors include longstanding depression, chronic medical issues, protective factors include lack of current or prior SA, future orientation, dependant (adult) child, help seeking behavior, lack of hopelessness.

## 2019-07-22 NOTE — PROGRESS NOTE ADULT - SUBJECTIVE AND OBJECTIVE BOX
Patient is a 78y old  Female who presents with a chief complaint of Recurrent fall at home (22 Jul 2019 07:28)      SUBJECTIVE / OVERNIGHT EVENTS: Pt seen and examined at 11:50am,  no overnight events, tele with sinus in the 79s, pt has some low back pain and left knee pain, otherwise no other new issues.    MEDICATIONS  (STANDING):  busPIRone 10 milliGRAM(s) Oral three times a day  dextrose 5%. 1000 milliLiter(s) (50 mL/Hr) IV Continuous <Continuous>  dextrose 50% Injectable 12.5 Gram(s) IV Push once  dextrose 50% Injectable 25 Gram(s) IV Push once  dextrose 50% Injectable 25 Gram(s) IV Push once  insulin lispro (HumaLOG) corrective regimen sliding scale   SubCutaneous three times a day before meals  insulin lispro (HumaLOG) corrective regimen sliding scale   SubCutaneous at bedtime  levothyroxine 50 MICROGram(s) Oral daily  venlafaxine XR. 225 milliGRAM(s) Oral daily    MEDICATIONS  (PRN):  dextrose 40% Gel 15 Gram(s) Oral once PRN Blood Glucose LESS THAN 70 milliGRAM(s)/deciliter  glucagon  Injectable 1 milliGRAM(s) IntraMuscular once PRN Glucose LESS THAN 70 milligrams/deciliter      Vital Signs Last 24 Hrs  T(C): 36.3 (21 Jul 2019 21:45), Max: 36.6 (21 Jul 2019 18:18)  T(F): 97.4 (21 Jul 2019 21:45), Max: 97.9 (21 Jul 2019 18:18)  HR: 84 (21 Jul 2019 21:45) (84 - 88)  BP: 144/81 (21 Jul 2019 21:45) (144/81 - 150/80)  BP(mean): --  RR: 18 (21 Jul 2019 21:45) (18 - 18)  SpO2: 97% (21 Jul 2019 21:45) (97% - 98%)  CAPILLARY BLOOD GLUCOSE      POCT Blood Glucose.: 176 mg/dL (22 Jul 2019 12:54)  POCT Blood Glucose.: 146 mg/dL (22 Jul 2019 08:44)  POCT Blood Glucose.: 163 mg/dL (21 Jul 2019 22:07)  POCT Blood Glucose.: 144 mg/dL (21 Jul 2019 17:23)    I&O's Summary      PHYSICAL EXAM:  GENERAL: NAD, well-developed  CHEST/LUNG: Clear to auscultation bilaterally; No wheeze  HEART: Regular rate and rhythm; No murmurs  ABDOMEN: Soft, Nontender, Nondistended  EXTREMITIES: no LE edema, left paraspinal minimal tenderness+  PSYCH: Calm  NEUROLOGY: AAOx3  SKIN: No rashes or lesions    LABS:                        12.5   9.42  )-----------( 271      ( 22 Jul 2019 05:30 )             40.2     07-22    140  |  101  |  13  ----------------------------<  146<H>  4.1   |  25  |  0.89    Ca    9.8      22 Jul 2019 05:30  Mg     1.9     07-22                RADIOLOGY & ADDITIONAL TESTS:    Imaging Personally Reviewed:    Consultant(s) Notes Reviewed:      Care Discussed with Consultants/Other Providers:

## 2019-07-22 NOTE — BEHAVIORAL HEALTH ASSESSMENT NOTE - NSBHCHARTREVIEWVS_PSY_A_CORE FT
Vital Signs Last 24 Hrs  T(C): 36.8 (22 Jul 2019 14:49), Max: 36.8 (22 Jul 2019 14:49)  T(F): 98.2 (22 Jul 2019 14:49), Max: 98.2 (22 Jul 2019 14:49)  HR: 78 (22 Jul 2019 14:49) (78 - 88)  BP: 145/78 (22 Jul 2019 14:49) (144/81 - 150/80)  BP(mean): --  RR: 18 (22 Jul 2019 14:49) (18 - 18)  SpO2: 100% (22 Jul 2019 14:49) (97% - 100%)

## 2019-07-22 NOTE — BEHAVIORAL HEALTH ASSESSMENT NOTE - NSBHCHARTREVIEWLAB_PSY_A_CORE FT
07-22    140  |  101  |  13  ----------------------------<  146<H>  4.1   |  25  |  0.89    Ca    9.8      22 Jul 2019 05:30  Mg     1.9     07-22                            12.5   9.42  )-----------( 271      ( 22 Jul 2019 05:30 )             40.2

## 2019-07-22 NOTE — BEHAVIORAL HEALTH ASSESSMENT NOTE - HPI (INCLUDE ILLNESS QUALITY, SEVERITY, DURATION, TIMING, CONTEXT, MODIFYING FACTORS, ASSOCIATED SIGNS AND SYMPTOMS)
77yo female with a pph of depression and anxiety with a pmh of hypothyroidism and DM2 presented to [facility] ED 2 days ago for a syncopal fall 5 days ago with resultant hip/knee pain. Patient reports falling 3 times in the past month, however, the most recent was the most severe and required hospitalization after attempting to heal a few days at home. Psychiatry was consulted to assess if her syncope was related to the pharmacological management of her conditions.       Patient states that her anxiety has been well controlled on buspirone and that she has been depressed for at least 5 years despite venlafaxine therapy. She has had difficulty sleeping, staying awake until 6am most nights and sleeping until 1/2pm. patient reports poor concentration, feelings of guilt, lack of energy, and loss of enjoyment from activities. She denies any feelings of grandiosity, irritability, restlessness, pressured speech. Patient also denies any hallucinations or delusions. Patient scored 25 on MMSE 77yo female with a pph of depression and anxiety with a pmh of hypothyroidism and DM2 presented to [facility] ED 2 days ago for a syncopal fall that occurred 5 days ago with resultant hip/knee pain. Patient reports falling 3 times in the past month, however, the most recent was the most severe and required hospitalization after attempting to heal a few days at home. Psychiatry was consulted to assess if her syncope was related to the pharmacological management of her conditions.       Patient states that her anxiety has been well controlled on buspirone and that she has been depressed for at least 5 years despite venlafaxine therapy. She has had difficulty sleeping, staying awake until 6am most nights and sleeping until 1/2pm. patient reports poor concentration, feelings of guilt, lack of energy, and loss of enjoyment from activities. She denies any feelings of grandiosity, irritability, restlessness, pressured speech. Patient also denies any hallucinations or delusions. Patient scored 25 on MMSE 77yo female with a pph of depression and anxiety with a pmh of hypothyroidism and DM2 presented to [facility] ED 2 days ago for a syncopal fall that occurred 5 days ago with resultant hip/knee pain. Patient reports falling 3 times in the past month, however, the most recent was the most severe and required hospitalization after attempting to heal a few days at home. Psychiatry was consulted to assess if her syncope was related to the pharmacological management of her conditions.       Patient states that her anxiety has been well controlled on buspirone and that she has been depressed for at least 5 years despite venlafaxine therapy. She has had difficulty sleeping, staying awake until 6am most nights and sleeping until 1/2pm. patient reports poor concentration, loss of appetite, feelings of guilt, lack of energy, and loss of enjoyment from activities. She denies any feelings of grandiosity, irritability, restlessness, pressured speech. Patient also denies any hallucinations or delusions. Patient denies any suicidal or homicidal ideation. Patient scored 25 on MMSE 79yo female with a PPH of depression and anxiety with a PMH of hypothyroidism and DM2 presented to [facility] ED 2 days ago for a syncopal fall that occurred 5 days ago with resultant hip/knee pain. Patient reports falling 3 times in the past month, however, the most recent was the most severe and required hospitalization after attempting to heal a few days at home. Psychiatry was consulted to assess if her syncope was related to the pharmacological management of her psychiatric conditions.     Patient appears to be good historian, AOx3, scores 25/30 on MMSE. Patient states that her anxiety has been well controlled on buspirone and that she has been depressed for at least 5 years despite venlafaxine therapy. She has had difficulty sleeping, staying awake until 6am most nights and sleeping until 1/2pm. patient reports poor concentration, loss of appetite, feelings of guilt, lack of energy, and loss of enjoyment from activities. She denies any feelings of grandiosity, irritability, restlessness, pressured speech. Patient also denies any hallucinations or delusions. Patient denies any suicidal or homicidal ideation. 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

## 2019-07-23 DIAGNOSIS — Z71.89 OTHER SPECIFIED COUNSELING: ICD-10-CM

## 2019-07-23 LAB
ANION GAP SERPL CALC-SCNC: 15 MMO/L — HIGH (ref 7–14)
BASOPHILS # BLD AUTO: 0.06 K/UL — SIGNIFICANT CHANGE UP (ref 0–0.2)
BASOPHILS NFR BLD AUTO: 0.5 % — SIGNIFICANT CHANGE UP (ref 0–2)
BUN SERPL-MCNC: 15 MG/DL — SIGNIFICANT CHANGE UP (ref 7–23)
CALCIUM SERPL-MCNC: 9.7 MG/DL — SIGNIFICANT CHANGE UP (ref 8.4–10.5)
CHLORIDE SERPL-SCNC: 102 MMOL/L — SIGNIFICANT CHANGE UP (ref 98–107)
CO2 SERPL-SCNC: 23 MMOL/L — SIGNIFICANT CHANGE UP (ref 22–31)
CREAT SERPL-MCNC: 0.84 MG/DL — SIGNIFICANT CHANGE UP (ref 0.5–1.3)
EOSINOPHIL # BLD AUTO: 0.22 K/UL — SIGNIFICANT CHANGE UP (ref 0–0.5)
EOSINOPHIL NFR BLD AUTO: 2 % — SIGNIFICANT CHANGE UP (ref 0–6)
GLUCOSE SERPL-MCNC: 147 MG/DL — HIGH (ref 70–99)
HCT VFR BLD CALC: 43.8 % — SIGNIFICANT CHANGE UP (ref 34.5–45)
HGB BLD-MCNC: 13.4 G/DL — SIGNIFICANT CHANGE UP (ref 11.5–15.5)
IMM GRANULOCYTES NFR BLD AUTO: 0.2 % — SIGNIFICANT CHANGE UP (ref 0–1.5)
LYMPHOCYTES # BLD AUTO: 5.78 K/UL — HIGH (ref 1–3.3)
LYMPHOCYTES # BLD AUTO: 52.1 % — HIGH (ref 13–44)
MAGNESIUM SERPL-MCNC: 1.9 MG/DL — SIGNIFICANT CHANGE UP (ref 1.6–2.6)
MCHC RBC-ENTMCNC: 26.4 PG — LOW (ref 27–34)
MCHC RBC-ENTMCNC: 30.6 % — LOW (ref 32–36)
MCV RBC AUTO: 86.4 FL — SIGNIFICANT CHANGE UP (ref 80–100)
MONOCYTES # BLD AUTO: 0.77 K/UL — SIGNIFICANT CHANGE UP (ref 0–0.9)
MONOCYTES NFR BLD AUTO: 6.9 % — SIGNIFICANT CHANGE UP (ref 2–14)
NEUTROPHILS # BLD AUTO: 4.24 K/UL — SIGNIFICANT CHANGE UP (ref 1.8–7.4)
NEUTROPHILS NFR BLD AUTO: 38.3 % — LOW (ref 43–77)
NRBC # FLD: 0 K/UL — SIGNIFICANT CHANGE UP (ref 0–0)
PLATELET # BLD AUTO: 294 K/UL — SIGNIFICANT CHANGE UP (ref 150–400)
PMV BLD: 9.8 FL — SIGNIFICANT CHANGE UP (ref 7–13)
POTASSIUM SERPL-MCNC: 3.9 MMOL/L — SIGNIFICANT CHANGE UP (ref 3.5–5.3)
POTASSIUM SERPL-SCNC: 3.9 MMOL/L — SIGNIFICANT CHANGE UP (ref 3.5–5.3)
RBC # BLD: 5.07 M/UL — SIGNIFICANT CHANGE UP (ref 3.8–5.2)
RBC # FLD: 13.8 % — SIGNIFICANT CHANGE UP (ref 10.3–14.5)
SODIUM SERPL-SCNC: 140 MMOL/L — SIGNIFICANT CHANGE UP (ref 135–145)
WBC # BLD: 11.09 K/UL — HIGH (ref 3.8–10.5)
WBC # FLD AUTO: 11.09 K/UL — HIGH (ref 3.8–10.5)

## 2019-07-23 PROCEDURE — 73721 MRI JNT OF LWR EXTRE W/O DYE: CPT | Mod: 26,LT

## 2019-07-23 PROCEDURE — 99233 SBSQ HOSP IP/OBS HIGH 50: CPT

## 2019-07-23 RX ADMIN — Medication 10 MILLIGRAM(S): at 21:36

## 2019-07-23 RX ADMIN — Medication 10 MILLIGRAM(S): at 06:41

## 2019-07-23 RX ADMIN — Medication 50 MICROGRAM(S): at 06:42

## 2019-07-23 NOTE — PROGRESS NOTE ADULT - SUBJECTIVE AND OBJECTIVE BOX
Patient is a 78y old  Female who presents with a chief complaint of Recurrent fall at home (22 Jul 2019 14:00)      SUBJECTIVE / OVERNIGHT EVENTS: Pt seen and examined at 12:35pm,  no overnight events, tele with sinus in the 60s to 70s, pt has some left hip and left knee pain, otherwise no other new issues.      MEDICATIONS  (STANDING):  busPIRone 10 milliGRAM(s) Oral three times a day  dextrose 5%. 1000 milliLiter(s) (50 mL/Hr) IV Continuous <Continuous>  dextrose 50% Injectable 12.5 Gram(s) IV Push once  dextrose 50% Injectable 25 Gram(s) IV Push once  dextrose 50% Injectable 25 Gram(s) IV Push once  insulin lispro (HumaLOG) corrective regimen sliding scale   SubCutaneous three times a day before meals  insulin lispro (HumaLOG) corrective regimen sliding scale   SubCutaneous at bedtime  levothyroxine 50 MICROGram(s) Oral daily  venlafaxine XR. 225 milliGRAM(s) Oral daily    MEDICATIONS  (PRN):  dextrose 40% Gel 15 Gram(s) Oral once PRN Blood Glucose LESS THAN 70 milliGRAM(s)/deciliter  glucagon  Injectable 1 milliGRAM(s) IntraMuscular once PRN Glucose LESS THAN 70 milligrams/deciliter      Vital Signs Last 24 Hrs  T(C): 36.5 (23 Jul 2019 05:45), Max: 36.9 (22 Jul 2019 20:45)  T(F): 97.7 (23 Jul 2019 05:45), Max: 98.5 (22 Jul 2019 20:45)  HR: 81 (23 Jul 2019 05:45) (81 - 88)  BP: 126/67 (23 Jul 2019 05:45) (126/67 - 143/66)  BP(mean): --  RR: 18 (23 Jul 2019 05:45) (17 - 18)  SpO2: 97% (23 Jul 2019 05:45) (97% - 97%)  CAPILLARY BLOOD GLUCOSE      POCT Blood Glucose.: 141 mg/dL (23 Jul 2019 08:43)  POCT Blood Glucose.: 244 mg/dL (22 Jul 2019 21:03)  POCT Blood Glucose.: 128 mg/dL (22 Jul 2019 17:40)    I&O's Summary      PHYSICAL EXAM:  GENERAL: NAD, well-developed  CHEST/LUNG: Clear to auscultation bilaterally; No wheeze  HEART: Regular rate and rhythm; No murmurs  ABDOMEN: Soft, Nontender, Nondistended  EXTREMITIES: no LE edema, left paraspinal minimal tenderness+  PSYCH: Calm  NEUROLOGY: AAOx3  SKIN: No rashes or lesions      LABS:                        13.4   11.09 )-----------( 294      ( 23 Jul 2019 05:55 )             43.8     07-23    140  |  102  |  15  ----------------------------<  147<H>  3.9   |  23  |  0.84    Ca    9.7      23 Jul 2019 05:55  Mg     1.9     07-23                RADIOLOGY & ADDITIONAL TESTS:    Imaging Personally Reviewed:    Consultant(s) Notes Reviewed:      Care Discussed with Consultants/Other Providers:

## 2019-07-24 ENCOUNTER — TRANSCRIPTION ENCOUNTER (OUTPATIENT)
Age: 79
End: 2019-07-24

## 2019-07-24 LAB
ANION GAP SERPL CALC-SCNC: 14 MMO/L — SIGNIFICANT CHANGE UP (ref 7–14)
BASOPHILS # BLD AUTO: 0.05 K/UL — SIGNIFICANT CHANGE UP (ref 0–0.2)
BASOPHILS NFR BLD AUTO: 0.6 % — SIGNIFICANT CHANGE UP (ref 0–2)
BUN SERPL-MCNC: 14 MG/DL — SIGNIFICANT CHANGE UP (ref 7–23)
CALCIUM SERPL-MCNC: 9.8 MG/DL — SIGNIFICANT CHANGE UP (ref 8.4–10.5)
CHLORIDE SERPL-SCNC: 103 MMOL/L — SIGNIFICANT CHANGE UP (ref 98–107)
CO2 SERPL-SCNC: 24 MMOL/L — SIGNIFICANT CHANGE UP (ref 22–31)
CREAT SERPL-MCNC: 0.8 MG/DL — SIGNIFICANT CHANGE UP (ref 0.5–1.3)
EOSINOPHIL # BLD AUTO: 0.16 K/UL — SIGNIFICANT CHANGE UP (ref 0–0.5)
EOSINOPHIL NFR BLD AUTO: 1.9 % — SIGNIFICANT CHANGE UP (ref 0–6)
GLUCOSE SERPL-MCNC: 193 MG/DL — HIGH (ref 70–99)
HCT VFR BLD CALC: 40.5 % — SIGNIFICANT CHANGE UP (ref 34.5–45)
HGB BLD-MCNC: 12.6 G/DL — SIGNIFICANT CHANGE UP (ref 11.5–15.5)
IMM GRANULOCYTES NFR BLD AUTO: 0.2 % — SIGNIFICANT CHANGE UP (ref 0–1.5)
LYMPHOCYTES # BLD AUTO: 3.37 K/UL — HIGH (ref 1–3.3)
LYMPHOCYTES # BLD AUTO: 40.5 % — SIGNIFICANT CHANGE UP (ref 13–44)
MAGNESIUM SERPL-MCNC: 1.9 MG/DL — SIGNIFICANT CHANGE UP (ref 1.6–2.6)
MCHC RBC-ENTMCNC: 26.6 PG — LOW (ref 27–34)
MCHC RBC-ENTMCNC: 31.1 % — LOW (ref 32–36)
MCV RBC AUTO: 85.4 FL — SIGNIFICANT CHANGE UP (ref 80–100)
MONOCYTES # BLD AUTO: 0.52 K/UL — SIGNIFICANT CHANGE UP (ref 0–0.9)
MONOCYTES NFR BLD AUTO: 6.2 % — SIGNIFICANT CHANGE UP (ref 2–14)
NEUTROPHILS # BLD AUTO: 4.21 K/UL — SIGNIFICANT CHANGE UP (ref 1.8–7.4)
NEUTROPHILS NFR BLD AUTO: 50.6 % — SIGNIFICANT CHANGE UP (ref 43–77)
NRBC # FLD: 0 K/UL — SIGNIFICANT CHANGE UP (ref 0–0)
PLATELET # BLD AUTO: 296 K/UL — SIGNIFICANT CHANGE UP (ref 150–400)
PMV BLD: 9.8 FL — SIGNIFICANT CHANGE UP (ref 7–13)
POTASSIUM SERPL-MCNC: 3.8 MMOL/L — SIGNIFICANT CHANGE UP (ref 3.5–5.3)
POTASSIUM SERPL-SCNC: 3.8 MMOL/L — SIGNIFICANT CHANGE UP (ref 3.5–5.3)
RBC # BLD: 4.74 M/UL — SIGNIFICANT CHANGE UP (ref 3.8–5.2)
RBC # FLD: 13.7 % — SIGNIFICANT CHANGE UP (ref 10.3–14.5)
SODIUM SERPL-SCNC: 141 MMOL/L — SIGNIFICANT CHANGE UP (ref 135–145)
WBC # BLD: 8.33 K/UL — SIGNIFICANT CHANGE UP (ref 3.8–10.5)
WBC # FLD AUTO: 8.33 K/UL — SIGNIFICANT CHANGE UP (ref 3.8–10.5)

## 2019-07-24 PROCEDURE — 99232 SBSQ HOSP IP/OBS MODERATE 35: CPT

## 2019-07-24 RX ORDER — SENNA PLUS 8.6 MG/1
2 TABLET ORAL
Qty: 0 | Refills: 0 | DISCHARGE
Start: 2019-07-24

## 2019-07-24 RX ORDER — DOCUSATE SODIUM 100 MG
100 CAPSULE ORAL DAILY
Refills: 0 | Status: DISCONTINUED | OUTPATIENT
Start: 2019-07-24 | End: 2019-07-25

## 2019-07-24 RX ORDER — DOCUSATE SODIUM 100 MG
1 CAPSULE ORAL
Qty: 0 | Refills: 0 | DISCHARGE
Start: 2019-07-24

## 2019-07-24 RX ORDER — SENNA PLUS 8.6 MG/1
2 TABLET ORAL AT BEDTIME
Refills: 0 | Status: DISCONTINUED | OUTPATIENT
Start: 2019-07-24 | End: 2019-07-25

## 2019-07-24 RX ORDER — LEVOTHYROXINE SODIUM 125 MCG
1 TABLET ORAL
Qty: 0 | Refills: 0 | DISCHARGE
Start: 2019-07-24

## 2019-07-24 RX ADMIN — Medication 100 MILLIGRAM(S): at 15:59

## 2019-07-24 RX ADMIN — Medication 10 MILLIGRAM(S): at 05:40

## 2019-07-24 RX ADMIN — Medication 2: at 13:19

## 2019-07-24 RX ADMIN — Medication 1: at 17:49

## 2019-07-24 RX ADMIN — Medication 10 MILLIGRAM(S): at 21:14

## 2019-07-24 RX ADMIN — Medication 1: at 09:37

## 2019-07-24 RX ADMIN — Medication 225 MILLIGRAM(S): at 13:18

## 2019-07-24 RX ADMIN — Medication 50 MICROGRAM(S): at 05:41

## 2019-07-24 RX ADMIN — Medication 10 MILLIGRAM(S): at 13:19

## 2019-07-24 NOTE — DISCHARGE NOTE PROVIDER - CARE PROVIDER_API CALL
[facility],   Phone: [phone]  Fax: (   )    -  Follow Up Time:     Your Psychiatrist,   Phone: (   )    -  Fax: (   )    -  Follow Up Time:     Your Primary Care Physician,   Phone: (   )    -  Fax: (   )    -  Follow Up Time: [facility],   Phone: [phone]  Fax: (   )    -  Follow Up Time: 2 weeks    Your Psychiatrist,   Phone: (   )    -  Fax: (   )    -  Follow Up Time: 1 week    Your Primary Care Physician,   Phone: (   )    -  Fax: (   )    -  Follow Up Time: 1 week

## 2019-07-24 NOTE — DISCHARGE NOTE PROVIDER - HOSPITAL COURSE
79y/o Female with PMHx of DMII, hypothyroidism, anxiety disorder presents to [facility] ED s/p mechanical and syncopal falls now complaining of hip and knee pain and difficulty ambulating.             1. Recurrent falls while walking.      -CT head/c-spine: no acute pathology    -CT L hip: focal cortical disruption at the junction of left femur neck-greater trochanter as described, concerning for incomplete fracture.     -Per Ortho, pt is now ambulatory, low suspicion for hip fracture, fall with contusion to hip and knee    -L Hip MRI: No evidence of acute fracture is seen. There is severe lower lumbar spondylosis, with limited evaluation    -PT recommend rehab on discharge, fall risk            2. Syncope and collapse.      -pt denies syncope, but admits to dizziness    -cardiology following: orthostatics negative, EKG is sinus with non-specific t-wave changes, no ectopy on telemetry thus far, Echo unremarkable. No further cardiology workup    -Psych called for suspicion for polypharmacy: no recent med changes, patient has tolerated regimen for years without issue. Psych regimen unlikely to contribute at this time, Effexor is not known to increase fall risk though major risk at this dose would be HTN. Given continued depression features would continue, should f/u with outpatient psych        3. Hypothyroidism.      -pt is on synthroid at home, clarified med and resumed synthroid 50 mcg qd    -euthyroid TSH 2.58, free T4 1.04.         Case discussed with Dr. Chow and patient is medically stable for discharge to rehab.

## 2019-07-24 NOTE — DISCHARGE NOTE PROVIDER - PROVIDER TOKENS
FREE:[LAST:[[facility]],PHONE:[phone]],FAX:[(   )    -]],FREE:[LAST:[Your Psychiatrist],PHONE:[(   )    -],FAX:[(   )    -]],FREE:[LAST:[Your Primary Care Physician],PHONE:[(   )    -],FAX:[(   )    -]] FREE:[LAST:[[facility]],PHONE:[phone]],FAX:[(   )    -],FOLLOWUP:[2 weeks]],FREE:[LAST:[Your Psychiatrist],PHONE:[(   )    -],FAX:[(   )    -],FOLLOWUP:[1 week]],FREE:[LAST:[Your Primary Care Physician],PHONE:[(   )    -],FAX:[(   )    -],FOLLOWUP:[1 week]]

## 2019-07-24 NOTE — PROGRESS NOTE ADULT - SUBJECTIVE AND OBJECTIVE BOX
Patient is a 78y old  Female who presents with a chief complaint of Recurrent fall at home (24 Jul 2019 11:54)      SUBJECTIVE / OVERNIGHT EVENTS: Pt seen and examined at 10:40am,  no overnight events, tele with sinus, c/o constipation x3 days, no nausea, vomiting or abdominal pain, otherwise no other new issues.      MEDICATIONS  (STANDING):  busPIRone 10 milliGRAM(s) Oral three times a day  dextrose 5%. 1000 milliLiter(s) (50 mL/Hr) IV Continuous <Continuous>  dextrose 50% Injectable 12.5 Gram(s) IV Push once  dextrose 50% Injectable 25 Gram(s) IV Push once  dextrose 50% Injectable 25 Gram(s) IV Push once  docusate sodium 100 milliGRAM(s) Oral daily  insulin lispro (HumaLOG) corrective regimen sliding scale   SubCutaneous three times a day before meals  insulin lispro (HumaLOG) corrective regimen sliding scale   SubCutaneous at bedtime  levothyroxine 50 MICROGram(s) Oral daily  senna 2 Tablet(s) Oral at bedtime  venlafaxine XR. 225 milliGRAM(s) Oral daily    MEDICATIONS  (PRN):  dextrose 40% Gel 15 Gram(s) Oral once PRN Blood Glucose LESS THAN 70 milliGRAM(s)/deciliter  glucagon  Injectable 1 milliGRAM(s) IntraMuscular once PRN Glucose LESS THAN 70 milligrams/deciliter      Vital Signs Last 24 Hrs  T(C): 36.8 (24 Jul 2019 12:43), Max: 37 (24 Jul 2019 05:40)  T(F): 98.2 (24 Jul 2019 12:43), Max: 98.6 (24 Jul 2019 05:40)  HR: 81 (24 Jul 2019 12:43) (79 - 86)  BP: 123/61 (24 Jul 2019 12:43) (123/61 - 140/79)  BP(mean): --  RR: 18 (24 Jul 2019 12:43) (18 - 19)  SpO2: 97% (24 Jul 2019 12:43) (96% - 97%)  CAPILLARY BLOOD GLUCOSE      POCT Blood Glucose.: 244 mg/dL (24 Jul 2019 12:43)  POCT Blood Glucose.: 167 mg/dL (24 Jul 2019 09:05)  POCT Blood Glucose.: 192 mg/dL (23 Jul 2019 21:53)  POCT Blood Glucose.: 224 mg/dL (23 Jul 2019 17:41)    I&O's Summary      PHYSICAL EXAM:  GENERAL: NAD, well-developed  CHEST/LUNG: Clear to auscultation bilaterally; No wheeze  HEART: Regular rate and rhythm; No murmurs  ABDOMEN: Soft, Nontender, Nondistended  EXTREMITIES: no LE edema  PSYCH: Calm  NEUROLOGY: AAOx3, sitting in a chair  SKIN: No rashes or lesions        LABS:                        12.6   8.33  )-----------( 296      ( 24 Jul 2019 06:30 )             40.5     07-24    141  |  103  |  14  ----------------------------<  193<H>  3.8   |  24  |  0.80    Ca    9.8      24 Jul 2019 06:30  Mg     1.9     07-24                RADIOLOGY & ADDITIONAL TESTS:    Imaging Personally Reviewed:    Consultant(s) Notes Reviewed:      Care Discussed with Consultants/Other Providers:

## 2019-07-25 ENCOUNTER — TRANSCRIPTION ENCOUNTER (OUTPATIENT)
Age: 79
End: 2019-07-25

## 2019-07-25 VITALS
TEMPERATURE: 98 F | OXYGEN SATURATION: 100 % | RESPIRATION RATE: 17 BRPM | SYSTOLIC BLOOD PRESSURE: 144 MMHG | DIASTOLIC BLOOD PRESSURE: 69 MMHG | HEART RATE: 80 BPM

## 2019-07-25 LAB
ANION GAP SERPL CALC-SCNC: 11 MMO/L — SIGNIFICANT CHANGE UP (ref 7–14)
BUN SERPL-MCNC: 13 MG/DL — SIGNIFICANT CHANGE UP (ref 7–23)
CALCIUM SERPL-MCNC: 9.7 MG/DL — SIGNIFICANT CHANGE UP (ref 8.4–10.5)
CHLORIDE SERPL-SCNC: 105 MMOL/L — SIGNIFICANT CHANGE UP (ref 98–107)
CO2 SERPL-SCNC: 26 MMOL/L — SIGNIFICANT CHANGE UP (ref 22–31)
CREAT SERPL-MCNC: 0.8 MG/DL — SIGNIFICANT CHANGE UP (ref 0.5–1.3)
GLUCOSE SERPL-MCNC: 157 MG/DL — HIGH (ref 70–99)
HCT VFR BLD CALC: 40.3 % — SIGNIFICANT CHANGE UP (ref 34.5–45)
HGB BLD-MCNC: 12.7 G/DL — SIGNIFICANT CHANGE UP (ref 11.5–15.5)
MAGNESIUM SERPL-MCNC: 1.9 MG/DL — SIGNIFICANT CHANGE UP (ref 1.6–2.6)
MCHC RBC-ENTMCNC: 27.5 PG — SIGNIFICANT CHANGE UP (ref 27–34)
MCHC RBC-ENTMCNC: 31.5 % — LOW (ref 32–36)
MCV RBC AUTO: 87.4 FL — SIGNIFICANT CHANGE UP (ref 80–100)
NRBC # FLD: 0 K/UL — SIGNIFICANT CHANGE UP (ref 0–0)
PLATELET # BLD AUTO: 285 K/UL — SIGNIFICANT CHANGE UP (ref 150–400)
PMV BLD: 9.9 FL — SIGNIFICANT CHANGE UP (ref 7–13)
POTASSIUM SERPL-MCNC: 3.9 MMOL/L — SIGNIFICANT CHANGE UP (ref 3.5–5.3)
POTASSIUM SERPL-SCNC: 3.9 MMOL/L — SIGNIFICANT CHANGE UP (ref 3.5–5.3)
RBC # BLD: 4.61 M/UL — SIGNIFICANT CHANGE UP (ref 3.8–5.2)
RBC # FLD: 13.8 % — SIGNIFICANT CHANGE UP (ref 10.3–14.5)
SODIUM SERPL-SCNC: 142 MMOL/L — SIGNIFICANT CHANGE UP (ref 135–145)
WBC # BLD: 9.88 K/UL — SIGNIFICANT CHANGE UP (ref 3.8–10.5)
WBC # FLD AUTO: 9.88 K/UL — SIGNIFICANT CHANGE UP (ref 3.8–10.5)

## 2019-07-25 PROCEDURE — 99239 HOSP IP/OBS DSCHRG MGMT >30: CPT

## 2019-07-25 RX ORDER — OXYCODONE HYDROCHLORIDE 5 MG/1
1 TABLET ORAL
Qty: 12 | Refills: 0
Start: 2019-07-25 | End: 2019-07-27

## 2019-07-25 RX ORDER — POLYETHYLENE GLYCOL 3350 17 G/17G
17 POWDER, FOR SOLUTION ORAL ONCE
Refills: 0 | Status: COMPLETED | OUTPATIENT
Start: 2019-07-25 | End: 2019-07-25

## 2019-07-25 RX ADMIN — Medication 225 MILLIGRAM(S): at 13:44

## 2019-07-25 RX ADMIN — Medication 10 MILLIGRAM(S): at 13:44

## 2019-07-25 RX ADMIN — Medication 50 MICROGRAM(S): at 05:22

## 2019-07-25 RX ADMIN — Medication 100 MILLIGRAM(S): at 13:44

## 2019-07-25 RX ADMIN — Medication 10 MILLIGRAM(S): at 05:22

## 2019-07-25 RX ADMIN — Medication 2: at 13:44

## 2019-07-25 RX ADMIN — POLYETHYLENE GLYCOL 3350 17 GRAM(S): 17 POWDER, FOR SOLUTION ORAL at 12:25

## 2019-07-25 NOTE — PROGRESS NOTE ADULT - PROBLEM SELECTOR PLAN 5
- Hold metformin.  - SSI for coverage with FSG QID  check a1c
- Hold metformin.  - SSI for coverage with FSG QID  a1c 7.6

## 2019-07-25 NOTE — DISCHARGE NOTE NURSING/CASE MANAGEMENT/SOCIAL WORK - NSDCDPATPORTLINK_GEN_ALL_CORE
You can access the [employer] Patient Portal, offered by [facility], by registering with the following website: [email or web address]

## 2019-07-25 NOTE — PROGRESS NOTE ADULT - REASON FOR ADMISSION
Recurrent fall at home

## 2019-07-25 NOTE — PROGRESS NOTE ADULT - PROBLEM SELECTOR PLAN 3
- Hx of documented hypothyroidism however, not recalling taking synthroid at this time.  - Check TSH and free T4 in the am, resume synthroid if necessary
-pt is on synthroid at home, clarified med and resumed synthroid 50 mcg qd  -euthyroid TSH 2.58, free T4 1.04

## 2019-07-25 NOTE — PROGRESS NOTE ADULT - SUBJECTIVE AND OBJECTIVE BOX
Patient is a 78y old  Female who presents with a chief complaint of Recurrent fall at home (24 Jul 2019 14:53)      SUBJECTIVE / OVERNIGHT EVENTS: Pt seen and examined at 11am, no overnight events, tele with sinus, states she had bm yesterday, no nausea, vomiting or abdominal pain, otherwise no other new issues.        MEDICATIONS  (STANDING):  busPIRone 10 milliGRAM(s) Oral three times a day  dextrose 5%. 1000 milliLiter(s) (50 mL/Hr) IV Continuous <Continuous>  dextrose 50% Injectable 12.5 Gram(s) IV Push once  dextrose 50% Injectable 25 Gram(s) IV Push once  dextrose 50% Injectable 25 Gram(s) IV Push once  docusate sodium 100 milliGRAM(s) Oral daily  insulin lispro (HumaLOG) corrective regimen sliding scale   SubCutaneous three times a day before meals  insulin lispro (HumaLOG) corrective regimen sliding scale   SubCutaneous at bedtime  levothyroxine 50 MICROGram(s) Oral daily  senna 2 Tablet(s) Oral at bedtime  venlafaxine XR. 225 milliGRAM(s) Oral daily    MEDICATIONS  (PRN):  dextrose 40% Gel 15 Gram(s) Oral once PRN Blood Glucose LESS THAN 70 milliGRAM(s)/deciliter  glucagon  Injectable 1 milliGRAM(s) IntraMuscular once PRN Glucose LESS THAN 70 milligrams/deciliter      Vital Signs Last 24 Hrs  T(C): 36.7 (25 Jul 2019 05:29), Max: 36.7 (24 Jul 2019 21:27)  T(F): 98.1 (25 Jul 2019 05:29), Max: 98.1 (25 Jul 2019 05:29)  HR: 80 (25 Jul 2019 05:29) (80 - 80)  BP: 144/69 (25 Jul 2019 05:29) (134/70 - 144/69)  BP(mean): --  RR: 17 (25 Jul 2019 05:29) (17 - 18)  SpO2: 100% (25 Jul 2019 05:29) (100% - 100%)  CAPILLARY BLOOD GLUCOSE      POCT Blood Glucose.: 215 mg/dL (25 Jul 2019 12:58)  POCT Blood Glucose.: 140 mg/dL (25 Jul 2019 09:05)  POCT Blood Glucose.: 166 mg/dL (24 Jul 2019 21:53)  POCT Blood Glucose.: 186 mg/dL (24 Jul 2019 17:39)    I&O's Summary      PHYSICAL EXAM:  GENERAL: NAD, well-developed  CHEST/LUNG: Clear to auscultation bilaterally; No wheeze  HEART: Regular rate and rhythm; No murmurs  ABDOMEN: Soft, Nontender, Nondistended  EXTREMITIES: no LE edema  PSYCH: Calm  NEUROLOGY: AAOx3  SKIN: No rashes or lesions    LABS:                        12.7   9.88  )-----------( 285      ( 25 Jul 2019 05:45 )             40.3     07-25    142  |  105  |  13  ----------------------------<  157<H>  3.9   |  26  |  0.80    Ca    9.7      25 Jul 2019 05:45  Mg     1.9     07-25                RADIOLOGY & ADDITIONAL TESTS:    Imaging Personally Reviewed:    Consultant(s) Notes Reviewed:      Care Discussed with Consultants/Other Providers:

## 2019-07-25 NOTE — PROGRESS NOTE ADULT - PROBLEM SELECTOR PROBLEM 1
Recurrent falls while walking

## 2019-07-25 NOTE — PROGRESS NOTE ADULT - PROBLEM SELECTOR PLAN 1
- Patient presented with recurrent falls at home   -CT head/c-spine no acute pathology  -CT L hip focal cortical disruption at the junction of left femur   neck-greater trochanter as described, concerning for incomplete fracture.   - Ortho f/u appreciated, pt is now ambulatory, low suspicion for hip fracture,  -fall with contusion to hip and knee per ortho  -PT consult recommend rehab on discharge, fall risk  -pain control  MRI of the hip neg for fracture, discussed with Ortho awaiting d/c to rehab
- Patient presented with recurrent falls at home   -CT head/c-spine no acute pathology  -CT L hip focal cortical disruption at the junction of left femur   neck-greater trochanter as described, concerning for incomplete fracture.   - Ortho f/u appreciated, pt is now ambulatory, low suspicion for hip fracture,  -fall with contusion to hip and knee per ortho  -PT consult recommend rehab on discharge, fall risk  -pain control  MRI of the hip neg for fracture, discussed with Ortho, d/c today to rehab, d/c planning time spent in coordination 45 mts ( discussion with PA, CM, SW, preparing d/c summary and plan)
- Patient presented with recurrent falls at home   -CT head/c-spine no acute pathology  -CT L hip focal cortical disruption at the junction of left femur   neck-greater trochanter as described, concerning for incomplete fracture.   - Ortho f/u appreciated, pt is now ambulatory, low suspicion for hip fracture,  -fall with contusion to hip and knee per ortho  -PT consult recommend rehab on discharge, fall risk  -pain control  MRI of the hip pending, f/u results if neg will plan for d/c to rehab
- Patient presented with recurrent falls at home   -CT head/c-spine no acute pathology  -CT L hip focal cortical disruption at the junction of left femur   neck-greater trochanter as described, concerning for incomplete fracture.   - Ortho f/u appreciated, pt is now ambulatory, low suspicion for hip fracture, no need for MRI and no plan for OR  -fall with contusion to hip and knee per ortho  -PT consult recommend rehab on discharge, fall risk  -pain control
- Patient presented with recurrent falls at home   -CT head/c-spine no acute pathology  -CT L hip focal cortical disruption at the junction of left femur   neck-greater trochanter as described, concerning for incomplete fracture.   - Ortho f/u appreciated, pt is now ambulatory, low suspicion for hip fracture, no need for MRI and no plan for OR, will confirm with ortho, ortho to call back  -fall with contusion to hip and knee per ortho  -PT consult recommend rehab on discharge, fall risk  -pain control
- Patient presented with recurrent falls at home   -CT head/c-spine no acute pathology  -CT L hip focal cortical disruption at the junction of left femur   neck-greater trochanter as described, concerning for incomplete fracture.   - Ortho f/u appreciated, pt is now ambulatory, low suspicion for hip fracture, no need for MRI and no plan for OR  -fall with contusion to hip and knee per ortho  -PT consult, fall risk  -pain control

## 2019-07-25 NOTE — PROGRESS NOTE ADULT - PROBLEM SELECTOR PLAN 6
- Restart home dose regimen Effexor with buspirone.   - Monitor medication SE.  -psych consult
- Restart home dose regimen Effexor with buspirone.   - Monitor medication SE.  -psych consult appreciated cont current med and f/u as outpt
- Restart home dose regimen Effexor with buspirone.   - Monitor medication SE.  -psych consult pending
- Restart home dose regimen Effexor with buspirone.   - Monitor medication SE.  -psych consult pending

## 2019-07-26 NOTE — DISCUSSION/SUMMARY
[Rehab] : patient was discharged to rehab [FreeTextEntry1] : 78F with DM2 ( last A1c 7.1%), hypothyroidism, depression with anxiety, R LBP, chronic diarrhea that was hospitalized at [facility] on 7/20 after a fall. The patient was not found to have any acute fractures and syncope workup was inconclusive. The patient was stable during her hospital stay and was discharged to [facility] on 7/25/19 where she is currently. Spoke with patient's son over the phone. He states that the patient is doing well in rehab and is recovering. However, the patient's son is unsure of how long the patient will be in rehab. Instructed him to schedule an appointment with [facility] for a follow up appointment as soon as patient returns home from rehab.

## 2019-07-29 ENCOUNTER — APPOINTMENT (OUTPATIENT)
Dept: INTERNAL MEDICINE | Facility: CLINIC | Age: 79
End: 2019-07-29

## 2019-07-30 ENCOUNTER — APPOINTMENT (OUTPATIENT)
Dept: ULTRASOUND IMAGING | Facility: IMAGING CENTER | Age: 79
End: 2019-07-30

## 2019-08-20 ENCOUNTER — RX RENEWAL (OUTPATIENT)
Age: 79
End: 2019-08-20

## 2019-08-21 ENCOUNTER — OUTPATIENT (OUTPATIENT)
Dept: OUTPATIENT SERVICES | Facility: HOSPITAL | Age: 79
LOS: 1 days | End: 2019-08-21
Payer: MEDICARE

## 2019-08-21 ENCOUNTER — LABORATORY RESULT (OUTPATIENT)
Age: 79
End: 2019-08-21

## 2019-08-21 ENCOUNTER — APPOINTMENT (OUTPATIENT)
Dept: INTERNAL MEDICINE | Facility: CLINIC | Age: 79
End: 2019-08-21

## 2019-08-21 VITALS
HEART RATE: 88 BPM | OXYGEN SATURATION: 97 % | SYSTOLIC BLOOD PRESSURE: 118 MMHG | DIASTOLIC BLOOD PRESSURE: 74 MMHG | RESPIRATION RATE: 16 BRPM

## 2019-08-21 VITALS — HEIGHT: 61 IN | BODY MASS INDEX: 33.99 KG/M2 | WEIGHT: 180 LBS

## 2019-08-21 DIAGNOSIS — M25.561 PAIN IN RIGHT KNEE: ICD-10-CM

## 2019-08-21 DIAGNOSIS — M54.5 LOW BACK PAIN: ICD-10-CM

## 2019-08-21 DIAGNOSIS — G89.29 LOW BACK PAIN: ICD-10-CM

## 2019-08-21 DIAGNOSIS — Z60.2 PROBLEMS RELATED TO LIVING ALONE: ICD-10-CM

## 2019-08-21 DIAGNOSIS — M25.562 PAIN IN RIGHT KNEE: ICD-10-CM

## 2019-08-21 DIAGNOSIS — Z90.711 ACQUIRED ABSENCE OF UTERUS WITH REMAINING CERVICAL STUMP: Chronic | ICD-10-CM

## 2019-08-21 DIAGNOSIS — Z86.69 PERSONAL HISTORY OF OTHER DISEASES OF THE NERVOUS SYSTEM AND SENSE ORGANS: Chronic | ICD-10-CM

## 2019-08-21 LAB
CHOLEST SERPL-MCNC: 122 MG/DL — SIGNIFICANT CHANGE UP (ref 120–199)
GLUCOSE BLDC GLUCOMTR-MCNC: 241
HDLC SERPL-MCNC: 58 MG/DL — SIGNIFICANT CHANGE UP (ref 45–65)
LIPID PNL WITH DIRECT LDL SERPL: 53 MG/DL — SIGNIFICANT CHANGE UP
TRIGL SERPL-MCNC: 121 MG/DL — SIGNIFICANT CHANGE UP (ref 10–149)
TSH SERPL-MCNC: 2.32 UIU/ML — SIGNIFICANT CHANGE UP (ref 0.27–4.2)
VIT B12 SERPL-MCNC: 174 PG/ML — LOW (ref 200–900)

## 2019-08-21 RX ORDER — HYDROCORTISONE 25 MG/G
2.5 CREAM TOPICAL TWICE DAILY
Qty: 90 | Refills: 1 | Status: DISCONTINUED | COMMUNITY
Start: 2017-05-19 | End: 2019-08-21

## 2019-08-21 RX ORDER — GABAPENTIN 100 MG/1
100 CAPSULE ORAL
Qty: 90 | Refills: 0 | Status: DISCONTINUED | COMMUNITY
Start: 2017-05-08 | End: 2019-08-21

## 2019-08-21 RX ORDER — POLYETHYLENE GLYCOL 3350 AND ELECTROLYTES WITH LEMON FLAVOR 236; 22.74; 6.74; 5.86; 2.97 G/4L; G/4L; G/4L; G/4L; G/4L
236 POWDER, FOR SOLUTION ORAL
Qty: 1 | Refills: 0 | Status: DISCONTINUED | COMMUNITY
Start: 2018-08-16 | End: 2019-08-21

## 2019-08-21 SDOH — SOCIAL STABILITY - SOCIAL INSECURITY: PROBLEMS RELATED TO LIVING ALONE: Z60.2

## 2019-08-21 NOTE — PHYSICAL THERAPY INITIAL EVALUATION ADULT - IMPAIRMENTS CONTRIBUTING TO GAIT DEVIATIONS, PT EVAL
[>50% of Time Spent on Counseling for ____] : Greater than 50% of the encounter time was spent on counseling for [unfilled] [Time Spent: ___ minutes] : I have spent [unfilled] minutes of face to face time with the patient impaired balance/impaired postural control/decreased strength

## 2019-08-21 NOTE — COUNSELING
[Fall prevention counseling provided] : Fall prevention counseling provided [Adequate lighting] : Adequate lighting [No throw rugs] : No throw rugs [Use proper foot wear] : Use proper foot wear [Use recommended devices] : Use recommended devices

## 2019-08-22 PROBLEM — M54.5 CHRONIC BILATERAL LOW BACK PAIN: Status: ACTIVE | Noted: 2017-02-27

## 2019-08-22 LAB
24R-OH-CALCIDIOL SERPL-MCNC: 36.7 NG/ML — SIGNIFICANT CHANGE UP (ref 30–80)
CREAT UR-MCNC: 241 MG/DL — SIGNIFICANT CHANGE UP
MICROALBUMIN UR-MCNC: 3.5 MG/DL — SIGNIFICANT CHANGE UP
MICROALBUMIN/CREAT UR-RTO: 15 MG/G — SIGNIFICANT CHANGE UP (ref 0–30)

## 2019-08-22 NOTE — HISTORY OF PRESENT ILLNESS
[Post-hospitalization from ___ Hospital] : Post-hospitalization from [unfilled] Hospital [Admitted on: ___] : The patient was admitted on [unfilled] [Discharged on ___] : discharged on [unfilled] [FreeTextEntry3] : I have reviewed the discharge summary, discharge med list, pertinent labs, and radiology findings  [FreeTextEntry2] : \par Pt is accompanied by daughter-in-law, Lizette. \par \par 78 y.o F with T2DM (A1C 7.6% on 7/21/19), hypothyroidism, depression with anxiety, osteopenia, vitamin D deficiency, chronic LBP, obesity, ELAINA, chronic diarrhea, hospitalized a month ago after sustaining mechanical and syncopal falls.  Seen by ortho and possible incomplete fracture to L hip as evidenced on CT, however pt able to ambulate therefore low suspicion for fracture. After syncope workup inconclusive including cardiac work-up, pt discharged to [facility] on 7/25 and remained in rehab until 8/17/19. Since d/c, continues having difficulty ambulating, using a walker, and continues having bilateral knee pain (R>L) especially when walking and when lifting legs. In addition, reports LBP which she has experienced >1 year. Has attempted taking Gabapentin with no improvement and applying aspercreme topical patch for back and tiger balm to bilateral knees with no relief. Has not attempted acetaminophen as recommended upon hospital d/c. States medicaid nurse visited pt and was evaluated for HHA services. Admits to dizziness and nausea. Denies weakness, numbness/tingling, CP, SOB, palpitations, abdominal pain, vomiting, BRBPR, black/tarry stools, unintentional weight loss, or recent travel. \par \par --------\par -Hospital Course-\par 77y/o Female with PMHx of DMII, hypothyroidism, anxiety disorder presents to\par [facility] ED s/p mechanical and syncopal falls now complaining of hip and knee pain\par and difficulty ambulating.\par \par 1. Recurrent falls while walking.\par -CT head/c-spine: no acute pathology\par -CT L hip: focal cortical disruption at the junction of left femur neck-greater\par trochanter as described, concerning for incomplete fracture.\par -Per Ortho, pt is now ambulatory, low suspicion for hip fracture, fall with\par contusion to hip and knee\par -L Hip MRI: No evidence of acute fracture is seen. There is severe lower lumbar\par spondylosis, with limited evaluation\par -PT recommend rehab on discharge, fall risk\par \par 2. Syncope and collapse.\par -pt denies syncope, but admits to dizziness\par -cardiology following: orthostatics negative, EKG is sinus with non-specific\par t-wave changes, no ectopy on telemetry thus far, Echo unremarkable. No further\par cardiology workup\par -Psych called for suspicion for polypharmacy: no recent med changes, patient\par has tolerated regimen for years without issue. Psych regimen unlikely to\par contribute at this time, Effexor is not known to increase fall risk though\par major risk at this dose would be HTN. Given continued depression features would\par continue, should f/u with outpatient psych\par \par 3. Hypothyroidism.\par -pt is on synthroid at home, clarified med and resumed synthroid 50 mcg qd\par -euthyroid TSH 2.58, free T4 1.04.

## 2019-08-22 NOTE — PHYSICAL EXAM
[No Acute Distress] : no acute distress [Well Developed] : well developed [Well Nourished] : well nourished [Normal Outer Ear/Nose] : the outer ears and nose were normal in appearance [Normal Sclera/Conjunctiva] : normal sclera/conjunctiva [Normal Oropharynx] : the oropharynx was normal [No JVD] : no jugular venous distention [Normal TMs] : both tympanic membranes were normal [No Lymphadenopathy] : no lymphadenopathy [Supple] : supple [Thyroid Normal, No Nodules] : the thyroid was normal and there were no nodules present [No Respiratory Distress] : no respiratory distress  [No Accessory Muscle Use] : no accessory muscle use [Clear to Auscultation] : lungs were clear to auscultation bilaterally [Regular Rhythm] : with a regular rhythm [Normal Rate] : normal rate  [No Murmur] : no murmur heard [Normal S1, S2] : normal S1 and S2 [Pedal Pulses Present] : the pedal pulses are present [No Edema] : there was no peripheral edema [Soft] : abdomen soft [Non Tender] : non-tender [Non-distended] : non-distended [No Masses] : no abdominal mass palpated [No HSM] : no HSM [No CVA Tenderness] : no CVA  tenderness [Normal Bowel Sounds] : normal bowel sounds [No Spinal Tenderness] : no spinal tenderness [Grossly Normal Strength/Tone] : grossly normal strength/tone [No Joint Swelling] : no joint swelling [Normal] : motor strength was normal in all muscle groups [Normal Motor Tone] : the muscle tone was normal [Involuntary Movements] : no involuntary movements were seen [No Rash] : no rash [Coordination Grossly Intact] : coordination grossly intact [No Focal Deficits] : no focal deficits [de-identified] : Appears uncomfortable [de-identified] : O [de-identified] : Uses walker to ambulate. Lumbar paraspinal muscle spasms palpated (L>R)

## 2019-08-22 NOTE — REVIEW OF SYSTEMS
[Nausea] : nausea [Fever] : no fever [Fatigue] : no fatigue [Recent Change In Weight] : ~T no recent weight change [Chest Pain] : no chest pain [Palpitations] : no palpitations [Leg Claudication] : no leg claudication [Lower Ext Edema] : no lower extremity edema [Shortness Of Breath] : no shortness of breath [Wheezing] : no wheezing [Cough] : no cough [Abdominal Pain] : no abdominal pain [Constipation] : no constipation [Vomiting] : no vomiting [Dysuria] : no dysuria [Hematuria] : no hematuria [Frequency] : no frequency [Skin Rash] : no skin rash [Suicidal] : not suicidal [FreeTextEntry2] : hot flashes, hair thinning, nails not growing [FreeTextEntry7] : Occasional diarrhea  [FreeTextEntry9] : Bilateral knee pain. Lower back pain  [de-identified] : Anxiety and depression under control

## 2019-08-25 NOTE — H&P PST ADULT - MUSCULOSKELETAL
Statement Selected details… detailed exam joint swelling/ROM intact/no joint swelling/no joint erythema ROM intact/no calf tenderness/normal strength/no joint warmth/no joint erythema

## 2019-08-27 ENCOUNTER — MED ADMIN CHARGE (OUTPATIENT)
Age: 79
End: 2019-08-27

## 2019-08-27 ENCOUNTER — OUTPATIENT (OUTPATIENT)
Dept: OUTPATIENT SERVICES | Facility: HOSPITAL | Age: 79
LOS: 1 days | End: 2019-08-27

## 2019-08-27 ENCOUNTER — LABORATORY RESULT (OUTPATIENT)
Age: 79
End: 2019-08-27

## 2019-08-27 ENCOUNTER — APPOINTMENT (OUTPATIENT)
Dept: INTERNAL MEDICINE | Facility: CLINIC | Age: 79
End: 2019-08-27

## 2019-08-27 DIAGNOSIS — Z86.69 PERSONAL HISTORY OF OTHER DISEASES OF THE NERVOUS SYSTEM AND SENSE ORGANS: Chronic | ICD-10-CM

## 2019-08-27 DIAGNOSIS — Z90.711 ACQUIRED ABSENCE OF UTERUS WITH REMAINING CERVICAL STUMP: Chronic | ICD-10-CM

## 2019-08-27 RX ORDER — CYANOCOBALAMIN 1000 UG/ML
1000 INJECTION INTRAMUSCULAR; SUBCUTANEOUS
Qty: 0 | Refills: 0 | Status: COMPLETED | OUTPATIENT
Start: 2019-08-23

## 2019-08-27 RX ORDER — CYANOCOBALAMIN 1000 UG/ML
1000 INJECTION INTRAMUSCULAR; SUBCUTANEOUS
Qty: 0 | Refills: 0 | Status: COMPLETED | OUTPATIENT
Start: 2019-08-23 | End: 2019-08-27

## 2019-08-28 ENCOUNTER — APPOINTMENT (OUTPATIENT)
Dept: INTERNAL MEDICINE | Facility: CLINIC | Age: 79
End: 2019-08-28

## 2019-08-28 DIAGNOSIS — E53.8 DEFICIENCY OF OTHER SPECIFIED B GROUP VITAMINS: ICD-10-CM

## 2019-08-31 LAB — METHYLMALONATE SERPL-SCNC: 254 NMOL/L — SIGNIFICANT CHANGE UP (ref 0–378)

## 2019-09-04 RX ORDER — BLOOD SUGAR DIAGNOSTIC
STRIP MISCELLANEOUS
Qty: 2 | Refills: 2 | Status: DISCONTINUED | OUTPATIENT
Start: 2017-11-20 | End: 2019-09-04

## 2019-09-05 ENCOUNTER — MED ADMIN CHARGE (OUTPATIENT)
Age: 79
End: 2019-09-05

## 2019-09-05 ENCOUNTER — APPOINTMENT (OUTPATIENT)
Dept: INTERNAL MEDICINE | Facility: CLINIC | Age: 79
End: 2019-09-05

## 2019-09-05 ENCOUNTER — OUTPATIENT (OUTPATIENT)
Dept: OUTPATIENT SERVICES | Facility: HOSPITAL | Age: 79
LOS: 1 days | End: 2019-09-05

## 2019-09-05 DIAGNOSIS — Z86.69 PERSONAL HISTORY OF OTHER DISEASES OF THE NERVOUS SYSTEM AND SENSE ORGANS: Chronic | ICD-10-CM

## 2019-09-05 DIAGNOSIS — Z90.711 ACQUIRED ABSENCE OF UTERUS WITH REMAINING CERVICAL STUMP: Chronic | ICD-10-CM

## 2019-09-12 ENCOUNTER — APPOINTMENT (OUTPATIENT)
Dept: INTERNAL MEDICINE | Facility: CLINIC | Age: 79
End: 2019-09-12

## 2019-09-12 ENCOUNTER — OUTPATIENT (OUTPATIENT)
Dept: OUTPATIENT SERVICES | Facility: HOSPITAL | Age: 79
LOS: 1 days | End: 2019-09-12

## 2019-09-12 DIAGNOSIS — Z86.69 PERSONAL HISTORY OF OTHER DISEASES OF THE NERVOUS SYSTEM AND SENSE ORGANS: Chronic | ICD-10-CM

## 2019-09-12 DIAGNOSIS — Z90.711 ACQUIRED ABSENCE OF UTERUS WITH REMAINING CERVICAL STUMP: Chronic | ICD-10-CM

## 2019-09-20 ENCOUNTER — OUTPATIENT (OUTPATIENT)
Dept: OUTPATIENT SERVICES | Facility: HOSPITAL | Age: 79
LOS: 1 days | End: 2019-09-20

## 2019-09-20 ENCOUNTER — APPOINTMENT (OUTPATIENT)
Dept: INTERNAL MEDICINE | Facility: CLINIC | Age: 79
End: 2019-09-20

## 2019-09-20 DIAGNOSIS — Z86.69 PERSONAL HISTORY OF OTHER DISEASES OF THE NERVOUS SYSTEM AND SENSE ORGANS: Chronic | ICD-10-CM

## 2019-09-20 DIAGNOSIS — Z90.711 ACQUIRED ABSENCE OF UTERUS WITH REMAINING CERVICAL STUMP: Chronic | ICD-10-CM

## 2019-10-23 ENCOUNTER — OTHER (OUTPATIENT)
Age: 79
End: 2019-10-23

## 2019-10-24 ENCOUNTER — OTHER (OUTPATIENT)
Age: 79
End: 2019-10-24

## 2019-10-25 ENCOUNTER — OTHER (OUTPATIENT)
Age: 79
End: 2019-10-25

## 2019-11-14 ENCOUNTER — RX RENEWAL (OUTPATIENT)
Age: 79
End: 2019-11-14

## 2019-11-22 ENCOUNTER — APPOINTMENT (OUTPATIENT)
Dept: INTERNAL MEDICINE | Facility: CLINIC | Age: 79
End: 2019-11-22
Payer: MEDICARE

## 2020-03-03 ENCOUNTER — LABORATORY RESULT (OUTPATIENT)
Age: 80
End: 2020-03-03

## 2020-03-03 ENCOUNTER — RESULT CHARGE (OUTPATIENT)
Age: 80
End: 2020-03-03

## 2020-03-03 ENCOUNTER — APPOINTMENT (OUTPATIENT)
Dept: INTERNAL MEDICINE | Facility: CLINIC | Age: 80
End: 2020-03-03
Payer: MEDICARE

## 2020-03-03 ENCOUNTER — OUTPATIENT (OUTPATIENT)
Dept: OUTPATIENT SERVICES | Facility: HOSPITAL | Age: 80
LOS: 1 days | End: 2020-03-03

## 2020-03-03 VITALS
WEIGHT: 186 LBS | SYSTOLIC BLOOD PRESSURE: 136 MMHG | OXYGEN SATURATION: 97 % | BODY MASS INDEX: 35.12 KG/M2 | HEIGHT: 61 IN | DIASTOLIC BLOOD PRESSURE: 80 MMHG | HEART RATE: 108 BPM

## 2020-03-03 DIAGNOSIS — Z90.711 ACQUIRED ABSENCE OF UTERUS WITH REMAINING CERVICAL STUMP: Chronic | ICD-10-CM

## 2020-03-03 DIAGNOSIS — E66.9 OBESITY, UNSPECIFIED: ICD-10-CM

## 2020-03-03 DIAGNOSIS — Z87.898 PERSONAL HISTORY OF OTHER SPECIFIED CONDITIONS: ICD-10-CM

## 2020-03-03 DIAGNOSIS — Z86.69 PERSONAL HISTORY OF OTHER DISEASES OF THE NERVOUS SYSTEM AND SENSE ORGANS: Chronic | ICD-10-CM

## 2020-03-03 LAB
BASOPHILS # BLD AUTO: 0.04 K/UL — SIGNIFICANT CHANGE UP (ref 0–0.2)
BASOPHILS NFR BLD AUTO: 0.4 % — SIGNIFICANT CHANGE UP (ref 0–2)
EOSINOPHIL # BLD AUTO: 0.14 K/UL — SIGNIFICANT CHANGE UP (ref 0–0.5)
EOSINOPHIL NFR BLD AUTO: 1.3 % — SIGNIFICANT CHANGE UP (ref 0–6)
HBA1C BLD-MCNC: 10 % — HIGH (ref 4–5.6)
HCT VFR BLD CALC: 43.9 % — SIGNIFICANT CHANGE UP (ref 34.5–45)
HGB BLD-MCNC: 13.8 G/DL — SIGNIFICANT CHANGE UP (ref 11.5–15.5)
IMM GRANULOCYTES NFR BLD AUTO: 0.2 % — SIGNIFICANT CHANGE UP (ref 0–1.5)
LYMPHOCYTES # BLD AUTO: 3.77 K/UL — HIGH (ref 1–3.3)
LYMPHOCYTES # BLD AUTO: 35.8 % — SIGNIFICANT CHANGE UP (ref 13–44)
MCHC RBC-ENTMCNC: 27.5 PG — SIGNIFICANT CHANGE UP (ref 27–34)
MCHC RBC-ENTMCNC: 31.4 % — LOW (ref 32–36)
MCV RBC AUTO: 87.5 FL — SIGNIFICANT CHANGE UP (ref 80–100)
MONOCYTES # BLD AUTO: 0.53 K/UL — SIGNIFICANT CHANGE UP (ref 0–0.9)
MONOCYTES NFR BLD AUTO: 5 % — SIGNIFICANT CHANGE UP (ref 2–14)
NEUTROPHILS # BLD AUTO: 6.02 K/UL — SIGNIFICANT CHANGE UP (ref 1.8–7.4)
NEUTROPHILS NFR BLD AUTO: 57.3 % — SIGNIFICANT CHANGE UP (ref 43–77)
NRBC # FLD: 0 K/UL — SIGNIFICANT CHANGE UP (ref 0–0)
PLATELET # BLD AUTO: 294 K/UL — SIGNIFICANT CHANGE UP (ref 150–400)
PMV BLD: 10.9 FL — SIGNIFICANT CHANGE UP (ref 7–13)
RBC # BLD: 5.02 M/UL — SIGNIFICANT CHANGE UP (ref 3.8–5.2)
RBC # FLD: 14 % — SIGNIFICANT CHANGE UP (ref 10.3–14.5)
T4 FREE SERPL-MCNC: 1.06 NG/DL — SIGNIFICANT CHANGE UP (ref 0.9–1.8)
TSH SERPL-MCNC: 4.1 UIU/ML — SIGNIFICANT CHANGE UP (ref 0.27–4.2)
WBC # BLD: 10.52 K/UL — HIGH (ref 3.8–10.5)
WBC # FLD AUTO: 10.52 K/UL — HIGH (ref 3.8–10.5)

## 2020-03-03 PROCEDURE — 99214 OFFICE O/P EST MOD 30 MIN: CPT | Mod: GC

## 2020-03-04 PROBLEM — Z87.898 HISTORY OF CHEST PAIN: Status: RESOLVED | Noted: 2018-11-02 | Resolved: 2020-03-04

## 2020-03-05 LAB — GLUCOSE BLDC GLUCOMTR-MCNC: 299

## 2020-03-05 NOTE — PHYSICAL EXAM
[No Acute Distress] : no acute distress [PERRL] : pupils equal round and reactive to light [EOMI] : extraocular movements intact [No Respiratory Distress] : no respiratory distress  [No Accessory Muscle Use] : no accessory muscle use [Clear to Auscultation] : lungs were clear to auscultation bilaterally [Normal Rate] : normal rate  [Regular Rhythm] : with a regular rhythm [Normal S1, S2] : normal S1 and S2 [Soft] : abdomen soft [Non Tender] : non-tender [Non-distended] : non-distended [No CVA Tenderness] : no CVA  tenderness [No Spinal Tenderness] : no spinal tenderness [No Focal Deficits] : no focal deficits [de-identified] : 4+/5 LE muscle strength, intact sensation b/l. Unable to assess gait due to fall risk due to unsteady gait

## 2020-03-05 NOTE — REVIEW OF SYSTEMS
[Joint Pain] : joint pain [Fever] : no fever [Chest Pain] : no chest pain [Palpitations] : no palpitations [Abdominal Pain] : no abdominal pain [Nausea] : no nausea [Vomiting] : no vomiting [Dysuria] : no dysuria

## 2020-03-05 NOTE — PLAN
[FreeTextEntry1] : 79F with T2DM (A1C 8.0% on 01/19/19), hypothyroidism, depression with anxiety, osteopenia, vitamin D deficiency, chronic LBP, obesity, ELAINA, chronic diarrhea presenting with c/o unsteady gait. \par \par # Unsteady gait: \par - C/b falls. DDX include neurologic causes. Less likely cardiac- denies chest pain, palpitations, SOB at rest \par - Discussed fall precautions. Patient has HHA\par - PT referral given \par - DEXA scan ordered \par - Neurology referral \par \par # DM2:  (A1C 8.0% on 01/19/19)\par - Last Cr:alb WNL \par - Continue Metformin ER 1000mg. Continue losartan 25, and atorvastatin 40\par - Encouraged patient to exercise and check FS regularly\par - A1C ordered\par \par # Osteopenia with vit D deficiency\par - c/w Ca and vit D supplementation\par - Repeat DEXA ordered\par \par # Chronic diarrhea:\par - Resolved\par - Dx include microscopic colitis, IBS (less likely as patient does not have a history of IBS when she was younger), Metformin induced diarrhea. Likely induced by Metformin given symptoms resolved after switching from Metformin BID to Metformin ER\par - Colonoscopy with random biopsies unremarkable. Patient to follow up with GI\par - Counseled on diet and keeping food logs to help see if any diet precipitates her diarrhea\par \par #. HTN: Controlled\par - Continue Losartan 25\par \par # Hypothyroidism\par - Continue Synthroid 50mcg\par - TSH ordered \par \par # B12 deficiency\par - Rx for B12 supplement sent to pharmacy \par \par # Obesity\par - Counseled on diet. Exercise limited by unsteady gait \par - Weight management referral ordered\par \par #. HCM\par - Vaccinations: UTD, Pneumovax 23 and FLu shot today \par - Mammo:5/19. Mammo referral next visit \par - Colonoscopy: 8/2016 and 02/2019 WNL. \par - DM2 evals: Discussed ophthalmology eye exam. A1C 8.0% on 01/19/19\par - DEXA: UTD with 3/2018 osteopenia. Repeat ordered

## 2020-03-05 NOTE — HISTORY OF PRESENT ILLNESS
[FreeTextEntry1] : Unsteady gait  [de-identified] : 79F with T2DM (A1C 8.0% on 01/19/19), hypothyroidism, depression with anxiety, osteopenia, vitamin D deficiency, chronic LBP, obesity, ELAINA, chronic diarrhea presenting with c/o unsteady gait. She had a witnessed mechanical fall 2 weeks ago when she slipped in the bathroom. She has had 4 falls this year. Last time she saw PT was in September 2019.  She had an MRI hip in July 2019 unremarkable for acute fracture, however significant for severe lumbar spondylosis. Denies numbness/tingling, headache, syncope / pre-syncope.\par \par Per DM2. She has not been checking her FS at home. \par \par Per depression. No SI. States mood "okay" today.\par \par Per chronic LBP. Continue to take gabapentin \par \par Per chronic diarrhea. Resolved per patient. She is yet to make a follow up with GI. \par

## 2020-03-05 NOTE — END OF VISIT
[] : Resident [FreeTextEntry3] : Has had chronic history of unsteady gait.\par Noted to have tremors and cogwheeling on exam, though no shuffling gait.\par Decreased strength in all extremities (lower > upper)\par Would refer to Neuro for workup.\par Agree with additional plan as per Dr. Zepeda.

## 2020-03-06 DIAGNOSIS — E11.40 TYPE 2 DIABETES MELLITUS WITH DIABETIC NEUROPATHY, UNSPECIFIED: ICD-10-CM

## 2020-03-06 DIAGNOSIS — E66.9 OBESITY, UNSPECIFIED: ICD-10-CM

## 2020-03-06 DIAGNOSIS — E53.8 DEFICIENCY OF OTHER SPECIFIED B GROUP VITAMINS: ICD-10-CM

## 2020-03-06 DIAGNOSIS — E03.9 HYPOTHYROIDISM, UNSPECIFIED: ICD-10-CM

## 2020-03-06 DIAGNOSIS — R26.81 UNSTEADINESS ON FEET: ICD-10-CM

## 2020-03-06 DIAGNOSIS — I10 ESSENTIAL (PRIMARY) HYPERTENSION: ICD-10-CM

## 2020-04-27 ENCOUNTER — APPOINTMENT (OUTPATIENT)
Dept: OPHTHALMOLOGY | Facility: CLINIC | Age: 80
End: 2020-04-27

## 2020-05-12 ENCOUNTER — APPOINTMENT (OUTPATIENT)
Dept: INTERNAL MEDICINE | Facility: CLINIC | Age: 80
End: 2020-05-12

## 2020-06-17 ENCOUNTER — APPOINTMENT (OUTPATIENT)
Dept: MAMMOGRAPHY | Facility: IMAGING CENTER | Age: 80
End: 2020-06-17

## 2020-06-17 ENCOUNTER — APPOINTMENT (OUTPATIENT)
Dept: ULTRASOUND IMAGING | Facility: IMAGING CENTER | Age: 80
End: 2020-06-17

## 2020-08-06 ENCOUNTER — RX RENEWAL (OUTPATIENT)
Age: 80
End: 2020-08-06

## 2020-08-14 RX ORDER — WALKER
EACH MISCELLANEOUS
Qty: 1 | Refills: 0 | Status: ACTIVE | OUTPATIENT
Start: 2020-08-04

## 2020-08-18 ENCOUNTER — RX RENEWAL (OUTPATIENT)
Age: 80
End: 2020-08-18

## 2020-08-26 ENCOUNTER — OUTPATIENT (OUTPATIENT)
Dept: OUTPATIENT SERVICES | Facility: HOSPITAL | Age: 80
LOS: 1 days | End: 2020-08-26

## 2020-08-26 ENCOUNTER — APPOINTMENT (OUTPATIENT)
Dept: INTERNAL MEDICINE | Facility: CLINIC | Age: 80
End: 2020-08-26
Payer: MEDICARE

## 2020-08-26 VITALS — WEIGHT: 186 LBS | HEIGHT: 61 IN | BODY MASS INDEX: 35.12 KG/M2

## 2020-08-26 DIAGNOSIS — Z86.69 PERSONAL HISTORY OF OTHER DISEASES OF THE NERVOUS SYSTEM AND SENSE ORGANS: Chronic | ICD-10-CM

## 2020-08-26 DIAGNOSIS — Z90.711 ACQUIRED ABSENCE OF UTERUS WITH REMAINING CERVICAL STUMP: Chronic | ICD-10-CM

## 2020-08-26 DIAGNOSIS — M85.80 OTHER SPECIFIED DISORDERS OF BONE DENSITY AND STRUCTURE, UNSPECIFIED SITE: ICD-10-CM

## 2020-08-26 PROCEDURE — 99214 OFFICE O/P EST MOD 30 MIN: CPT | Mod: GC,95

## 2020-08-27 PROBLEM — M85.80 OSTEOPENIA: Status: ACTIVE | Noted: 2018-04-23

## 2020-08-27 NOTE — ADDENDUM
[FreeTextEntry1] : reviewed with residents via telephone due to covid  19Agree with residents evaluation and plan [email or web address]

## 2020-08-27 NOTE — ASSESSMENT
[FreeTextEntry1] : 79y woman with DM2 (A1c 10% in March 2020), hypothyroidism, depression with anxiety, unsteady gait, here for follow up visit. \par \par # DM2:\par - A1c 10%  in March 2020, pt does not want come to clinic for lab visit, and pt does not measure fingerstick at home \par - discussed with pt, will continue Metformin 1g BID, will add Prandin 1mg TID before meals  \par - Encouraged patient to exercise \par \par # Unsteady gait: \par -  wants a new walker, will fax paperwork  \par \par # Osteopenia with vit D deficiency\par - c/w Ca and vit D supplementation\par \par #. HTN: Controlled\par - Continue Losartan 25\par \par # Hypothyroidism\par - Continue Synthroid 50mcg\par \par #. HCM\par - immunization: UTD \par - Last Mammo:5/19 \par - Colonoscopy: 8/2016 and 02/2019 WNL -> recommend repat in 10 years \par - DEXA: UTD with 3/2018 osteopenia, recommend repeat in 2 years \par \par Case discussed with Dr. Diehl   \par RTC in 3 month for HCM  \par \par Can Hu\par PGY-3 \par [employer]

## 2020-08-27 NOTE — PHYSICAL EXAM
[Alert and Oriented x3] : oriented to person, place, and time [de-identified] : NAD, coherent speech,  speaking in full sentence over the phone

## 2020-08-27 NOTE — REVIEW OF SYSTEMS
[Back Pain] : back pain [Unsteady Walking] : ataxia [Negative] : Psychiatric [Joint Pain] : no joint pain [Joint Stiffness] : no joint stiffness [Muscle Pain] : no muscle pain [Joint Swelling] : no joint swelling [Muscle Weakness] : no muscle weakness

## 2020-08-27 NOTE — HISTORY OF PRESENT ILLNESS
[Home] : at home, [unfilled] , at the time of the visit. [Other Location: e.g. Home (Enter Location, City,State)___] : at [unfilled] [Verbal consent obtained from patient] : the patient, [unfilled] [FreeTextEntry1] : follow up visit  [de-identified] : 79y woman with DM2 (A1c 10% in March 2020), hypothyroidism, depression with anxiety, unsteady gait, here for follow up visit. \par No sick contact or ED/hospitalization during the COVID pandemic. Pt has been staying home with family. Pt does not want come to clinic for lab visit until pandemic is over.  \par \par Unsteady gait: had 4 falls in 2019, she had an MRI hip in July 2019 unremarkable for acute fracture, however significant for severe lumbar spondylosis. Denies fall since July 2019, numbness/tingling, headache, syncope / pre-syncope. Says walker does not work well, and wants a new walker \par \par DM2: does not check her fingerstick, takes metformin 1g BID  \par \par HTN: takes losartan 25mg qD, does not measure BP at home \par \par Depression with anxiety: pt has been taking Effexor 150 ER qD. Per medication review, Effexor dosage has been slowly titrated down, however, pt  started double medication pills to keep the 150mg dosage. Pt also  takes Buspirone PRN as well. No dizziness.

## 2020-08-28 DIAGNOSIS — R26.81 UNSTEADINESS ON FEET: ICD-10-CM

## 2020-08-28 DIAGNOSIS — E03.9 HYPOTHYROIDISM, UNSPECIFIED: ICD-10-CM

## 2020-08-28 DIAGNOSIS — F41.8 OTHER SPECIFIED ANXIETY DISORDERS: ICD-10-CM

## 2020-08-28 DIAGNOSIS — M85.80 OTHER SPECIFIED DISORDERS OF BONE DENSITY AND STRUCTURE, UNSPECIFIED SITE: ICD-10-CM

## 2020-08-28 DIAGNOSIS — I10 ESSENTIAL (PRIMARY) HYPERTENSION: ICD-10-CM

## 2020-08-28 DIAGNOSIS — E11.40 TYPE 2 DIABETES MELLITUS WITH DIABETIC NEUROPATHY, UNSPECIFIED: ICD-10-CM

## 2020-11-16 ENCOUNTER — RX RENEWAL (OUTPATIENT)
Age: 80
End: 2020-11-16

## 2021-01-21 PROCEDURE — 99442: CPT

## 2021-02-16 ENCOUNTER — RX RENEWAL (OUTPATIENT)
Age: 81
End: 2021-02-16

## 2021-03-11 NOTE — PHYSICAL THERAPY INITIAL EVALUATION ADULT - PHYSICAL ASSIST/NONPHYSICAL ASSIST: GAIT, REHAB EVAL
-- DO NOT REPLY / DO NOT REPLY ALL --  -- Message is from the [facility]--    COVID-19 Universal Screening: N/A - Not about scheduling    General Patient Message      Reason for Call:  [facility] pharmacy called to verify the directions for gabapentin (NEURONTIN) 300 MG capsule    Caller Information       Type Contact Phone    03/11/2021 11:20 AM CST Phone (Incoming) [id number] - [place], [place] - [address] (Pharmacy) [phone]          Alternative phone number:     Turnaround time given to caller:   \"This message will be sent to [state Provider's name]. The clinical team will fulfill your request as soon as they review your message.\"     1 person assist/nonverbal cues (demo/gestures)/verbal cues

## 2021-04-07 PROCEDURE — 99443: CPT

## 2021-05-04 PROCEDURE — 99443: CPT | Mod: 95

## 2021-05-14 ENCOUNTER — RX RENEWAL (OUTPATIENT)
Age: 81
End: 2021-05-14

## 2021-06-01 PROCEDURE — 99443: CPT

## 2021-06-02 ENCOUNTER — RESULT REVIEW (OUTPATIENT)
Age: 81
End: 2021-06-02

## 2021-06-02 ENCOUNTER — RESULT CHARGE (OUTPATIENT)
Age: 81
End: 2021-06-02

## 2021-06-02 ENCOUNTER — OUTPATIENT (OUTPATIENT)
Dept: OUTPATIENT SERVICES | Facility: HOSPITAL | Age: 81
LOS: 1 days | End: 2021-06-02

## 2021-06-02 ENCOUNTER — APPOINTMENT (OUTPATIENT)
Dept: INTERNAL MEDICINE | Facility: CLINIC | Age: 81
End: 2021-06-02
Payer: MEDICARE

## 2021-06-02 VITALS — TEMPERATURE: 97.3 F

## 2021-06-02 VITALS — HEIGHT: 61 IN | BODY MASS INDEX: 33.79 KG/M2 | WEIGHT: 179 LBS

## 2021-06-02 VITALS — OXYGEN SATURATION: 98 % | DIASTOLIC BLOOD PRESSURE: 80 MMHG | HEART RATE: 91 BPM | SYSTOLIC BLOOD PRESSURE: 140 MMHG

## 2021-06-02 DIAGNOSIS — F41.8 OTHER SPECIFIED ANXIETY DISORDERS: ICD-10-CM

## 2021-06-02 DIAGNOSIS — I10 ESSENTIAL (PRIMARY) HYPERTENSION: ICD-10-CM

## 2021-06-02 DIAGNOSIS — Z23 ENCOUNTER FOR IMMUNIZATION: ICD-10-CM

## 2021-06-02 DIAGNOSIS — Z00.00 ENCOUNTER FOR GENERAL ADULT MEDICAL EXAMINATION WITHOUT ABNORMAL FINDINGS: ICD-10-CM

## 2021-06-02 DIAGNOSIS — R26.81 UNSTEADINESS ON FEET: ICD-10-CM

## 2021-06-02 DIAGNOSIS — E11.40 TYPE 2 DIABETES MELLITUS WITH DIABETIC NEUROPATHY, UNSPECIFIED: ICD-10-CM

## 2021-06-02 DIAGNOSIS — E03.9 HYPOTHYROIDISM, UNSPECIFIED: ICD-10-CM

## 2021-06-02 DIAGNOSIS — Z90.711 ACQUIRED ABSENCE OF UTERUS WITH REMAINING CERVICAL STUMP: Chronic | ICD-10-CM

## 2021-06-02 DIAGNOSIS — Z86.69 PERSONAL HISTORY OF OTHER DISEASES OF THE NERVOUS SYSTEM AND SENSE ORGANS: Chronic | ICD-10-CM

## 2021-06-02 LAB
ALBUMIN SERPL ELPH-MCNC: 4.4 G/DL — SIGNIFICANT CHANGE UP (ref 3.3–5)
ALP SERPL-CCNC: 100 U/L — SIGNIFICANT CHANGE UP (ref 40–120)
ALT FLD-CCNC: 26 U/L — SIGNIFICANT CHANGE UP (ref 4–33)
ANION GAP SERPL CALC-SCNC: 16 MMOL/L — HIGH (ref 7–14)
AST SERPL-CCNC: 24 U/L — SIGNIFICANT CHANGE UP (ref 4–32)
BASOPHILS # BLD AUTO: 0.06 K/UL — SIGNIFICANT CHANGE UP (ref 0–0.2)
BASOPHILS NFR BLD AUTO: 0.5 % — SIGNIFICANT CHANGE UP (ref 0–2)
BILIRUB SERPL-MCNC: 0.3 MG/DL — SIGNIFICANT CHANGE UP (ref 0.2–1.2)
BUN SERPL-MCNC: 12 MG/DL — SIGNIFICANT CHANGE UP (ref 7–23)
CALCIUM SERPL-MCNC: 10 MG/DL — SIGNIFICANT CHANGE UP (ref 8.4–10.5)
CHLORIDE SERPL-SCNC: 98 MMOL/L — SIGNIFICANT CHANGE UP (ref 98–107)
CHOLEST SERPL-MCNC: 163 MG/DL — SIGNIFICANT CHANGE UP
CO2 SERPL-SCNC: 23 MMOL/L — SIGNIFICANT CHANGE UP (ref 22–31)
CREAT SERPL-MCNC: 0.96 MG/DL — SIGNIFICANT CHANGE UP (ref 0.5–1.3)
EOSINOPHIL # BLD AUTO: 0.15 K/UL — SIGNIFICANT CHANGE UP (ref 0–0.5)
EOSINOPHIL NFR BLD AUTO: 1.3 % — SIGNIFICANT CHANGE UP (ref 0–6)
GLUCOSE BLDC GLUCOMTR-MCNC: 344
GLUCOSE SERPL-MCNC: 379 MG/DL — HIGH (ref 70–99)
HBA1C MFR BLD HPLC: 11.9
HCT VFR BLD CALC: 48.7 % — HIGH (ref 34.5–45)
HDLC SERPL-MCNC: 64 MG/DL — SIGNIFICANT CHANGE UP
HGB BLD-MCNC: 14.6 G/DL — SIGNIFICANT CHANGE UP (ref 11.5–15.5)
IANC: 5.36 K/UL — SIGNIFICANT CHANGE UP (ref 1.5–8.5)
IMM GRANULOCYTES NFR BLD AUTO: 0.2 % — SIGNIFICANT CHANGE UP (ref 0–1.5)
LIPID PNL WITH DIRECT LDL SERPL: 70 MG/DL — SIGNIFICANT CHANGE UP
LYMPHOCYTES # BLD AUTO: 45.3 % — HIGH (ref 13–44)
LYMPHOCYTES # BLD AUTO: 5.12 K/UL — HIGH (ref 1–3.3)
MCHC RBC-ENTMCNC: 26.4 PG — LOW (ref 27–34)
MCHC RBC-ENTMCNC: 30 GM/DL — LOW (ref 32–36)
MCV RBC AUTO: 88.2 FL — SIGNIFICANT CHANGE UP (ref 80–100)
MONOCYTES # BLD AUTO: 0.59 K/UL — SIGNIFICANT CHANGE UP (ref 0–0.9)
MONOCYTES NFR BLD AUTO: 5.2 % — SIGNIFICANT CHANGE UP (ref 2–14)
NEUTROPHILS # BLD AUTO: 5.36 K/UL — SIGNIFICANT CHANGE UP (ref 1.8–7.4)
NEUTROPHILS NFR BLD AUTO: 47.5 % — SIGNIFICANT CHANGE UP (ref 43–77)
NON HDL CHOLESTEROL: 99 MG/DL — SIGNIFICANT CHANGE UP
NRBC # BLD: 0 /100 WBCS — SIGNIFICANT CHANGE UP
NRBC # FLD: 0 K/UL — SIGNIFICANT CHANGE UP
PLATELET # BLD AUTO: 358 K/UL — SIGNIFICANT CHANGE UP (ref 150–400)
POTASSIUM SERPL-MCNC: 5.3 MMOL/L — SIGNIFICANT CHANGE UP (ref 3.5–5.3)
POTASSIUM SERPL-SCNC: 5.3 MMOL/L — SIGNIFICANT CHANGE UP (ref 3.5–5.3)
PROT SERPL-MCNC: 7.9 G/DL — SIGNIFICANT CHANGE UP (ref 6–8.3)
RBC # BLD: 5.52 M/UL — HIGH (ref 3.8–5.2)
RBC # FLD: 13.5 % — SIGNIFICANT CHANGE UP (ref 10.3–14.5)
SODIUM SERPL-SCNC: 137 MMOL/L — SIGNIFICANT CHANGE UP (ref 135–145)
T4 FREE SERPL-MCNC: 1.3 NG/DL — SIGNIFICANT CHANGE UP (ref 0.9–1.8)
T4 FREE+ TSH PNL SERPL: 6.2 UIU/ML — HIGH (ref 0.27–4.2)
TRIGL SERPL-MCNC: 144 MG/DL — SIGNIFICANT CHANGE UP
WBC # BLD: 11.3 K/UL — HIGH (ref 3.8–10.5)
WBC # FLD AUTO: 11.3 K/UL — HIGH (ref 3.8–10.5)

## 2021-06-02 PROCEDURE — G0439: CPT

## 2021-06-02 RX ORDER — REPAGLINIDE 1 MG/1
1 TABLET ORAL 3 TIMES DAILY
Qty: 60 | Refills: 1 | Status: DISCONTINUED | COMMUNITY
Start: 2020-08-27 | End: 2021-06-02

## 2021-06-02 RX ORDER — METFORMIN HYDROCHLORIDE 1000 MG/1
1000 TABLET, FILM COATED, EXTENDED RELEASE ORAL
Qty: 1 | Refills: 3 | Status: DISCONTINUED | COMMUNITY
Start: 2019-01-10 | End: 2021-06-02

## 2021-06-02 NOTE — HEALTH RISK ASSESSMENT
[Fair] :  ~his/her~ mood as fair [Two or more falls in past year] : Patient reported two or more falls in the past year [Independent] : using telephone [Some assistance needed] : managing finances

## 2021-06-02 NOTE — PHYSICAL EXAM
[No Edema] : there was no peripheral edema [Normal] : no rash [Coordination Grossly Intact] : coordination grossly intact [Speech Grossly Normal] : speech grossly normal [Memory Grossly Normal] : memory grossly normal [Normal Affect] : the affect was normal [Alert and Oriented x3] : oriented to person, place, and time [Normal Insight/Judgement] : insight and judgment were intact [None] : no ulcers in either foot were found [] : both feet [de-identified] : diminished discrimination sensation in both feet

## 2021-06-02 NOTE — ASSESSMENT
[FreeTextEntry1] : 80 woman with DM2 (A1c 10% in March 2020), hypothyroidism, depression with anxiety, unsteady gait, here for follow up visit. \par \par # DM2:\par - A1c 10% in March 2020, A1c today 11.9%\par - c/w metformin 1g BID \par - did not take Prandin, will prescribe Empagliflozin 10mg qD, pt is agreeable \par \par # Unsteady gait: \par - has a walker, PT referral for balance training \par \par #. HTN: Controlled\par - Continue Losartan 25\par \par # Hypothyroidism\par - Continue Synthroid\par - will check TSH and T4  \par \par # Depression with anxiety\par - PHQ9 -14\par - continue f/u with psy  \par \par #. HCM\par - immunization: UTD \par - Last Mammo:5/19 \par - Colonoscopy: 8/2016 and 02/2019 WNL -> recommend repat in 10 years \par - DEXA: UTD with 3/2018 osteopenia, recommend repeat in 2 years \par \par Case discussed with Dr. Moreno \par telehealth visit in 5 weeks to make sure pt is taking medications \par \par Can Hu\par PGY-3 \par [employer].

## 2021-06-02 NOTE — HISTORY OF PRESENT ILLNESS
[FreeTextEntry1] : comprehensive visit  [de-identified] : 79y woman with DM2 (A1c 10% in March 2020), hypothyroidism, depression with anxiety, unsteady gait, here for comprehensive visit.  \par \par Unsteady gait: had 4 falls in 2019, she had an MRI hip in July 2019 unremarkable for acute fracture, however significant for severe lumbar spondylosis. Pt walks with a walker, denies numbness/tingling, headache, syncope / pre-syncope. Pt's daughter-in-law says that pt still falls at home, but family member and HHA usually catches the pt, no head injury  \par \par DM2: does not check her fingerstick, takes metformin 1g BID. Last visit pt was prescribed Prandin 1mg TID, however, did not take the medicaiton.  \par \par HTN: takes losartan 25mg qD, does not measure BP at home \par \par Depression with anxiety: managed by Psy, PHQ 14 today, sees Psy every other month  \par

## 2021-06-02 NOTE — REVIEW OF SYSTEMS
[Joint Pain] : joint pain [Muscle Pain] : muscle pain [Back Pain] : back pain [Unsteady Walking] : ataxia [Anxiety] : anxiety [Depression] : depression [Negative] : Integumentary [Headache] : no headache [Dizziness] : no dizziness [Fainting] : no fainting [Confusion] : no confusion [Memory Loss] : no memory loss

## 2021-06-03 LAB
CREAT ?TM UR-MCNC: 151 MG/DL — SIGNIFICANT CHANGE UP
MICROALBUMIN UR-MCNC: 3.6 MG/DL — SIGNIFICANT CHANGE UP
MICROALBUMIN/CREAT UR-RTO: 23 MG/G — SIGNIFICANT CHANGE UP (ref 0–30)

## 2021-07-06 ENCOUNTER — APPOINTMENT (OUTPATIENT)
Dept: INTERNAL MEDICINE | Facility: CLINIC | Age: 81
End: 2021-07-06
Payer: MEDICARE

## 2021-07-06 ENCOUNTER — OUTPATIENT (OUTPATIENT)
Dept: OUTPATIENT SERVICES | Facility: HOSPITAL | Age: 81
LOS: 1 days | End: 2021-07-06

## 2021-07-06 VITALS — WEIGHT: 189 LBS | BODY MASS INDEX: 34.78 KG/M2 | HEIGHT: 62 IN

## 2021-07-06 DIAGNOSIS — Z86.69 PERSONAL HISTORY OF OTHER DISEASES OF THE NERVOUS SYSTEM AND SENSE ORGANS: Chronic | ICD-10-CM

## 2021-07-06 DIAGNOSIS — Z90.711 ACQUIRED ABSENCE OF UTERUS WITH REMAINING CERVICAL STUMP: Chronic | ICD-10-CM

## 2021-07-06 PROCEDURE — 99214 OFFICE O/P EST MOD 30 MIN: CPT | Mod: GC,95

## 2021-07-06 RX ORDER — LIDOCAINE 4 G/100G
4 PATCH TOPICAL
Qty: 2 | Refills: 1 | Status: ACTIVE | COMMUNITY

## 2021-07-06 RX ORDER — WALKER
EACH MISCELLANEOUS
Qty: 1 | Refills: 0 | Status: COMPLETED | OUTPATIENT
Start: 2020-08-04 | End: 2021-07-06

## 2021-07-06 RX ORDER — BUSPIRONE HYDROCHLORIDE 10 MG/1
10 TABLET ORAL
Qty: 30 | Refills: 0 | Status: COMPLETED | COMMUNITY
Start: 2021-07-06

## 2021-07-07 DIAGNOSIS — E03.9 HYPOTHYROIDISM, UNSPECIFIED: ICD-10-CM

## 2021-07-07 DIAGNOSIS — M54.9 DORSALGIA, UNSPECIFIED: ICD-10-CM

## 2021-07-07 DIAGNOSIS — E11.40 TYPE 2 DIABETES MELLITUS WITH DIABETIC NEUROPATHY, UNSPECIFIED: ICD-10-CM

## 2021-07-07 NOTE — HISTORY OF PRESENT ILLNESS
[Home] : at home, [unfilled] , at the time of the visit. [Medical Office: ([facility])___] : at the medical office located in  [Verbal consent obtained from patient] : the patient, [unfilled] [Family Member] : family member [FreeTextEntry1] : follow up for medicine compliance, chronic issues of stable back pain, uncontrolled DM2 and unsteady gait  [de-identified] : 80y F with DM2 (A1c 10% in March 2020), hypothyroidism, depression with anxiety, unsteady gait, on tele appointment for follow up. \par \par #Back pain: Patient states that she has not been feeling well. She has pain in her lower back and travels down her left leg. She describes the pain as sharp shooting pain. Bending over makes it worse. She is only able to stand for a few minutes and then has to sit down to relieve the pain. Patient admits to having frequent falls. She states that she feels very unsteady when she gets up. The back pain has been going on for about a year and has remained the same in nature and strength. It started after a fall where she landed on her left hip. Patient states that she has gotten xrays since without any significant findings.  \par \par  # DM2: Last A1c 10% in March 2020, and A1c in June 11.9%. Patient was told to c/w metformin 1g BID. Per her last visit she was not taking Prandin so she was prescribed Empagliflozin 10mg qD. Today, I spoke with patients son who helps her take her meds. She has been compliant with medications but has not made any changes in her diet. She drinks about 5-6 cups of juice in a day. She does not check her glucose. Patient has been feeling very fatigued lately and sleeps all day. she also complains of blurriness of vision that has progressively worsened. she denies tingling and pain in hands and feet. \par \par # Unsteady gait: Pt has hx of multiple falls. She uses a walker for assistance. PT referral for balance training per last visit but patient has not gone. She denies LOC but states that when she stands up she feels lightheaded. Patient uses a walker to walk but has not beeing walking much due to back pain  \par \par # Hypothyroidism: Pt is on .05 mg of levothyroxine. Per her last labs in June, her TSH is elevated with a normal free T4. However, patient endorses dysregulation in body temperature, feeling fatigue, dizzy and unwell over the past few months.

## 2021-07-07 NOTE — PLAN
[FreeTextEntry1] : 80y F with DM2 (A1c 10% in March 2020), hypothyroidism, depression with anxiety, unsteady gait and chronic back pain on tele appointment for follow up for chronic conditions \par \par - PT referral\par - Optho referral \par - increased levothyroxine to 75, will need recheck in one month\par - recheck a1c in one month \par \par RTC in 1m\par \par Case discussed with Dr. Diehl \par \par \par Ciarra Fung, PGY2

## 2021-07-07 NOTE — REVIEW OF SYSTEMS
[Fatigue] : fatigue [Vision Problems] : vision problems [Back Pain] : back pain [Unsteady Walking] : ataxia [Fever] : no fever [Chills] : no chills [Discharge] : no discharge [Pain] : no pain [Itching] : no itching [Earache] : no earache [Hearing Loss] : no hearing loss [Chest Pain] : no chest pain [Palpitations] : no palpitations [Shortness Of Breath] : no shortness of breath [Abdominal Pain] : no abdominal pain [Nausea] : no nausea [Constipation] : no constipation [Diarrhea] : diarrhea [Dysuria] : no dysuria [Incontinence] : no incontinence [Joint Stiffness] : no joint stiffness [Joint Swelling] : no joint swelling [Itching] : no itching [Skin Rash] : no skin rash [Headache] : no headache [Suicidal] : not suicidal [Insomnia] : no insomnia [Easy Bleeding] : no easy bleeding [Easy Bruising] : no easy bruising

## 2021-07-22 ENCOUNTER — RX RENEWAL (OUTPATIENT)
Age: 81
End: 2021-07-22

## 2021-07-23 NOTE — PATIENT PROFILE ADULT - NSTRANSFERBELONGINGSDISPO_GEN_A_NUR
Surgery Teaching    1. Someone from our scheduling department will call you within the next few days to get you scheduled with your provider for surgery. If no one has called you in one week, please notify us.    2. You must have a physical exam (called  history and physical ) within 30 days of surgery. You may complete this with your primary care provider.   A. If your provider is outside of the [facility] network please have them complete the preoperative forms provided to you in the surgery packet you will be mailed and be sure to have your provider fax them to the appropriate location prior to surgery. For surgery at the [facility] the fax number is:[phone]. For surgery at the [facility] the fax number is [phone].  B. In some cases we may have you see our Preoperative Assessment Center. If we have expressed this to you, our  will set up your appointment with them when they call to set up your surgery.    3. Complete a COVID test 4 days prior to surgery. You will need to have this done regardless of whether you have had the COVID vaccine. If you have the test performed at a clinic outside of the network, you will need to have the test results faxed to us.    4. For same-day surgery, you must arrange for an adult to take you home from the Center. An adult must stay with you for the first 24 hours after surgery. You cannot drive for 24 hours.     5. Ask your doctor what medicines are safe before surgery. For over the counter medications and supplements it is advised that you do NOT TAKE MOTRIN, IBUPROFEN, ASPIRIN, ALEVE, GARLIC SUPPLEMENTS or FISH OIL x 7 days prior to surgery (to prevent excess bleeding and bruising at time of surgery). If your provider advises you to take any medication the morning of surgery you should take this with a sip of water.    6. A few days prior to surgery a nurse will call you to review your health history and instructions for before and after surgery. They will give you your  final arrival time based upon your scheduled arrival time for surgery.    7. Call the surgical team if there's any change in your health prior to surgery. Things you should call for include but are not limited to signs of a cold or the flu (sore throat, runny nose, cough, rash, fever). Other things to notify them for is for any open wounds (cuts, scrapes, scratches) near to the surgery site.    8. If you drink alcohol, stop drinking alcohol at least 24 hours before surgery.    9. If you smoke, stop or at least cut down on smoking 24 hours before surgery.    10.Take a bath or shower the night before and the morning of surgery (as told by your surgeon). Use an antiseptic soap. If your doctor does not give you special soap, buy Hibiclens or Debra-Stat at the drug store or ask the pharmacist to suggest a brand. You will wash with this from the neck down, washing your hair and face as you would normally.   A. When you are done with your shower please be sure to use clean towels to dry with, have clean linens on your bed, and put on clean clothes each time.   B. DO NOT put on lotion, powder, perfume, deodorant or make-up after bathing.    11. You can eat a normal meal the night before surgery. Do not eat any solid foods or drink any milk products for 8 hours before surgery.     12. You may drink clear liquids until 2 hours before surgery. Clear liquids include water, Gatorade, apple juice and liquids you can see through.    13. No eating or drinking 2 hours prior to surgery until after surgery. Your post op team will review any diet limitations you might have and when you can start eating and drinking again after surgery.        1. You were seen in the clinic today by Dr. Dominguez.    2.   It is recommended that you proceed with a Drug Induced Sleep Endoscopy.    3.   Dr. Dominguez will follow up with you the week after surgery to review results for your sleep study.    If you have any questions or concerns after your appointment,  please call the clinic.    -Clinic phone [phone]. Option #1 for scheduling related needs. Option #3 for nurse advice.   -Direct phone [phone]    Hilda Campos LPN  [facility]  Department of Otolaryngology     with patient

## 2021-08-16 ENCOUNTER — RX RENEWAL (OUTPATIENT)
Age: 81
End: 2021-08-16

## 2021-08-18 ENCOUNTER — RX RENEWAL (OUTPATIENT)
Age: 81
End: 2021-08-18

## 2021-09-02 PROCEDURE — 99443: CPT

## 2021-09-30 PROCEDURE — 99443: CPT

## 2021-11-01 ENCOUNTER — RX RENEWAL (OUTPATIENT)
Age: 81
End: 2021-11-01

## 2021-11-03 PROCEDURE — 99443: CPT

## 2021-11-30 PROCEDURE — 99443: CPT

## 2021-12-13 ENCOUNTER — NON-APPOINTMENT (OUTPATIENT)
Age: 81
End: 2021-12-13

## 2022-01-25 PROCEDURE — 99443: CPT

## 2022-03-09 ENCOUNTER — NON-APPOINTMENT (OUTPATIENT)
Age: 82
End: 2022-03-09

## 2022-03-10 ENCOUNTER — NON-APPOINTMENT (OUTPATIENT)
Age: 82
End: 2022-03-10

## 2022-03-14 ENCOUNTER — APPOINTMENT (OUTPATIENT)
Dept: INTERNAL MEDICINE | Facility: CLINIC | Age: 82
End: 2022-03-14

## 2022-03-14 ENCOUNTER — APPOINTMENT (OUTPATIENT)
Dept: INTERNAL MEDICINE | Facility: CLINIC | Age: 82
End: 2022-03-14
Payer: MEDICARE

## 2022-03-14 ENCOUNTER — OUTPATIENT (OUTPATIENT)
Dept: OUTPATIENT SERVICES | Facility: HOSPITAL | Age: 82
LOS: 1 days | End: 2022-03-14

## 2022-03-14 VITALS — HEIGHT: 62 IN

## 2022-03-14 DIAGNOSIS — Z86.69 PERSONAL HISTORY OF OTHER DISEASES OF THE NERVOUS SYSTEM AND SENSE ORGANS: Chronic | ICD-10-CM

## 2022-03-14 DIAGNOSIS — Z90.711 ACQUIRED ABSENCE OF UTERUS WITH REMAINING CERVICAL STUMP: Chronic | ICD-10-CM

## 2022-03-14 DIAGNOSIS — M47.816 SPONDYLOSIS W/OUT MYELOPATHY OR RADICULOPATHY, LUMBAR REGION: ICD-10-CM

## 2022-03-14 PROCEDURE — ZZZZZ: CPT

## 2022-03-21 DIAGNOSIS — M47.816 SPONDYLOSIS WITHOUT MYELOPATHY OR RADICULOPATHY, LUMBAR REGION: ICD-10-CM

## 2022-03-21 NOTE — ASSESSMENT
[FreeTextEntry1] : Patient would greatly benefit from Home Services, sent referrals hopefully including enrollment in a home visit program\par  \par RTC if physically able to in person, otherwise Telephonic call in 3 months\par Carlos Woo PGY2 \par D/w Dr. Louis

## 2022-03-21 NOTE — HISTORY OF PRESENT ILLNESS
[FreeTextEntry1] : 80y F with DM2 (A1c 11.9% in June 2021), hypothyroidism, depression with anxiety, lumbar spondylosis here for f/u. \par  [de-identified] : 80y F with DM2 (A1c 11.9% in June 2021), hypothyroidism, depression with anxiety, severe lumbar spondylosis here for f/u. \par 15 minutes spent on the phone\par Called Patient because of last note on 3/10 in which reported that patient fell and could not walk. To clarify, patient was called to set up an appointment because she had not been seen in person for about a year. Patient refused to come to an appointment and stated that she cannot walk which prompted a call from a provider. Miscommunication occurred in which the provider thought that patient had fallen a couple days prior and because of that she could not walk and would not go to the ER. However, per patient and son, patient did not fall. The last fall she had was in 2019 in which she had an MRI of her left hip which did not show any fractures but did show severe lumbar spondylosis. Since then, she now does not have any pain while sitting but does have pain while trying to stand. However, this pain has been present for the last three years and it has not changes in severity, frequency, or length. When asked why she did not want to move she said that she just didn't want to deal with the pain anymore and she is comfortable while sitting. Per son she has not left the house in over a year because of the pain and the steps to the house. However per son there is no change in her mentation or physical capabilities. They use gabapentin and ibuprofen but it does not help nor does tylenol.

## 2022-03-21 NOTE — REVIEW OF SYSTEMS
[Shortness Of Breath] : no shortness of breath [Wheezing] : no wheezing [Cough] : no cough [Dyspnea on Exertion] : no dyspnea on exertion [Joint Stiffness] : no joint stiffness [Joint Swelling] : no joint swelling [Muscle Weakness] : no muscle weakness

## 2022-03-21 NOTE — END OF VISIT
[] : Resident [FreeTextEntry3] : Patient with decreased functionality 2/2 pain. Patient would benefit from increased home services to prevent deconditioning such as home PT and Home Doctor Visits as patient refuses to leave the house. Patient may also warrant PM&R/Ortho follow up to assess for injections vs surgery as possible options for the pain

## 2022-03-28 PROCEDURE — 99443: CPT

## 2022-04-25 PROCEDURE — 99443: CPT

## 2022-06-10 ENCOUNTER — NON-APPOINTMENT (OUTPATIENT)
Age: 82
End: 2022-06-10

## 2022-06-22 ENCOUNTER — APPOINTMENT (OUTPATIENT)
Dept: INTERNAL MEDICINE | Facility: CLINIC | Age: 82
End: 2022-06-22

## 2022-06-22 ENCOUNTER — OUTPATIENT (OUTPATIENT)
Dept: OUTPATIENT SERVICES | Facility: HOSPITAL | Age: 82
LOS: 1 days | End: 2022-06-22

## 2022-06-22 VITALS
BODY MASS INDEX: 28.89 KG/M2 | HEIGHT: 62 IN | TEMPERATURE: 98 F | DIASTOLIC BLOOD PRESSURE: 76 MMHG | OXYGEN SATURATION: 98 % | WEIGHT: 157 LBS | HEART RATE: 95 BPM | SYSTOLIC BLOOD PRESSURE: 128 MMHG

## 2022-06-22 DIAGNOSIS — Z86.69 PERSONAL HISTORY OF OTHER DISEASES OF THE NERVOUS SYSTEM AND SENSE ORGANS: Chronic | ICD-10-CM

## 2022-06-22 DIAGNOSIS — Z90.711 ACQUIRED ABSENCE OF UTERUS WITH REMAINING CERVICAL STUMP: Chronic | ICD-10-CM

## 2022-06-22 PROCEDURE — 99214 OFFICE O/P EST MOD 30 MIN: CPT | Mod: GC

## 2022-06-22 RX ORDER — ACETAMINOPHEN 500 MG/1
500 TABLET ORAL EVERY 8 HOURS
Qty: 1 | Refills: 1 | Status: DISCONTINUED | COMMUNITY
Start: 2019-08-21 | End: 2022-06-22

## 2022-06-22 RX ORDER — DICLOFENAC SODIUM 3 G/100G
3 GEL TOPICAL TWICE DAILY
Qty: 2 | Refills: 2 | Status: DISCONTINUED | COMMUNITY
Start: 2021-07-06 | End: 2022-06-22

## 2022-06-22 RX ORDER — ADHESIVE TAPE 3"X 2.3 YD
50 MCG TAPE, NON-MEDICATED TOPICAL
Qty: 90 | Refills: 2 | Status: DISCONTINUED | COMMUNITY
Start: 2018-02-06 | End: 2022-06-22

## 2022-06-24 ENCOUNTER — TRANSCRIPTION ENCOUNTER (OUTPATIENT)
Age: 82
End: 2022-06-24

## 2022-06-24 LAB
ALBUMIN SERPL ELPH-MCNC: 4.4 G/DL
ALP BLD-CCNC: 88 U/L
ALT SERPL-CCNC: 8 U/L
ANION GAP SERPL CALC-SCNC: 15 MMOL/L
AST SERPL-CCNC: 15 U/L
BILIRUB SERPL-MCNC: 0.4 MG/DL
BUN SERPL-MCNC: 9 MG/DL
CALCIUM SERPL-MCNC: 9.7 MG/DL
CHLORIDE SERPL-SCNC: 100 MMOL/L
CO2 SERPL-SCNC: 25 MMOL/L
CREAT SERPL-MCNC: 0.8 MG/DL
CREAT SPEC-SCNC: 62 MG/DL
EGFR: 75 ML/MIN/1.73M2
GLUCOSE SERPL-MCNC: 195 MG/DL
HBA1C MFR BLD HPLC: 9.7
MICROALBUMIN 24H UR DL<=1MG/L-MCNC: <1.2 MG/DL
MICROALBUMIN/CREAT 24H UR-RTO: NORMAL MG/G
POTASSIUM SERPL-SCNC: 3.8 MMOL/L
PROT SERPL-MCNC: 7.2 G/DL
SODIUM SERPL-SCNC: 140 MMOL/L
T4 FREE SERPL-MCNC: 1.4 NG/DL
TSH SERPL-ACNC: 1.83 UIU/ML

## 2022-06-27 DIAGNOSIS — E11.40 TYPE 2 DIABETES MELLITUS WITH DIABETIC NEUROPATHY, UNSPECIFIED: ICD-10-CM

## 2022-06-27 DIAGNOSIS — Z00.00 ENCOUNTER FOR GENERAL ADULT MEDICAL EXAMINATION WITHOUT ABNORMAL FINDINGS: ICD-10-CM

## 2022-06-27 DIAGNOSIS — E03.9 HYPOTHYROIDISM, UNSPECIFIED: ICD-10-CM

## 2022-06-27 DIAGNOSIS — F41.9 ANXIETY DISORDER, UNSPECIFIED: ICD-10-CM

## 2022-06-27 DIAGNOSIS — M54.9 DORSALGIA, UNSPECIFIED: ICD-10-CM

## 2022-06-27 NOTE — HISTORY OF PRESENT ILLNESS
[FreeTextEntry1] : 80y F with DM2 (A1c 11.9 in June 2021), hypothyroidism, depression with anxiety, unsteady gait, presenting for f/u chronic conditions.  [de-identified] : 80y F with DM2 (A1c 11.9 in June 2021), hypothyroidism, depression with anxiety, unsteady gait, presenting for f/u chronic conditions. \par \par DM: Patient takes metformin 1000mg BID and Jardiance 10mg qd. Does not check blood glucose at home; reports good adherence with medications. Patient is due for podiatry and ophthalmology appointments. \par \par Hypothyroid: denies heat/cold intolerance, skin changes, dry skin, palpitations, constipation, diarrhea. Takes levothyroxine 75mcg as prescribed. \par \par Back pain: Patient has R-sided chronic back pain, for which she takes gabapentin 200mg at night, which also helps her sleep. Also uses lidocaine patches which help intermittently. Describes the pain as sharp and excruciating; denies urinary, bowel incontinence. Per patient's daughter in law, she sits on the couch most of the day and has become deconditioned. She does not leave the house - has not for 1 year [place] [facility] [place] \par \par Depression: Patient currently takes Venlafaxine 225mg qd; follows with a psychiatrist with whom she has televisits every 3 months for medication management. PHQ-9 at this visit is 13, indicating moderate depression. Denies suicidal ideation, plan.

## 2022-06-27 NOTE — PHYSICAL EXAM
[Normal] : soft, non-tender, non-distended, no masses palpated, no HSM and normal bowel sounds [No Spinal Tenderness] : no spinal tenderness [No Joint Swelling] : no joint swelling [No Rash] : no rash [No Focal Deficits] : no focal deficits [Normal Affect] : the affect was normal [Normal Insight/Judgement] : insight and judgment were intact [de-identified] : L-sided paraspinal muscle tenderness

## 2022-06-27 NOTE — REVIEW OF SYSTEMS
[Fatigue] : fatigue [Recent Change In Weight] : ~T recent weight change [Joint Stiffness] : joint stiffness [Muscle Weakness] : muscle weakness [Back Pain] : back pain [Negative] : Psychiatric [Fever] : no fever [Hot Flashes] : no hot flashes [Night Sweats] : no night sweats [Joint Pain] : no joint pain [Joint Swelling] : no joint swelling [Muscle Pain] : no muscle pain [FreeTextEntry2] : unintended weight loss

## 2022-06-27 NOTE — ASSESSMENT
[FreeTextEntry1] : DM: \par - continue metformin 1000mg BID\par - increase Jardiance 10mg --> 25mg qd \par - A1c on this visit is 9.7\par - patient declines to use insulin; is amenable to use Freestyle Yoanna however does not use insulin 4x daily, and therefore it will not be covered\par - encouraged to see podiatry/ophthalmology\par \par Hypothyroidism: \par - TSH 1.8, FT4 1.4; denies symptoms of hypothyroid\par - continue levothyroxine 75mcg qd\par \par Depression: \par - continue venlaflaxine, psychiatry f/u\par - given behavioral health referral\par \par Back pain: most likely muscular in origin, given physical exam findings w paraspinal tenderness, no midline tenderness, made worse in the setting of prolonged immobility and deconditioning. \par - continue lidocaine patch PRN\par - continue gabapentin: can increase to TID dosing, starting with 100mg AM and afternoon, and continuing with 200mg in PM \par - will give PT referral; will try for home PT as patient will not leave the house for outpatient PT\par \par RTC 6 weeks for back pain, DM follow up given higher dose of Jardiance. \par Case discussed with Dr. Mccormick\par \par

## 2022-06-28 NOTE — H&P PST ADULT - PROBLEM SELECTOR PROBLEM 2
Noted thickened toenail.  Patient's had oral terbinafine in the past to treat this.  Recent LFTs within normal limits.  We will start terbinafine and plan on rechecking liver enzymes in 6 weeks.   Sleep apnea

## 2022-07-01 PROCEDURE — 99443: CPT

## 2022-07-08 ENCOUNTER — TRANSCRIPTION ENCOUNTER (OUTPATIENT)
Age: 82
End: 2022-07-08

## 2022-08-12 PROCEDURE — 99443: CPT

## 2022-09-21 PROCEDURE — 99443: CPT

## 2022-12-16 ENCOUNTER — INPATIENT (INPATIENT)
Facility: HOSPITAL | Age: 82
LOS: 11 days | Discharge: NURSING, REHAB OR LONG-TERM CARE | End: 2022-12-28
Attending: INTERNAL MEDICINE | Admitting: INTERNAL MEDICINE
Payer: MEDICARE

## 2022-12-16 VITALS
TEMPERATURE: 99 F | WEIGHT: 145.06 LBS | SYSTOLIC BLOOD PRESSURE: 127 MMHG | RESPIRATION RATE: 17 BRPM | DIASTOLIC BLOOD PRESSURE: 78 MMHG | HEART RATE: 93 BPM | OXYGEN SATURATION: 98 % | HEIGHT: 66 IN

## 2022-12-16 DIAGNOSIS — Z90.711 ACQUIRED ABSENCE OF UTERUS WITH REMAINING CERVICAL STUMP: Chronic | ICD-10-CM

## 2022-12-16 DIAGNOSIS — Z86.69 PERSONAL HISTORY OF OTHER DISEASES OF THE NERVOUS SYSTEM AND SENSE ORGANS: Chronic | ICD-10-CM

## 2022-12-16 PROCEDURE — 99285 EMERGENCY DEPT VISIT HI MDM: CPT

## 2022-12-16 NOTE — ED ADULT TRIAGE NOTE - CHIEF COMPLAINT QUOTE
hx of IBD , HTN, panic disorder S/P fall while going to bathroom PW LB and L hip pain. denies LOC. hx of IBS , HTN, panic disorder S/P fall while going to bathroom PW LB and L hip pain. denies LOC.

## 2022-12-16 NOTE — ED ADULT TRIAGE NOTE - BMI (KG/M2)
23.4 Itraconazole Counseling:  I discussed with the patient the risks of itraconazole including but not limited to liver damage, nausea/vomiting, neuropathy, and severe allergy.  The patient understands that this medication is best absorbed when taken with acidic beverages such as non-diet cola or ginger ale.  The patient understands that monitoring is required including baseline LFTs and repeat LFTs at intervals.  The patient understands that they are to contact us or the primary physician if concerning signs are noted.

## 2022-12-17 LAB
ALBUMIN SERPL ELPH-MCNC: 3.2 G/DL — LOW (ref 3.3–5)
ALP SERPL-CCNC: 63 U/L — SIGNIFICANT CHANGE UP (ref 40–120)
ALT FLD-CCNC: 20 U/L — SIGNIFICANT CHANGE UP (ref 12–78)
ANION GAP SERPL CALC-SCNC: 5 MMOL/L — SIGNIFICANT CHANGE UP (ref 5–17)
AST SERPL-CCNC: 59 U/L — HIGH (ref 15–37)
BASOPHILS # BLD AUTO: 0.04 K/UL — SIGNIFICANT CHANGE UP (ref 0–0.2)
BASOPHILS NFR BLD AUTO: 0.7 % — SIGNIFICANT CHANGE UP (ref 0–2)
BILIRUB SERPL-MCNC: 0.3 MG/DL — SIGNIFICANT CHANGE UP (ref 0.2–1.2)
BUN SERPL-MCNC: 11 MG/DL — SIGNIFICANT CHANGE UP (ref 7–23)
CALCIUM SERPL-MCNC: 9.3 MG/DL — SIGNIFICANT CHANGE UP (ref 8.5–10.1)
CHLORIDE SERPL-SCNC: 104 MMOL/L — SIGNIFICANT CHANGE UP (ref 96–108)
CO2 SERPL-SCNC: 28 MMOL/L — SIGNIFICANT CHANGE UP (ref 22–31)
CREAT SERPL-MCNC: 0.81 MG/DL — SIGNIFICANT CHANGE UP (ref 0.5–1.3)
EGFR: 73 ML/MIN/1.73M2 — SIGNIFICANT CHANGE UP
EOSINOPHIL # BLD AUTO: 0.01 K/UL — SIGNIFICANT CHANGE UP (ref 0–0.5)
EOSINOPHIL NFR BLD AUTO: 0.2 % — SIGNIFICANT CHANGE UP (ref 0–6)
FLUAV AG NPH QL: SIGNIFICANT CHANGE UP
FLUBV AG NPH QL: SIGNIFICANT CHANGE UP
GLUCOSE BLDC GLUCOMTR-MCNC: 119 MG/DL — HIGH (ref 70–99)
GLUCOSE BLDC GLUCOMTR-MCNC: 177 MG/DL — HIGH (ref 70–99)
GLUCOSE BLDC GLUCOMTR-MCNC: 207 MG/DL — HIGH (ref 70–99)
GLUCOSE SERPL-MCNC: 230 MG/DL — HIGH (ref 70–99)
HCT VFR BLD CALC: 40.8 % — SIGNIFICANT CHANGE UP (ref 34.5–45)
HGB BLD-MCNC: 12.9 G/DL — SIGNIFICANT CHANGE UP (ref 11.5–15.5)
IMM GRANULOCYTES NFR BLD AUTO: 0.2 % — SIGNIFICANT CHANGE UP (ref 0–0.9)
LYMPHOCYTES # BLD AUTO: 1.36 K/UL — SIGNIFICANT CHANGE UP (ref 1–3.3)
LYMPHOCYTES # BLD AUTO: 24.2 % — SIGNIFICANT CHANGE UP (ref 13–44)
MCHC RBC-ENTMCNC: 26.7 PG — LOW (ref 27–34)
MCHC RBC-ENTMCNC: 31.6 G/DL — LOW (ref 32–36)
MCV RBC AUTO: 84.3 FL — SIGNIFICANT CHANGE UP (ref 80–100)
MONOCYTES # BLD AUTO: 0.45 K/UL — SIGNIFICANT CHANGE UP (ref 0–0.9)
MONOCYTES NFR BLD AUTO: 8 % — SIGNIFICANT CHANGE UP (ref 2–14)
NEUTROPHILS # BLD AUTO: 3.74 K/UL — SIGNIFICANT CHANGE UP (ref 1.8–7.4)
NEUTROPHILS NFR BLD AUTO: 66.7 % — SIGNIFICANT CHANGE UP (ref 43–77)
NRBC # BLD: 0 /100 WBCS — SIGNIFICANT CHANGE UP (ref 0–0)
PLATELET # BLD AUTO: 216 K/UL — SIGNIFICANT CHANGE UP (ref 150–400)
POTASSIUM SERPL-MCNC: 4.1 MMOL/L — SIGNIFICANT CHANGE UP (ref 3.5–5.3)
POTASSIUM SERPL-SCNC: 4.1 MMOL/L — SIGNIFICANT CHANGE UP (ref 3.5–5.3)
PROT SERPL-MCNC: 6.7 GM/DL — SIGNIFICANT CHANGE UP (ref 6–8.3)
RBC # BLD: 4.84 M/UL — SIGNIFICANT CHANGE UP (ref 3.8–5.2)
RBC # FLD: 13.8 % — SIGNIFICANT CHANGE UP (ref 10.3–14.5)
SARS-COV-2 RNA SPEC QL NAA+PROBE: DETECTED
SODIUM SERPL-SCNC: 137 MMOL/L — SIGNIFICANT CHANGE UP (ref 135–145)
WBC # BLD: 5.61 K/UL — SIGNIFICANT CHANGE UP (ref 3.8–10.5)
WBC # FLD AUTO: 5.61 K/UL — SIGNIFICANT CHANGE UP (ref 3.8–10.5)

## 2022-12-17 PROCEDURE — 73552 X-RAY EXAM OF FEMUR 2/>: CPT | Mod: 26,LT

## 2022-12-17 PROCEDURE — 99222 1ST HOSP IP/OBS MODERATE 55: CPT

## 2022-12-17 PROCEDURE — 73502 X-RAY EXAM HIP UNI 2-3 VIEWS: CPT | Mod: 26,LT

## 2022-12-17 PROCEDURE — 71045 X-RAY EXAM CHEST 1 VIEW: CPT | Mod: 26

## 2022-12-17 PROCEDURE — 72192 CT PELVIS W/O DYE: CPT | Mod: 26,MA

## 2022-12-17 PROCEDURE — 70450 CT HEAD/BRAIN W/O DYE: CPT | Mod: 26,MA

## 2022-12-17 RX ORDER — ENOXAPARIN SODIUM 100 MG/ML
40 INJECTION SUBCUTANEOUS EVERY 24 HOURS
Refills: 0 | Status: DISCONTINUED | OUTPATIENT
Start: 2022-12-17 | End: 2022-12-28

## 2022-12-17 RX ORDER — DEXTROSE 50 % IN WATER 50 %
15 SYRINGE (ML) INTRAVENOUS ONCE
Refills: 0 | Status: DISCONTINUED | OUTPATIENT
Start: 2022-12-17 | End: 2022-12-28

## 2022-12-17 RX ORDER — SENNA PLUS 8.6 MG/1
2 TABLET ORAL AT BEDTIME
Refills: 0 | Status: DISCONTINUED | OUTPATIENT
Start: 2022-12-17 | End: 2022-12-28

## 2022-12-17 RX ORDER — SODIUM CHLORIDE 9 MG/ML
1000 INJECTION, SOLUTION INTRAVENOUS
Refills: 0 | Status: DISCONTINUED | OUTPATIENT
Start: 2022-12-17 | End: 2022-12-28

## 2022-12-17 RX ORDER — VENLAFAXINE HCL 75 MG
150 CAPSULE, EXT RELEASE 24 HR ORAL DAILY
Refills: 0 | Status: DISCONTINUED | OUTPATIENT
Start: 2022-12-17 | End: 2022-12-28

## 2022-12-17 RX ORDER — INSULIN GLARGINE 100 [IU]/ML
4 INJECTION, SOLUTION SUBCUTANEOUS AT BEDTIME
Refills: 0 | Status: DISCONTINUED | OUTPATIENT
Start: 2022-12-17 | End: 2022-12-28

## 2022-12-17 RX ORDER — DEXTROSE 50 % IN WATER 50 %
25 SYRINGE (ML) INTRAVENOUS ONCE
Refills: 0 | Status: DISCONTINUED | OUTPATIENT
Start: 2022-12-17 | End: 2022-12-28

## 2022-12-17 RX ORDER — GLUCAGON INJECTION, SOLUTION 0.5 MG/.1ML
1 INJECTION, SOLUTION SUBCUTANEOUS ONCE
Refills: 0 | Status: DISCONTINUED | OUTPATIENT
Start: 2022-12-17 | End: 2022-12-28

## 2022-12-17 RX ORDER — INSULIN LISPRO 100/ML
VIAL (ML) SUBCUTANEOUS
Refills: 0 | Status: DISCONTINUED | OUTPATIENT
Start: 2022-12-17 | End: 2022-12-28

## 2022-12-17 RX ORDER — DEXTROSE 50 % IN WATER 50 %
12.5 SYRINGE (ML) INTRAVENOUS ONCE
Refills: 0 | Status: DISCONTINUED | OUTPATIENT
Start: 2022-12-17 | End: 2022-12-28

## 2022-12-17 RX ORDER — LEVOTHYROXINE SODIUM 125 MCG
50 TABLET ORAL DAILY
Refills: 0 | Status: DISCONTINUED | OUTPATIENT
Start: 2022-12-18 | End: 2022-12-28

## 2022-12-17 RX ORDER — ACETAMINOPHEN 500 MG
650 TABLET ORAL EVERY 6 HOURS
Refills: 0 | Status: DISCONTINUED | OUTPATIENT
Start: 2022-12-17 | End: 2022-12-28

## 2022-12-17 RX ORDER — SODIUM CHLORIDE 9 MG/ML
1000 INJECTION INTRAMUSCULAR; INTRAVENOUS; SUBCUTANEOUS
Refills: 0 | Status: DISCONTINUED | OUTPATIENT
Start: 2022-12-17 | End: 2022-12-24

## 2022-12-17 RX ADMIN — Medication 150 MILLIGRAM(S): at 12:05

## 2022-12-17 RX ADMIN — Medication 650 MILLIGRAM(S): at 10:27

## 2022-12-17 RX ADMIN — Medication 10 MILLIGRAM(S): at 22:10

## 2022-12-17 RX ADMIN — Medication 2: at 12:06

## 2022-12-17 RX ADMIN — Medication 10 MILLIGRAM(S): at 13:12

## 2022-12-17 RX ADMIN — SODIUM CHLORIDE 70 MILLILITER(S): 9 INJECTION INTRAMUSCULAR; INTRAVENOUS; SUBCUTANEOUS at 12:07

## 2022-12-17 RX ADMIN — Medication 650 MILLIGRAM(S): at 09:39

## 2022-12-17 RX ADMIN — ENOXAPARIN SODIUM 40 MILLIGRAM(S): 100 INJECTION SUBCUTANEOUS at 12:04

## 2022-12-17 RX ADMIN — SENNA PLUS 2 TABLET(S): 8.6 TABLET ORAL at 22:10

## 2022-12-17 NOTE — H&P ADULT - NSHPPHYSICALEXAM_GEN_ALL_CORE
PHYSICAL EXAMINATION:  Vital Signs Last 24 Hrs  T(C): 37.2 (17 Dec 2022 12:07), Max: 37.6 (17 Dec 2022 07:54)  T(F): 98.9 (17 Dec 2022 12:07), Max: 99.6 (17 Dec 2022 07:54)  HR: 87 (17 Dec 2022 12:07) (80 - 93)  BP: 114/70 (17 Dec 2022 12:07) (114/70 - 140/83)  BP(mean): --  RR: 18 (17 Dec 2022 12:07) (17 - 18)  SpO2: 93% (17 Dec 2022 12:07) (93% - 99%)    Parameters below as of 17 Dec 2022 12:07  Patient On (Oxygen Delivery Method): room air      CAPILLARY BLOOD GLUCOSE      POCT Blood Glucose.: 207 mg/dL (17 Dec 2022 11:57)        GENERAL: NAD, well-groomed,  HEAD:  atraumatic, normocephalic  EYES: sclera anicteric  ENMT: mucous membranes moist  NECK: supple, No JVD  edentulous,  hence  angle of  mouth is not  symmetrical  CHEST/LUNG: clear to auscultation bilaterally;    no      rales   ,   no rhonchi,   HEART: normal S1, S2  ABDOMEN: BS+, soft, ND, NT   EXTREMITIES:    no    edema    b/l LEs  NEURO: awake, ,     moves all extremities  SKIN: no     rash

## 2022-12-17 NOTE — ED ADULT NURSE NOTE - OBJECTIVE STATEMENT
Pt alert and verbally responsive. Pt s/p fall tried to go to the bathroom C/O Left hip pain at 8/10. Pt denies loc or hit her head, no Dizziness numbness or sob.. No blood thinner.

## 2022-12-17 NOTE — PATIENT PROFILE ADULT - FUNCTIONAL ASSESSMENT - BASIC MOBILITY 6.
2-calculated by average/Not able to assess (calculate score using [facility] averaging method)

## 2022-12-17 NOTE — ED PROVIDER NOTE - CLINICAL SUMMARY MEDICAL DECISION MAKING FREE TEXT BOX
83 y/o F presenting to the ED s/p fall with L hip pain. Vitals stable. Patient is well appearing in NAD.  Plan-  XR of L hip pain and pelvis  CT pelvis to r/o fracture  Ambulatory trial

## 2022-12-17 NOTE — ED PROVIDER NOTE - NS ED MD DISPO SPECIAL CONSIDERATION1
ED Provider Note    HPI: Patient is a 13-month-old female who presented to the emergency department in care of her parents December 14, 2019 at 9:03 PM with a chief complaint of fever cough and congestion.    Parents also complained of some wheezing but this was not noted by the triage nurse or by myself.  Symptoms began several days ago.  The child is taking fluids well but has diminished appetite.  Urine output is been good.  They do not believe the child's mental status is abnormal and they have seen no change in bladder or bowel habits.  Child has not displayed any ear pulling or ear drainage.  No new rash or lesion on the child's body.  No other somatic complaint    Review of Systems: Positive fever cough congestion negative for decreased fluid intake or urine output change in mental status change in bladder or bowel habits ear pulling ear drainage rash.    Past medical/surgical history: None    Medications: Tylenol    Allergies: None    Social History: Patient lives at home with family immunization status up-to-date      Physical exam: Constitutional: Well-developed well-nourished child awake alert active  Vital signs: Temperature one 101.3 pulse 148 respirations 36 blood pressure 131/92 pulse oximetry 96%  EYES: PERRL, EOMI, Conjunctivae and sclera normal, eyelids normal bilaterally.  Neck: Trachea midline. No cervical masses seen or palpated. Normal range of motion, supple. No meningeal signs elicited.  Cardiac: Regular rate and rhythm. S1-S2 present. No S3 or S4 present. No murmurs, rubs, or gallops heard. No edema or varicosities were seen.   Lungs: Coarse breath sounds diffusely.  Obese rhinorrhea is present.  No wheezes, rales, or rhonchi heard. Patient's chest wall moved symmetrically with each respiratory effort. Patient was not making use of accessory muscles of respiration in breathing.  Abdomen: Soft nontender to palpation. No rebound or guarding elicited. No organomegaly identified. No pulsatile  abdominal masses identified.   Neurologic: alert and awake . Moves all four extremities independently, no gross focal abnormalities identified. Normal strength and motor.  Skin: no rash or lesion seen, no palpable dermatologic lesions identified.  Mucous membranes moist.  ENT exam: Mucous membranes moist.  No tongue or dental lesions seen.  No ear drainage is seen.  Mastoids normal bilaterally    Medical decision making: Laboratory studies were obtained (please see lab sheet for all results) significant findings included positive RSV test negative strep test    Chest x-ray obtained; no evidence of consolidation or other abnormality seen.    Patient be discharged with RSV instructions.  Parents are to use Tylenol and Motrin for fever and encourage fluid intake.  Child is not appear to be systemically ill or toxic.  Child does not have any signs or symptoms of pneumonia and does not appear to be significantly dehydrated.  Outpatient treatment and follow-up seems reasonable    Mother will follow-up with primary care provider for general care.  Mother is carefully counseled return to the for worsening cough vomiting change in mental status or any other problems    Mother verbalized understanding of these instructions and states she will comply    Pression RSV  pneumonitis     None

## 2022-12-17 NOTE — H&P ADULT - NSHPLABSRESULTS_GEN_ALL_CORE
LABS:                        12.9   5.61  )-----------( 216      ( 17 Dec 2022 05:00 )             40.8     12-17    137  |  104  |  11  ----------------------------<  230<H>  4.1   |  28  |  0.81    Ca    9.3      17 Dec 2022 05:00    TPro  6.7  /  Alb  3.2<L>  /  TBili  0.3  /  DBili  x   /  AST  59<H>  /  ALT  20  /  AlkPhos  63  12-17

## 2022-12-17 NOTE — H&P ADULT - ASSESSMENT
83 y/o F   h/o  DM, hypothyroid, depression.     unsteady   gait,  falls,  OA  spine, uses a  walker  at  home    presenting to the ED     s/p fall.  reports she fell yesterday at home and landed on her L side. +head  strike,   seen in er/ on isolation  appears   very comfortable  pt  says,  she's not  sure , as  to  why  she  is here   denies  cp/sob/abd pain/fevers/ cough      *  admitted   with  a fall   pt with  h/o  falls, last  week and   this  week   CT  head, normal   and,  CT pelvis, no fx  *   Covid  +,  in er   pt  denies  priod  covid +  pt  is a febrile, normal  wbc  and  O2  sat  is 93  denies  fevers/  sob/cp/ leg pain  hence, no  rx  initiated   cxr  ordered. pending   elg  .pending   *  DM   on metformin,  follow  fs  *  on dvt  ppx/ lovenox  qd   PT  yimi    spoke  with  family/ Lizette and  her  son  per  family,  pt  does  not  wear her  dentures           81 y/o F   h/o  DM, hypothyroid, depression.     unsteady   gait,  falls,  OA  spine, uses a  walker  at  home    presenting to the ED     s/p fall.  reports she fell yesterday at home and landed on her L side. +head  strike,   seen in er/ on isolation  appears   very comfortable  pt  says,  she's not  sure , as  to  why  she  is here   denies  cp/sob/abd pain/fevers/ cough      *  admitted   with  a fall   pt with  h/o  falls, last  week and   this  week   CT  head, normal   and,  CT pelvis, no fx  *   Covid  +,  in er   pt  denies  priod  covid +/ confirmed  by  son and  also, pt was  never  vaccinated  pt  is a febrile, normal  wbc  and  O2  sat  is 93  denies  fevers/  sob/cp/ leg pain  hence, no  rx  initiated / of  pt  deteriorates, then will need  to re assess  cxr  ordered. pending   elg  .pending   *  DM   on metformin,  follow  fs  *  on dvt  ppx/ lovenox  qd   PT  yimi    spoke  with  family/ Lizette and  her  son  per  family,  pt  does  not  wear her  dentures

## 2022-12-17 NOTE — ED ADULT NURSE NOTE - CHPI ED NUR SYMPTOMS NEG
no abrasion/no bleeding/no confusion/no fever/no loss of consciousness/no numbness/no tingling/no vomiting

## 2022-12-17 NOTE — H&P ADULT - HISTORY OF PRESENT ILLNESS
83 y/o F   h/o  DM, hypothyroid, depression    presenting to the ED     s/p fall   Patient reports she fell yesterday at home and landed on her L side. +head  strike,    Not on blood thinners  seen in er/ on isolation  appears   very comfortable  pt  says,  she's not  sure , as  to  why  she  is here   denies  cp/sob/abd pain/fevers/ cough

## 2022-12-17 NOTE — ED PROVIDER NOTE - OBJECTIVE STATEMENT
81 y/o F presenting to the ED s/p fall. Patient reports she fell yesterday at home and landed on her L side. +head injury. Not on blood thinners. Reports previous pelvis injury. Normally ambulates with a walker. She denies CP, dyspnea, N/V, or other acute symptoms.

## 2022-12-17 NOTE — ED PROVIDER NOTE - PHYSICAL EXAMINATION
GENERAL: Awake, alert, NAD  HEENT: swelling to the anterior forehead, moist mucous membranes  LUNGS: CTAB, no wheezes or crackles   CARDIAC: RRR, no m/r/g  ABDOMEN: Soft, normal BS, non tender, non distended, no rebound, no guarding  PELVIS: mild tenderness to the L hip, full ROM of bilateral lower extremities, no obvious deformity  EXT: No edema, no calf tenderness, 2+ DP pulses bilaterally, no deformities.  NEURO: A&Ox3. Moving all extremities.  SKIN: Warm and dry. No rash.  PSYCH: Normal affect.

## 2022-12-18 LAB
A1C WITH ESTIMATED AVERAGE GLUCOSE RESULT: 9.6 % — HIGH (ref 4–5.6)
ESTIMATED AVERAGE GLUCOSE: 229 MG/DL — HIGH (ref 68–114)
GLUCOSE BLDC GLUCOMTR-MCNC: 114 MG/DL — HIGH (ref 70–99)
GLUCOSE BLDC GLUCOMTR-MCNC: 116 MG/DL — HIGH (ref 70–99)
GLUCOSE BLDC GLUCOMTR-MCNC: 128 MG/DL — HIGH (ref 70–99)
GLUCOSE BLDC GLUCOMTR-MCNC: 96 MG/DL — SIGNIFICANT CHANGE UP (ref 70–99)

## 2022-12-18 PROCEDURE — 99232 SBSQ HOSP IP/OBS MODERATE 35: CPT

## 2022-12-18 PROCEDURE — 71045 X-RAY EXAM CHEST 1 VIEW: CPT | Mod: 26

## 2022-12-18 RX ADMIN — INSULIN GLARGINE 4 UNIT(S): 100 INJECTION, SOLUTION SUBCUTANEOUS at 22:10

## 2022-12-18 RX ADMIN — SODIUM CHLORIDE 70 MILLILITER(S): 9 INJECTION INTRAMUSCULAR; INTRAVENOUS; SUBCUTANEOUS at 22:10

## 2022-12-18 RX ADMIN — Medication 650 MILLIGRAM(S): at 05:08

## 2022-12-18 RX ADMIN — Medication 10 MILLIGRAM(S): at 22:10

## 2022-12-18 RX ADMIN — Medication 10 MILLIGRAM(S): at 14:04

## 2022-12-18 RX ADMIN — Medication 0: at 08:56

## 2022-12-18 RX ADMIN — Medication 10 MILLIGRAM(S): at 05:10

## 2022-12-18 RX ADMIN — Medication 150 MILLIGRAM(S): at 16:04

## 2022-12-18 RX ADMIN — SENNA PLUS 2 TABLET(S): 8.6 TABLET ORAL at 22:10

## 2022-12-18 RX ADMIN — Medication 0: at 11:10

## 2022-12-18 RX ADMIN — ENOXAPARIN SODIUM 40 MILLIGRAM(S): 100 INJECTION SUBCUTANEOUS at 11:10

## 2022-12-18 RX ADMIN — Medication 50 MICROGRAM(S): at 05:08

## 2022-12-18 NOTE — PROGRESS NOTE ADULT - ASSESSMENT
81 y/o F h/o  DM, hypothyroid, depression, unsteady   gait,  falls,  OA  spine, uses a  walker  at  home  Presenting to the ED s/p fall.  reports she fell yesterday at home and landed on her L side. +head  strike,     Fall   pt with  h/o  falls, last  week and   this  week  CT  head, normal  CT pelvis, no fx  PT evaluation pending     COVID19  pt  denies  prior covid +/ confirmed  by  son and  also, pt was  never  vaccinated  CXR-   d-dimer-      DM  [id number]  sliding scale     Hypothyrodism  -synthroid     dvt  ppx/ lovenox  qd   PT  yimi    spoke  with  family/ Lizette and  her  son  as per my colleage who spoke with family yesterday "  pt  does  not  wear her  dentures"

## 2022-12-18 NOTE — PROGRESS NOTE ADULT - SUBJECTIVE AND OBJECTIVE BOX
Patient is a 82y old  Female who presents with a chief complaint of fall (17 Dec 2022 12:31)      INTERVAL HPI/OVERNIGHT EVENTS: pt laying in bed, states she's just tired, otherwise denies pain. AOx3    MEDICATIONS  (STANDING):  busPIRone 10 milliGRAM(s) Oral three times a day  dextrose 5%. 1000 milliLiter(s) (50 mL/Hr) IV Continuous <Continuous>  dextrose 5%. 1000 milliLiter(s) (100 mL/Hr) IV Continuous <Continuous>  dextrose 5%. 1000 milliLiter(s) (50 mL/Hr) IV Continuous <Continuous>  dextrose 50% Injectable 25 Gram(s) IV Push once  dextrose 50% Injectable 12.5 Gram(s) IV Push once  dextrose 50% Injectable 25 Gram(s) IV Push once  enoxaparin Injectable 40 milliGRAM(s) SubCutaneous every 24 hours  glucagon  Injectable 1 milliGRAM(s) IntraMuscular once  insulin glargine Injectable (LANTUS) 4 Unit(s) SubCutaneous at bedtime  insulin lispro (ADMELOG) corrective regimen sliding scale   SubCutaneous three times a day before meals  levothyroxine 50 MICROGram(s) Oral daily  senna 2 Tablet(s) Oral at bedtime  sodium chloride 0.9%. 1000 milliLiter(s) (70 mL/Hr) IV Continuous <Continuous>  venlafaxine XR. 150 milliGRAM(s) Oral daily    MEDICATIONS  (PRN):  acetaminophen     Tablet .. 650 milliGRAM(s) Oral every 6 hours PRN Mild Pain (1 - 3)  dextrose Oral Gel 15 Gram(s) Oral once PRN Blood Glucose LESS THAN 70 milliGRAM(s)/deciliter  dextrose Oral Gel 15 Gram(s) Oral once PRN Blood Glucose LESS THAN 70 milliGRAM(s)/deciliter      Allergies    No Known Allergies    Intolerances        REVIEW OF SYSTEMS:  CONSTITUTIONAL: No fever, weight loss, or fatigue  EYES: No eye pain, visual disturbances, or discharge  ENMT:  No difficulty hearing, tinnitus, vertigo; No sinus or throat pain  NECK: No pain or stiffness  BREASTS: No pain, masses, or nipple discharge  RESPIRATORY: No cough, wheezing, chills or hemoptysis; No shortness of breath  CARDIOVASCULAR: No chest pain, palpitations, dizziness, or leg swelling  GASTROINTESTINAL: No abdominal or epigastric pain. No nausea, vomiting, or hematemesis; No diarrhea or constipation. No melena or hematochezia.  GENITOURINARY: No dysuria, frequency, hematuria, or incontinence  NEUROLOGICAL: No headaches, memory loss, loss of strength, numbness, or tremors  SKIN: No itching, burning, rashes, or lesions   LYMPH NODES: No enlarged glands  ENDOCRINE: No heat or cold intolerance; No hair loss  MUSCULOSKELETAL: No joint pain or swelling; No muscle, back, or extremity pain  PSYCHIATRIC: No depression, anxiety, mood swings, or difficulty sleeping  HEME/LYMPH: No easy bruising, or bleeding gums  ALLERGY AND IMMUNOLOGIC: No hives or eczema    Vital Signs Last 24 Hrs  T(C): 36.7 (18 Dec 2022 00:15), Max: 37.3 (17 Dec 2022 17:05)  T(F): 98 (18 Dec 2022 00:15), Max: 99.1 (17 Dec 2022 17:05)  HR: 80 (18 Dec 2022 04:53) (80 - 88)  BP: 137/76 (18 Dec 2022 04:53) (128/81 - 149/84)  BP(mean): --  RR: 18 (18 Dec 2022 04:53) (17 - 18)  SpO2: 95% (18 Dec 2022 04:53) (95% - 98%)    Parameters below as of 18 Dec 2022 04:53  Patient On (Oxygen Delivery Method): room air        PHYSICAL EXAM:  GENERAL: NAD, well-groomed, well-developed  HEAD:  Atraumatic, Normocephalic  EYES: EOMI, PERRLA, conjunctiva and sclera clear  ENMT: No tonsillar erythema, exudates, or enlargement; Moist mucous membranes, Good dentition, No lesions  NECK: Supple, No JVD, Normal thyroid  NERVOUS SYSTEM:  Alert & Oriented X3, Good concentration; Motor Strength 5/5 B/L upper and lower extremities; DTRs 2+ intact and symmetric  CHEST/LUNG: Clear to percussion bilaterally; No rales, rhonchi, wheezing, or rubs  HEART: Regular rate and rhythm; No murmurs, rubs, or gallops  ABDOMEN: Soft, Nontender, Nondistended; Bowel sounds present  EXTREMITIES:  2+ Peripheral Pulses, No clubbing, cyanosis, or edema  LYMPH: No lymphadenopathy noted  SKIN: No rashes or lesions    LABS:                        12.9   5.61  )-----------( 216      ( 17 Dec 2022 05:00 )             40.8     12-17    137  |  104  |  11  ----------------------------<  230<H>  4.1   |  28  |  0.81    Ca    9.3      17 Dec 2022 05:00    TPro  6.7  /  Alb  3.2<L>  /  TBili  0.3  /  DBili  x   /  AST  59<H>  /  ALT  20  /  AlkPhos  63  12-17        CAPILLARY BLOOD GLUCOSE      POCT Blood Glucose.: 114 mg/dL (18 Dec 2022 10:27)  POCT Blood Glucose.: 116 mg/dL (18 Dec 2022 08:14)  POCT Blood Glucose.: 177 mg/dL (17 Dec 2022 22:08)  POCT Blood Glucose.: 119 mg/dL (17 Dec 2022 17:01)      RADIOLOGY & ADDITIONAL TESTS:    Imaging Personally Reviewed:  [ ] YES  [ ] NO    Consultant(s) Notes Reviewed:  [ ] YES  [ ] NO    Care Discussed with Consultants/Other Providers [ ] YES  [ ] NO

## 2022-12-19 LAB
ALBUMIN SERPL ELPH-MCNC: 2.8 G/DL — LOW (ref 3.3–5)
ALP SERPL-CCNC: 54 U/L — SIGNIFICANT CHANGE UP (ref 40–120)
ALT FLD-CCNC: 29 U/L — SIGNIFICANT CHANGE UP (ref 12–78)
ANION GAP SERPL CALC-SCNC: 5 MMOL/L — SIGNIFICANT CHANGE UP (ref 5–17)
AST SERPL-CCNC: 78 U/L — HIGH (ref 15–37)
BILIRUB SERPL-MCNC: 0.2 MG/DL — SIGNIFICANT CHANGE UP (ref 0.2–1.2)
BUN SERPL-MCNC: 10 MG/DL — SIGNIFICANT CHANGE UP (ref 7–23)
CALCIUM SERPL-MCNC: 8.2 MG/DL — LOW (ref 8.5–10.1)
CHLORIDE SERPL-SCNC: 106 MMOL/L — SIGNIFICANT CHANGE UP (ref 96–108)
CO2 SERPL-SCNC: 26 MMOL/L — SIGNIFICANT CHANGE UP (ref 22–31)
CREAT SERPL-MCNC: 0.67 MG/DL — SIGNIFICANT CHANGE UP (ref 0.5–1.3)
D DIMER BLD IA.RAPID-MCNC: 359 NG/ML DDU — HIGH
EGFR: 88 ML/MIN/1.73M2 — SIGNIFICANT CHANGE UP
GLUCOSE BLDC GLUCOMTR-MCNC: 109 MG/DL — HIGH (ref 70–99)
GLUCOSE BLDC GLUCOMTR-MCNC: 126 MG/DL — HIGH (ref 70–99)
GLUCOSE BLDC GLUCOMTR-MCNC: 136 MG/DL — HIGH (ref 70–99)
GLUCOSE BLDC GLUCOMTR-MCNC: 95 MG/DL — SIGNIFICANT CHANGE UP (ref 70–99)
GLUCOSE SERPL-MCNC: 110 MG/DL — HIGH (ref 70–99)
HCT VFR BLD CALC: 42.6 % — SIGNIFICANT CHANGE UP (ref 34.5–45)
HGB BLD-MCNC: 13.2 G/DL — SIGNIFICANT CHANGE UP (ref 11.5–15.5)
MCHC RBC-ENTMCNC: 26.7 PG — LOW (ref 27–34)
MCHC RBC-ENTMCNC: 31 G/DL — LOW (ref 32–36)
MCV RBC AUTO: 86.1 FL — SIGNIFICANT CHANGE UP (ref 80–100)
NRBC # BLD: 0 /100 WBCS — SIGNIFICANT CHANGE UP (ref 0–0)
PLATELET # BLD AUTO: 173 K/UL — SIGNIFICANT CHANGE UP (ref 150–400)
POTASSIUM SERPL-MCNC: 3.3 MMOL/L — LOW (ref 3.5–5.3)
POTASSIUM SERPL-SCNC: 3.3 MMOL/L — LOW (ref 3.5–5.3)
PROT SERPL-MCNC: 6.3 GM/DL — SIGNIFICANT CHANGE UP (ref 6–8.3)
RBC # BLD: 4.95 M/UL — SIGNIFICANT CHANGE UP (ref 3.8–5.2)
RBC # FLD: 13.6 % — SIGNIFICANT CHANGE UP (ref 10.3–14.5)
SODIUM SERPL-SCNC: 137 MMOL/L — SIGNIFICANT CHANGE UP (ref 135–145)
WBC # BLD: 4.18 K/UL — SIGNIFICANT CHANGE UP (ref 3.8–10.5)
WBC # FLD AUTO: 4.18 K/UL — SIGNIFICANT CHANGE UP (ref 3.8–10.5)

## 2022-12-19 PROCEDURE — 99232 SBSQ HOSP IP/OBS MODERATE 35: CPT

## 2022-12-19 PROCEDURE — 71260 CT THORAX DX C+: CPT | Mod: 26

## 2022-12-19 RX ORDER — POTASSIUM CHLORIDE 20 MEQ
40 PACKET (EA) ORAL ONCE
Refills: 0 | Status: COMPLETED | OUTPATIENT
Start: 2022-12-19 | End: 2022-12-19

## 2022-12-19 RX ADMIN — Medication 40 MILLIEQUIVALENT(S): at 13:00

## 2022-12-19 RX ADMIN — Medication 10 MILLIGRAM(S): at 21:52

## 2022-12-19 RX ADMIN — ENOXAPARIN SODIUM 40 MILLIGRAM(S): 100 INJECTION SUBCUTANEOUS at 13:00

## 2022-12-19 RX ADMIN — Medication 150 MILLIGRAM(S): at 13:00

## 2022-12-19 RX ADMIN — SODIUM CHLORIDE 70 MILLILITER(S): 9 INJECTION INTRAMUSCULAR; INTRAVENOUS; SUBCUTANEOUS at 21:52

## 2022-12-19 RX ADMIN — Medication 10 MILLIGRAM(S): at 06:24

## 2022-12-19 RX ADMIN — Medication 10 MILLIGRAM(S): at 16:58

## 2022-12-19 RX ADMIN — SODIUM CHLORIDE 70 MILLILITER(S): 9 INJECTION INTRAMUSCULAR; INTRAVENOUS; SUBCUTANEOUS at 12:59

## 2022-12-19 RX ADMIN — Medication 50 MICROGRAM(S): at 06:24

## 2022-12-19 NOTE — PROGRESS NOTE ADULT - ASSESSMENT
83 y/o F h/o  DM, hypothyroid, depression, unsteady   gait,  falls,  OA  spine, uses a  walker  at  home  Presenting to the ED s/p fall.  reports she fell yesterday at home and landed on her L side. +head  strike,     Fall   pt with  h/o  falls, last  week and   this  week  CT  head, normal  CT pelvis, no fx  PT evaluation pending     COVID19  pt  denies  prior covid +/ confirmed  by  son and  also, pt was  never  vaccinated  CXR-  Small calcified granuloma or bone island projects over the   left lower lung. The lungs are otherwise clear.  WIll order ACE level- to r/o Sarcoidosis- especially with hx of life long depression/anxiety as per daughter in law  Will obtain CT scan of lung  Daugheter in law also mentioned patient had a 30pound weight loss in last year (but also has teeth problems-unable to eat regular food b/c does not wear dentures)  d-dimer- mildly elevated      DM  [id number]  sliding scale     Hypothyrodism  -synthroid     Depression/Anxiety  -continue home meds     dvt  ppx/ lovenox  qd   PT  eval    updated Family today

## 2022-12-19 NOTE — PROGRESS NOTE ADULT - SUBJECTIVE AND OBJECTIVE BOX
Patient is a 82y old  Female who presents with a chief complaint of fall (17 Dec 2022 12:31)      INTERVAL HPI/OVERNIGHT EVENTS: pt AOx3, but states that is tired. Continues to be on room air     MEDICATIONS  (STANDING):  busPIRone 10 milliGRAM(s) Oral three times a day  dextrose 5%. 1000 milliLiter(s) (50 mL/Hr) IV Continuous <Continuous>  dextrose 5%. 1000 milliLiter(s) (100 mL/Hr) IV Continuous <Continuous>  dextrose 5%. 1000 milliLiter(s) (50 mL/Hr) IV Continuous <Continuous>  dextrose 50% Injectable 25 Gram(s) IV Push once  dextrose 50% Injectable 12.5 Gram(s) IV Push once  dextrose 50% Injectable 25 Gram(s) IV Push once  enoxaparin Injectable 40 milliGRAM(s) SubCutaneous every 24 hours  glucagon  Injectable 1 milliGRAM(s) IntraMuscular once  insulin glargine Injectable (LANTUS) 4 Unit(s) SubCutaneous at bedtime  insulin lispro (ADMELOG) corrective regimen sliding scale   SubCutaneous three times a day before meals  levothyroxine 50 MICROGram(s) Oral daily  senna 2 Tablet(s) Oral at bedtime  sodium chloride 0.9%. 1000 milliLiter(s) (70 mL/Hr) IV Continuous <Continuous>  venlafaxine XR. 150 milliGRAM(s) Oral daily    MEDICATIONS  (PRN):  acetaminophen     Tablet .. 650 milliGRAM(s) Oral every 6 hours PRN Mild Pain (1 - 3)  dextrose Oral Gel 15 Gram(s) Oral once PRN Blood Glucose LESS THAN 70 milliGRAM(s)/deciliter  dextrose Oral Gel 15 Gram(s) Oral once PRN Blood Glucose LESS THAN 70 milliGRAM(s)/deciliter      Allergies    No Known Allergies    Intolerances        REVIEW OF SYSTEMS:  CONSTITUTIONAL: No fever, weight loss, or fatigue  EYES: No eye pain, visual disturbances, or discharge  ENMT:  No difficulty hearing, tinnitus, vertigo; No sinus or throat pain  NECK: No pain or stiffness  BREASTS: No pain, masses, or nipple discharge  RESPIRATORY: No cough, wheezing, chills or hemoptysis; No shortness of breath  CARDIOVASCULAR: No chest pain, palpitations, dizziness, or leg swelling  GASTROINTESTINAL: No abdominal or epigastric pain. No nausea, vomiting, or hematemesis; No diarrhea or constipation. No melena or hematochezia.  GENITOURINARY: No dysuria, frequency, hematuria, or incontinence  NEUROLOGICAL: No headaches, memory loss, loss of strength, numbness, or tremors  SKIN: No itching, burning, rashes, or lesions   LYMPH NODES: No enlarged glands  ENDOCRINE: No heat or cold intolerance; No hair loss  MUSCULOSKELETAL: No joint pain or swelling; No muscle, back, or extremity pain  PSYCHIATRIC: No depression, anxiety, mood swings, or difficulty sleeping  HEME/LYMPH: No easy bruising, or bleeding gums  ALLERGY AND IMMUNOLOGIC: No hives or eczema    Vital Signs Last 24 Hrs  ICU Vital Signs Last 24 Hrs  T(C): 36.9 (19 Dec 2022 11:57), Max: 36.9 (19 Dec 2022 11:57)  T(F): 98.4 (19 Dec 2022 11:57), Max: 98.4 (19 Dec 2022 11:57)  HR: 87 (19 Dec 2022 11:57) (80 - 87)  BP: 125/79 (19 Dec 2022 11:57) (107/62 - 136/60)  BP(mean): --  ABP: --  ABP(mean): --  RR: 17 (19 Dec 2022 11:57) (17 - 18)  SpO2: 95% (19 Dec 2022 11:57) (95% - 97%)    O2 Parameters below as of 18 Dec 2022 17:12  Patient On (Oxygen Delivery Method): nasal cannula              PHYSICAL EXAM:  GENERAL: NAD, well-groomed, well-developed  HEAD:  Atraumatic, Normocephalic  EYES: EOMI, PERRLA, conjunctiva and sclera clear  ENMT: No tonsillar erythema, exudates, or enlargement; Moist mucous membranes, Good dentition, No lesions  NECK: Supple, No JVD, Normal thyroid  NERVOUS SYSTEM:  Alert & Oriented X3, Good concentration; Motor Strength 5/5 B/L upper and lower extremities; DTRs 2+ intact and symmetric  CHEST/LUNG: Clear to percussion bilaterally; No rales, rhonchi, wheezing, or rubs  HEART: Regular rate and rhythm; No murmurs, rubs, or gallops  ABDOMEN: Soft, Nontender, Nondistended; Bowel sounds present  EXTREMITIES:  2+ Peripheral Pulses, No clubbing, cyanosis, or edema  LYMPH: No lymphadenopathy noted  SKIN: No rashes or lesions    LABS:                                         13.2   4.18  )-----------( 173      ( 19 Dec 2022 06:10 )             42.6     12-19    137  |  106  |  10  ----------------------------<  110<H>  3.3<L>   |  26  |  0.67    Ca    8.2<L>      19 Dec 2022 06:10    TPro  6.3  /  Alb  2.8<L>  /  TBili  0.2  /  DBili  x   /  AST  78<H>  /  ALT  29  /  AlkPhos  54  12-19

## 2022-12-20 ENCOUNTER — TRANSCRIPTION ENCOUNTER (OUTPATIENT)
Age: 82
End: 2022-12-20

## 2022-12-20 LAB
ANION GAP SERPL CALC-SCNC: 6 MMOL/L — SIGNIFICANT CHANGE UP (ref 5–17)
BUN SERPL-MCNC: 10 MG/DL — SIGNIFICANT CHANGE UP (ref 7–23)
CALCIUM SERPL-MCNC: 8.4 MG/DL — LOW (ref 8.5–10.1)
CHLORIDE SERPL-SCNC: 107 MMOL/L — SIGNIFICANT CHANGE UP (ref 96–108)
CO2 SERPL-SCNC: 26 MMOL/L — SIGNIFICANT CHANGE UP (ref 22–31)
CREAT SERPL-MCNC: 0.71 MG/DL — SIGNIFICANT CHANGE UP (ref 0.5–1.3)
EGFR: 86 ML/MIN/1.73M2 — SIGNIFICANT CHANGE UP
GLUCOSE BLDC GLUCOMTR-MCNC: 117 MG/DL — HIGH (ref 70–99)
GLUCOSE BLDC GLUCOMTR-MCNC: 126 MG/DL — HIGH (ref 70–99)
GLUCOSE BLDC GLUCOMTR-MCNC: 136 MG/DL — HIGH (ref 70–99)
GLUCOSE BLDC GLUCOMTR-MCNC: 82 MG/DL — SIGNIFICANT CHANGE UP (ref 70–99)
GLUCOSE SERPL-MCNC: 93 MG/DL — SIGNIFICANT CHANGE UP (ref 70–99)
HCT VFR BLD CALC: 41.8 % — SIGNIFICANT CHANGE UP (ref 34.5–45)
HGB BLD-MCNC: 13.3 G/DL — SIGNIFICANT CHANGE UP (ref 11.5–15.5)
MCHC RBC-ENTMCNC: 26.8 PG — LOW (ref 27–34)
MCHC RBC-ENTMCNC: 31.8 G/DL — LOW (ref 32–36)
MCV RBC AUTO: 84.3 FL — SIGNIFICANT CHANGE UP (ref 80–100)
NRBC # BLD: 0 /100 WBCS — SIGNIFICANT CHANGE UP (ref 0–0)
PLATELET # BLD AUTO: 161 K/UL — SIGNIFICANT CHANGE UP (ref 150–400)
POTASSIUM SERPL-MCNC: 3.7 MMOL/L — SIGNIFICANT CHANGE UP (ref 3.5–5.3)
POTASSIUM SERPL-SCNC: 3.7 MMOL/L — SIGNIFICANT CHANGE UP (ref 3.5–5.3)
RBC # BLD: 4.96 M/UL — SIGNIFICANT CHANGE UP (ref 3.8–5.2)
RBC # FLD: 13.4 % — SIGNIFICANT CHANGE UP (ref 10.3–14.5)
SODIUM SERPL-SCNC: 139 MMOL/L — SIGNIFICANT CHANGE UP (ref 135–145)
TSH SERPL-MCNC: 5.86 UU/ML — HIGH (ref 0.36–3.74)
WBC # BLD: 4.24 K/UL — SIGNIFICANT CHANGE UP (ref 3.8–10.5)
WBC # FLD AUTO: 4.24 K/UL — SIGNIFICANT CHANGE UP (ref 3.8–10.5)

## 2022-12-20 PROCEDURE — 93306 TTE W/DOPPLER COMPLETE: CPT | Mod: 26

## 2022-12-20 PROCEDURE — 99232 SBSQ HOSP IP/OBS MODERATE 35: CPT

## 2022-12-20 RX ADMIN — SODIUM CHLORIDE 70 MILLILITER(S): 9 INJECTION INTRAMUSCULAR; INTRAVENOUS; SUBCUTANEOUS at 21:32

## 2022-12-20 RX ADMIN — Medication 50 MICROGRAM(S): at 05:17

## 2022-12-20 RX ADMIN — Medication 10 MILLIGRAM(S): at 21:32

## 2022-12-20 RX ADMIN — Medication 150 MILLIGRAM(S): at 12:19

## 2022-12-20 RX ADMIN — Medication 10 MILLIGRAM(S): at 15:47

## 2022-12-20 RX ADMIN — ENOXAPARIN SODIUM 40 MILLIGRAM(S): 100 INJECTION SUBCUTANEOUS at 12:19

## 2022-12-20 RX ADMIN — Medication 10 MILLIGRAM(S): at 05:17

## 2022-12-20 NOTE — PHYSICAL THERAPY INITIAL EVALUATION ADULT - ADDITIONAL COMMENTS
pt lives at home with her 2 sons, PLOF is indep with RW. pt encountered in bed supine, NAD aaox3 able to follow v.c,+ primafit. pt require mod a for bed mob and max a for transfers, able to amb with RW x 4 small steps. pt require assist with moving LLE sec to report of pain.

## 2022-12-20 NOTE — DIETITIAN INITIAL EVALUATION ADULT - OTHER INFO
Pt on COVID isolation. Attempt to reach pt via room phone unsuccessful.     Per MD note (12/19) family reports pt unable to consume regular foods PTA due to being edentulous and not wearing her dentures. Family also reports pt with 30 pounds weight loss x 1 years likely due to this. See weight loss as noted below.     Pt with history of T2DM; Metformin PTA per H&P. HbA1c 9.6% indicates poor blood glucose management.     Will attempt nutrition education on follow-up as per protocol. RD remains available.

## 2022-12-20 NOTE — DIETITIAN INITIAL EVALUATION ADULT - ETIOLOGY
Inadequate protein-energy intake and increased protein needs in setting of edentulism + stage II pressure injury + uncontrolled diabetes

## 2022-12-20 NOTE — DIETITIAN INITIAL EVALUATION ADULT - PERTINENT LABORATORY DATA
12-20    139  |  107  |  10  ----------------------------<  93  3.7   |  26  |  0.71    Ca    8.4<L>      20 Dec 2022 06:40    TPro  6.3  /  Alb  2.8<L>  /  TBili  0.2  /  DBili  x   /  AST  78<H>  /  ALT  29  /  AlkPhos  54  12-19  POCT Blood Glucose.: 136 mg/dL (12-20-22 @ 11:28)  A1C with Estimated Average Glucose Result: 9.6 % (12-18-22 @ 06:00)

## 2022-12-20 NOTE — DIETITIAN INITIAL EVALUATION ADULT - NS FNS DIET ORDER
Diet, Consistent Carbohydrate w/Evening Snack:   Soft and Bite Sized (SOFTBTSZ) (12-17-22 @ 12:54) [Active]

## 2022-12-20 NOTE — PHYSICAL THERAPY INITIAL EVALUATION ADULT - ORIENTATION, REHAB EVAL
Dr Kwong American patient stated she never take her medication as it is to expensive asking for a cheaper one ,please advise. oriented to person, place, time and situation

## 2022-12-20 NOTE — DIETITIAN NUTRITION RISK NOTIFICATION - TREATMENT: THE FOLLOWING DIET HAS BEEN RECOMMENDED
Diet, Soft and Bite Sized:   Consistent Carbohydrate {Evening Snack}  Supplement Feeding Modality:  Oral  Glucerna Shake Cans or Servings Per Day:  1       Frequency:  Two Times a day (12-20-22 @ 12:49) [Pending Verification By Attending]  Diet, Consistent Carbohydrate w/Evening Snack:   Soft and Bite Sized (SOFTBTSZ) (12-17-22 @ 12:54) [Active]

## 2022-12-20 NOTE — DIETITIAN INITIAL EVALUATION ADULT - PERTINENT MEDS FT
MEDICATIONS  (STANDING):  busPIRone 10 milliGRAM(s) Oral three times a day  dextrose 5%. 1000 milliLiter(s) (50 mL/Hr) IV Continuous <Continuous>  dextrose 5%. 1000 milliLiter(s) (100 mL/Hr) IV Continuous <Continuous>  dextrose 5%. 1000 milliLiter(s) (50 mL/Hr) IV Continuous <Continuous>  dextrose 50% Injectable 25 Gram(s) IV Push once  dextrose 50% Injectable 12.5 Gram(s) IV Push once  dextrose 50% Injectable 25 Gram(s) IV Push once  enoxaparin Injectable 40 milliGRAM(s) SubCutaneous every 24 hours  glucagon  Injectable 1 milliGRAM(s) IntraMuscular once  insulin glargine Injectable (LANTUS) 4 Unit(s) SubCutaneous at bedtime  insulin lispro (ADMELOG) corrective regimen sliding scale   SubCutaneous three times a day before meals  levothyroxine 50 MICROGram(s) Oral daily  senna 2 Tablet(s) Oral at bedtime  sodium chloride 0.9%. 1000 milliLiter(s) (70 mL/Hr) IV Continuous <Continuous>  venlafaxine XR. 150 milliGRAM(s) Oral daily    MEDICATIONS  (PRN):  acetaminophen     Tablet .. 650 milliGRAM(s) Oral every 6 hours PRN Mild Pain (1 - 3)  dextrose Oral Gel 15 Gram(s) Oral once PRN Blood Glucose LESS THAN 70 milliGRAM(s)/deciliter  dextrose Oral Gel 15 Gram(s) Oral once PRN Blood Glucose LESS THAN 70 milliGRAM(s)/deciliter

## 2022-12-20 NOTE — PROGRESS NOTE ADULT - SUBJECTIVE AND OBJECTIVE BOX
Patient is a 82y old  Female who presents with a chief complaint of fall (17 Dec 2022 12:31)      INTERVAL HPI/OVERNIGHT EVENTS: pt AOx3, but states that is tired. Continues to be on room air     MEDICATIONS  (STANDING):  busPIRone 10 milliGRAM(s) Oral three times a day  dextrose 5%. 1000 milliLiter(s) (50 mL/Hr) IV Continuous <Continuous>  dextrose 5%. 1000 milliLiter(s) (100 mL/Hr) IV Continuous <Continuous>  dextrose 5%. 1000 milliLiter(s) (50 mL/Hr) IV Continuous <Continuous>  dextrose 50% Injectable 25 Gram(s) IV Push once  dextrose 50% Injectable 12.5 Gram(s) IV Push once  dextrose 50% Injectable 25 Gram(s) IV Push once  enoxaparin Injectable 40 milliGRAM(s) SubCutaneous every 24 hours  glucagon  Injectable 1 milliGRAM(s) IntraMuscular once  insulin glargine Injectable (LANTUS) 4 Unit(s) SubCutaneous at bedtime  insulin lispro (ADMELOG) corrective regimen sliding scale   SubCutaneous three times a day before meals  levothyroxine 50 MICROGram(s) Oral daily  senna 2 Tablet(s) Oral at bedtime  sodium chloride 0.9%. 1000 milliLiter(s) (70 mL/Hr) IV Continuous <Continuous>  venlafaxine XR. 150 milliGRAM(s) Oral daily    MEDICATIONS  (PRN):  acetaminophen     Tablet .. 650 milliGRAM(s) Oral every 6 hours PRN Mild Pain (1 - 3)  dextrose Oral Gel 15 Gram(s) Oral once PRN Blood Glucose LESS THAN 70 milliGRAM(s)/deciliter  dextrose Oral Gel 15 Gram(s) Oral once PRN Blood Glucose LESS THAN 70 milliGRAM(s)/deciliter      Allergies    No Known Allergies    Intolerances        REVIEW OF SYSTEMS:  CONSTITUTIONAL: No fever, weight loss, or fatigue  EYES: No eye pain, visual disturbances, or discharge  ENMT:  No difficulty hearing, tinnitus, vertigo; No sinus or throat pain  NECK: No pain or stiffness  BREASTS: No pain, masses, or nipple discharge  RESPIRATORY: No cough, wheezing, chills or hemoptysis; No shortness of breath  CARDIOVASCULAR: No chest pain, palpitations, dizziness, or leg swelling  GASTROINTESTINAL: No abdominal or epigastric pain. No nausea, vomiting, or hematemesis; No diarrhea or constipation. No melena or hematochezia.  GENITOURINARY: No dysuria, frequency, hematuria, or incontinence  NEUROLOGICAL: No headaches, memory loss, loss of strength, numbness, or tremors  SKIN: No itching, burning, rashes, or lesions   LYMPH NODES: No enlarged glands  ENDOCRINE: No heat or cold intolerance; No hair loss  MUSCULOSKELETAL: No joint pain or swelling; No muscle, back, or extremity pain  PSYCHIATRIC: No depression, anxiety, mood swings, or difficulty sleeping  HEME/LYMPH: No easy bruising, or bleeding gums  ALLERGY AND IMMUNOLOGIC: No hives or eczema    Vital Signs Last 24 Hrs  ICU Vital Signs Last 24 Hrs  T(C): 36.9 (19 Dec 2022 11:57), Max: 36.9 (19 Dec 2022 11:57)  T(F): 98.4 (19 Dec 2022 11:57), Max: 98.4 (19 Dec 2022 11:57)  HR: 87 (19 Dec 2022 11:57) (80 - 87)  BP: 125/79 (19 Dec 2022 11:57) (107/62 - 136/60)  BP(mean): --  ABP: --  ABP(mean): --  RR: 17 (19 Dec 2022 11:57) (17 - 18)  SpO2: 95% (19 Dec 2022 11:57) (95% - 97%)    O2 Parameters below as of 18 Dec 2022 17:12  Patient On (Oxygen Delivery Method): nasal cannula              PHYSICAL EXAM:  GENERAL: NAD, well-groomed, well-developed  HEAD:  Atraumatic, Normocephalic  EYES: EOMI, PERRLA, conjunctiva and sclera clear  ENMT: No tonsillar erythema, exudates, or enlargement; Moist mucous membranes, Good dentition, No lesions  NECK: Supple, No JVD, Normal thyroid  NERVOUS SYSTEM:  Alert & Oriented X3, Good concentration; Motor Strength 5/5 B/L upper and lower extremities; DTRs 2+ intact and symmetric  CHEST/LUNG: Clear to percussion bilaterally; No rales, rhonchi, wheezing, or rubs  HEART: Regular rate and rhythm; No murmurs, rubs, or gallops  ABDOMEN: Soft, Nontender, Nondistended; Bowel sounds present  EXTREMITIES:  2+ Peripheral Pulses, No clubbing, cyanosis, or edema  LYMPH: No lymphadenopathy noted  SKIN: No rashes or lesions    LABS:                                         13.2   4.18  )-----------( 173      ( 19 Dec 2022 06:10 )             42.6     12-19    137  |  106  |  10  ----------------------------<  110<H>  3.3<L>   |  26  |  0.67    Ca    8.2<L>      19 Dec 2022 06:10    TPro  6.3  /  Alb  2.8<L>  /  TBili  0.2  /  DBili  x   /  AST  78<H>  /  ALT  29  /  AlkPhos  54  12-19         Patient is a 82y old  Female who presents with a chief complaint of fall (17 Dec 2022 12:31)      INTERVAL HPI/OVERNIGHT EVENTS: pt AOx3, looks more energized today    MEDICATIONS  (STANDING):  busPIRone 10 milliGRAM(s) Oral three times a day  dextrose 5%. 1000 milliLiter(s) (50 mL/Hr) IV Continuous <Continuous>  dextrose 5%. 1000 milliLiter(s) (100 mL/Hr) IV Continuous <Continuous>  dextrose 5%. 1000 milliLiter(s) (50 mL/Hr) IV Continuous <Continuous>  dextrose 50% Injectable 25 Gram(s) IV Push once  dextrose 50% Injectable 12.5 Gram(s) IV Push once  dextrose 50% Injectable 25 Gram(s) IV Push once  enoxaparin Injectable 40 milliGRAM(s) SubCutaneous every 24 hours  glucagon  Injectable 1 milliGRAM(s) IntraMuscular once  insulin glargine Injectable (LANTUS) 4 Unit(s) SubCutaneous at bedtime  insulin lispro (ADMELOG) corrective regimen sliding scale   SubCutaneous three times a day before meals  levothyroxine 50 MICROGram(s) Oral daily  senna 2 Tablet(s) Oral at bedtime  sodium chloride 0.9%. 1000 milliLiter(s) (70 mL/Hr) IV Continuous <Continuous>  venlafaxine XR. 150 milliGRAM(s) Oral daily    MEDICATIONS  (PRN):  acetaminophen     Tablet .. 650 milliGRAM(s) Oral every 6 hours PRN Mild Pain (1 - 3)  dextrose Oral Gel 15 Gram(s) Oral once PRN Blood Glucose LESS THAN 70 milliGRAM(s)/deciliter  dextrose Oral Gel 15 Gram(s) Oral once PRN Blood Glucose LESS THAN 70 milliGRAM(s)/deciliter      Allergies    No Known Allergies    Intolerances        REVIEW OF SYSTEMS:  CONSTITUTIONAL: No fever, weight loss, or fatigue  EYES: No eye pain, visual disturbances, or discharge  ENMT:  No difficulty hearing, tinnitus, vertigo; No sinus or throat pain  NECK: No pain or stiffness  BREASTS: No pain, masses, or nipple discharge  RESPIRATORY: No cough, wheezing, chills or hemoptysis; No shortness of breath  CARDIOVASCULAR: No chest pain, palpitations, dizziness, or leg swelling  GASTROINTESTINAL: No abdominal or epigastric pain. No nausea, vomiting, or hematemesis; No diarrhea or constipation. No melena or hematochezia.  GENITOURINARY: No dysuria, frequency, hematuria, or incontinence  NEUROLOGICAL: No headaches, memory loss, loss of strength, numbness, or tremors  SKIN: No itching, burning, rashes, or lesions   LYMPH NODES: No enlarged glands  ENDOCRINE: No heat or cold intolerance; No hair loss  MUSCULOSKELETAL: No joint pain or swelling; No muscle, back, or extremity pain  PSYCHIATRIC: No depression, anxiety, mood swings, or difficulty sleeping  HEME/LYMPH: No easy bruising, or bleeding gums  ALLERGY AND IMMUNOLOGIC: No hives or eczema    Vital Signs Last 24 Hrs  ICU Vital Signs Last 24 Hrs  T(C): 37.4 (20 Dec 2022 11:10), Max: 37.4 (20 Dec 2022 11:10)  T(F): 99.3 (20 Dec 2022 11:10), Max: 99.3 (20 Dec 2022 11:10)  HR: 75 (20 Dec 2022 11:10) (74 - 84)  BP: 119/69 (20 Dec 2022 11:10) (113/67 - 135/73)  BP(mean): --  ABP: --  ABP(mean): --  RR: 18 (20 Dec 2022 11:10) (17 - 18)  SpO2: 95% (20 Dec 2022 11:10) (95% - 96%)                  PHYSICAL EXAM:  GENERAL: NAD, well-groomed, well-developed  HEAD:  Atraumatic, Normocephalic  EYES: EOMI, PERRLA, conjunctiva and sclera clear  ENMT: No tonsillar erythema, exudates, or enlargement; Moist mucous membranes, Good dentition, No lesions  NECK: Supple, No JVD,   NERVOUS SYSTEM:  Alert & Oriented X3, Good concentration; Motor Strength 5/5 B/L upper and lower extremities; DTRs 2+ intact and symmetric  CHEST/LUNG: Clear to percussion bilaterally; No rales, rhonchi, wheezing, or rubs  HEART: Regular rate and rhythm; No murmurs, rubs, or gallops  ABDOMEN: Soft, Nontender, Nondistended; Bowel sounds present  EXTREMITIES:  2+ Peripheral Pulses, No clubbing, cyanosis, or edema  SKIN: No rashes or lesions    LABS:                                     13.3   4.24  )-----------( 161      ( 20 Dec 2022 06:40 )             41.8     12-20    139  |  107  |  10  ----------------------------<  93  3.7   |  26  |  0.71    Ca    8.4<L>      20 Dec 2022 06:40    TPro  6.3  /  Alb  2.8<L>  /  TBili  0.2  /  DBili  x   /  AST  78<H>  /  ALT  29  /  AlkPhos  54  12-19

## 2022-12-20 NOTE — PROGRESS NOTE ADULT - ASSESSMENT
83 y/o F h/o  DM, hypothyroid, depression, unsteady   gait,  falls,  OA  spine, uses a  walker  at  home  Presenting to the ED s/p fall.  reports she fell yesterday at home and landed on her L side. +head  strike,     Fall   pt with  h/o  falls, last  week and   this  week  CT  head, normal  CT pelvis, no fx  PT evaluation pending     COVID19  pt  denies  prior covid +/ confirmed  by  son and  also, pt was  never  vaccinated  CXR-  Small calcified granuloma or bone island projects over the   left lower lung. The lungs are otherwise clear.  WIll order ACE level- to r/o Sarcoidosis- especially with hx of life long depression/anxiety as per daughter in law  Will obtain CT scan of lung  Daugheter in law also mentioned patient had a 30pound weight loss in last year (but also has teeth problems-unable to eat regular food b/c does not wear dentures)  d-dimer- mildly elevated      DM  LtJ6T-9.6  sliding scale     Hypothyrodism  -synthroid     Depression/Anxiety  -continue home meds     dvt  ppx/ lovenox  qd   PT  eval    updated Family today    81 y/o F h/o  DM, hypothyroid, depression, unsteady   gait,  falls,  OA  spine, uses a  walker  at  home  Presenting to the ED s/p fall.  reports she fell yesterday at home and landed on her L side. +head  strike,     Fall   pt with  h/o  falls, last  week and   this  week  CT  head, normal  CT pelvis, no fx  PT evaluation pending     COVID19  pt  denies  prior covid +/ confirmed  by  son and  also, pt was  never  vaccinated  CXR-  Small calcified granuloma or bone island projects over the   left lower lung. The lungs are otherwise clear.   r/o Sarcoidosis- especially with hx of life long depression/anxiety as per daughter in law-   ACE level-pending  CT scan of lung-reviewed     d-dimer- mildly elevated      DM  UhD4H-9.6  sliding scale     Hypothyroidism  TSH -elevated, will order Free T4  -on synthroid , if Free T4 low--will need to inc dose    Depression/Anxiety  -continue home meds     Incidental findings  1. Calcification of coronary arteries   2.  A 2 cm low-attenuation lesion with rim of   calcification is noted either in the left hepatic lobe and/or in the   gastrohepatic ligament of uncertain etiology.  Daughter in law also mentioned patient had a 30pound weight loss in last year (but also has teeth problems-unable to eat regular food b/c does not wear dentures)  -Will need an outpatient workup     dvt  ppx/ lovenox  qd   PT  eval        updated Family    81 y/o F h/o  DM, hypothyroid, depression, unsteady   gait,  falls,  OA  spine, uses a  walker  at  home  Presenting to the ED s/p fall.  reports she fell yesterday at home and landed on her L side. +head  strike,     Fall   pt with  h/o  falls, last  week and   this  week  CT  head, normal  CT pelvis, no fx  PT evaluation pending     COVID19  pt  denies  prior covid +/ confirmed  by  son and  also, pt was  never  vaccinated  CXR-  Small calcified granuloma or bone island projects over the   left lower lung. The lungs are otherwise clear.   r/o Sarcoidosis- especially with hx of life long depression/anxiety as per daughter in law-   ACE level-pending  CT scan of lung-reviewed     d-dimer- mildly elevated      DM  OxB0C-0.6  sliding scale     Hypothyroidism  TSH -elevated, will order Free T4  -on synthroid , if Free T4 low--will need to inc dose    Depression/Anxiety  -continue home meds     Incidental findings  1. Calcification of coronary arteries     2.  A 2 cm low-attenuation lesion with rim of   calcification is noted either in the left hepatic lobe and/or in the   gastrohepatic ligament of uncertain etiology.  Daughter in law also mentioned patient had a 30pound weight loss in last year (but also has teeth problems-unable to eat regular food b/c does not wear dentures)    3. Clinically- also as per nursing--patient noted to have vaginal prolapse   -Will need an outpatient workup     dvt  ppx/ lovenox  qd   PT  eval        updated Family

## 2022-12-21 LAB
ACE SERPL-CCNC: 47 U/L — SIGNIFICANT CHANGE UP (ref 14–82)
ANION GAP SERPL CALC-SCNC: 5 MMOL/L — SIGNIFICANT CHANGE UP (ref 5–17)
BUN SERPL-MCNC: 7 MG/DL — SIGNIFICANT CHANGE UP (ref 7–23)
CALCIUM SERPL-MCNC: 8.4 MG/DL — LOW (ref 8.5–10.1)
CHLORIDE SERPL-SCNC: 108 MMOL/L — SIGNIFICANT CHANGE UP (ref 96–108)
CO2 SERPL-SCNC: 26 MMOL/L — SIGNIFICANT CHANGE UP (ref 22–31)
CREAT SERPL-MCNC: 0.56 MG/DL — SIGNIFICANT CHANGE UP (ref 0.5–1.3)
EGFR: 92 ML/MIN/1.73M2 — SIGNIFICANT CHANGE UP
GLUCOSE BLDC GLUCOMTR-MCNC: 168 MG/DL — HIGH (ref 70–99)
GLUCOSE BLDC GLUCOMTR-MCNC: 180 MG/DL — HIGH (ref 70–99)
GLUCOSE BLDC GLUCOMTR-MCNC: 202 MG/DL — HIGH (ref 70–99)
GLUCOSE BLDC GLUCOMTR-MCNC: 99 MG/DL — SIGNIFICANT CHANGE UP (ref 70–99)
GLUCOSE SERPL-MCNC: 100 MG/DL — HIGH (ref 70–99)
HCT VFR BLD CALC: 42.3 % — SIGNIFICANT CHANGE UP (ref 34.5–45)
HGB BLD-MCNC: 13.5 G/DL — SIGNIFICANT CHANGE UP (ref 11.5–15.5)
MCHC RBC-ENTMCNC: 26.4 PG — LOW (ref 27–34)
MCHC RBC-ENTMCNC: 31.9 G/DL — LOW (ref 32–36)
MCV RBC AUTO: 82.6 FL — SIGNIFICANT CHANGE UP (ref 80–100)
NRBC # BLD: 0 /100 WBCS — SIGNIFICANT CHANGE UP (ref 0–0)
PLATELET # BLD AUTO: 150 K/UL — SIGNIFICANT CHANGE UP (ref 150–400)
POTASSIUM SERPL-MCNC: 3.8 MMOL/L — SIGNIFICANT CHANGE UP (ref 3.5–5.3)
POTASSIUM SERPL-SCNC: 3.8 MMOL/L — SIGNIFICANT CHANGE UP (ref 3.5–5.3)
RBC # BLD: 5.12 M/UL — SIGNIFICANT CHANGE UP (ref 3.8–5.2)
RBC # FLD: 13.4 % — SIGNIFICANT CHANGE UP (ref 10.3–14.5)
SODIUM SERPL-SCNC: 139 MMOL/L — SIGNIFICANT CHANGE UP (ref 135–145)
T4 FREE SERPL-MCNC: 1.1 NG/DL — SIGNIFICANT CHANGE UP (ref 0.9–1.8)
WBC # BLD: 3.68 K/UL — LOW (ref 3.8–10.5)
WBC # FLD AUTO: 3.68 K/UL — LOW (ref 3.8–10.5)

## 2022-12-21 PROCEDURE — 99232 SBSQ HOSP IP/OBS MODERATE 35: CPT

## 2022-12-21 RX ADMIN — Medication 1: at 12:27

## 2022-12-21 RX ADMIN — Medication 150 MILLIGRAM(S): at 12:48

## 2022-12-21 RX ADMIN — INSULIN GLARGINE 4 UNIT(S): 100 INJECTION, SOLUTION SUBCUTANEOUS at 22:05

## 2022-12-21 RX ADMIN — SENNA PLUS 2 TABLET(S): 8.6 TABLET ORAL at 22:05

## 2022-12-21 RX ADMIN — ENOXAPARIN SODIUM 40 MILLIGRAM(S): 100 INJECTION SUBCUTANEOUS at 12:48

## 2022-12-21 RX ADMIN — Medication 10 MILLIGRAM(S): at 05:02

## 2022-12-21 RX ADMIN — Medication 50 MICROGRAM(S): at 05:02

## 2022-12-21 RX ADMIN — Medication 10 MILLIGRAM(S): at 14:23

## 2022-12-21 RX ADMIN — Medication 2: at 17:25

## 2022-12-21 RX ADMIN — Medication 10 MILLIGRAM(S): at 22:05

## 2022-12-21 NOTE — PROGRESS NOTE ADULT - SUBJECTIVE AND OBJECTIVE BOX
Patient is a 82y old  Female who presents with a chief complaint of fall (20 Dec 2022 13:29)    INTERVAL HPI/OVERNIGHT EVENTS: no acute events overnight  SUBJECTIVE: no complaints of pain/discomfort /fever/chills/dyspnea/ phelgm/ cough    MEDICATIONS  (STANDING):  busPIRone 10 milliGRAM(s) Oral three times a day  dextrose 5%. 1000 milliLiter(s) (50 mL/Hr) IV Continuous <Continuous>  dextrose 5%. 1000 milliLiter(s) (100 mL/Hr) IV Continuous <Continuous>  dextrose 5%. 1000 milliLiter(s) (50 mL/Hr) IV Continuous <Continuous>  dextrose 50% Injectable 25 Gram(s) IV Push once  dextrose 50% Injectable 12.5 Gram(s) IV Push once  dextrose 50% Injectable 25 Gram(s) IV Push once  enoxaparin Injectable 40 milliGRAM(s) SubCutaneous every 24 hours  glucagon  Injectable 1 milliGRAM(s) IntraMuscular once  insulin glargine Injectable (LANTUS) 4 Unit(s) SubCutaneous at bedtime  insulin lispro (ADMELOG) corrective regimen sliding scale   SubCutaneous three times a day before meals  levothyroxine 50 MICROGram(s) Oral daily  senna 2 Tablet(s) Oral at bedtime  sodium chloride 0.9%. 1000 milliLiter(s) (70 mL/Hr) IV Continuous <Continuous>  venlafaxine XR. 150 milliGRAM(s) Oral daily    MEDICATIONS  (PRN):  acetaminophen     Tablet .. 650 milliGRAM(s) Oral every 6 hours PRN Mild Pain (1 - 3)  dextrose Oral Gel 15 Gram(s) Oral once PRN Blood Glucose LESS THAN 70 milliGRAM(s)/deciliter  dextrose Oral Gel 15 Gram(s) Oral once PRN Blood Glucose LESS THAN 70 milliGRAM(s)/deciliter    Allergies    No Known Allergies    Intolerances      REVIEW OF SYSTEMS:  All other systems reviewed and are negative    Vital Signs Last 24 Hrs  T(C): 37 (21 Dec 2022 12:36), Max: 37.2 (21 Dec 2022 05:50)  T(F): 98.6 (21 Dec 2022 12:36), Max: 98.9 (21 Dec 2022 05:50)  HR: 78 (21 Dec 2022 12:36) (74 - 83)  BP: 133/77 (21 Dec 2022 12:36) (124/75 - 144/77)  BP(mean): --  RR: 18 (21 Dec 2022 12:36) (17 - 18)  SpO2: 93% (21 Dec 2022 12:36) (93% - 97%)    Parameters below as of 21 Dec 2022 12:36  Patient On (Oxygen Delivery Method): room air      Daily     Daily   I&O's Summary    20 Dec 2022 07:01  -  21 Dec 2022 07:00  --------------------------------------------------------  IN: 800 mL / OUT: 400 mL / NET: 400 mL      CAPILLARY BLOOD GLUCOSE      POCT Blood Glucose.: 168 mg/dL (21 Dec 2022 11:30)  POCT Blood Glucose.: 99 mg/dL (21 Dec 2022 08:30)  POCT Blood Glucose.: 117 mg/dL (20 Dec 2022 20:46)  POCT Blood Glucose.: 126 mg/dL (20 Dec 2022 16:54)    PHYSICAL EXAM:  GENERAL: NAD, well-groomed, well-developed. pleasant. tired   HEAD:  Atraumatic, Normocephalic  EYES: EOMI, PERRLA, conjunctiva and sclera clear  NECK: Supple, No JVD, Normal thyroid  NERVOUS SYSTEM:  Alert & Oriented X3, Good concentration;  CHEST/LUNG: Clear to percussion bilaterally; No rales, rhonchi, wheezing, or rubs  HEART: Regular rate and rhythm; No murmurs, rubs, or gallops, no irregular heart sounds   ABDOMEN: Soft, Nontender, Nondistended; Bowel sounds present  EXTREMITIES: 1 + non pitting edema    Labs                          13.5   3.68  )-----------( 150      ( 21 Dec 2022 07:03 )             42.3     12-21    139  |  108  |  7   ----------------------------<  100<H>  3.8   |  26  |  0.56    Ca    8.4<L>      21 Dec 2022 07:03                          DVT prophylaxis: > Lovenox 40mg SQ daily  > Heparin   > SCD's

## 2022-12-21 NOTE — PROGRESS NOTE ADULT - ASSESSMENT
81 yo female w/ pmhx of depression, unsteady gait, hypothyroidism, diabetes type 2, osteoarthritis admitted to [facility] for mechanical fall, likely secondary to debility from covid 19    GENERAL MEDICINE  # recurrent mechanical falls  - CT brain- no acute infarct, no fx, no tumor  - CT pelvis- no fx  - PT evaluation- recommends [facility]    # failure to thrive   - 30 lb weight loss in the past year    INFECTIOUS DISEASE  # COVID 19  - CXR-  Small calcified granuloma or bone island projects over the left lower lung. The lungs are otherwise clear.  - no evidence of active covid 19 pna or cytokine storm syndrome  ACE level-pending  CT scan of lung-reviewed     d-dimer- mildly elevated     ENDOCRINOLOGY  #  DM  - AkS4R-1.6  - Lantus/ RISS    # Hypothyroidism  - synthroid 50 mcg po daily    PSYCHIATRY  # Depression/Anxiety  - effexor   - buspar     GASTROENTEROLOGY  # 2 cm low-attentuation lesion w/  rim of calcification in left hepatic lobe     OBGYN  #  vaginal prolapse   -Will need an outpatient workup     PLAN  - c/w F  - consult [facility]STR[facility] for abnormal CT scan   - follow up with ace levels   - outpt evaluation for failure to thrive    dvt  ppx/ lovenox  qd   PT  eval     83 yo female w/ pmhx of depression, unsteady gait, hypothyroidism, diabetes type 2, osteoarthritis admitted to [facility] for mechanical fall, likely secondary to debility from covid 19    GENERAL MEDICINE  # recurrent mechanical falls  - CT brain- no acute infarct, no fx, no tumor  - CT pelvis- no fx  - PT evaluation- recommends [facility]    # failure to thrive   - 30 lb weight loss in the past year    INFECTIOUS DISEASE  # COVID 19  - CXR-  Small calcified granuloma or bone island projects over the left lower lung. The lungs are otherwise clear.  - no evidence of active covid 19 pna or cytokine storm syndrome    # possible sarcoidosis?  - ACE level-pending  - CT scan of lung-reviewed. no findings consistent w/ sarcoidosis    ENDOCRINOLOGY  #  DM  - TfT5A-0.6  - Lantus/ RISS    # Hypothyroidism  - synthroid 50 mcg po daily    PSYCHIATRY  # Depression/Anxiety  - effexor   - buspar     GASTROENTEROLOGY  # 2 cm low-attentuation lesion w/  rim of calcification in left hepatic lobe     OBGYN  #  vaginal prolapse   -Will need an outpatient workup     PLAN  - c/w F  - consult GASTRO for abnormal CT scan   - follow up with ace levels   - outpt evaluation for failure to thrive    dvt  ppx/ lovenox  qd   PT  eval     83 yo female w/ pmhx of depression, unsteady gait, hypothyroidism, diabetes type 2, osteoarthritis admitted to [facility] for mechanical fall, likely secondary to debility from covid 19    GENERAL MEDICINE  # recurrent mechanical falls  - CT brain- no acute infarct, no fx, no tumor  - CT pelvis- no fx  - PT evaluation- recommends [facility]    # failure to thrive   - 30 lb weight loss in the past year    INFECTIOUS DISEASE  # COVID 19  - CXR-  Small calcified granuloma or bone island projects over the left lower lung. The lungs are otherwise clear.  - no evidence of active covid 19 pna or cytokine storm syndrome    # possible sarcoidosis?  - ACE level- normal  - CT scan of lung-reviewed. no findings consistent w/ sarcoidosis    ENDOCRINOLOGY  #  DM  - UbJ7Z-7.6  - Lantus/ RISS    # Hypothyroidism  - synthroid 50 mcg po daily    PSYCHIATRY  # Depression/Anxiety  - effexor   - buspar     GASTROENTEROLOGY  # 2 cm low-attentuation lesion w/  rim of calcification in left hepatic lobe     OBGYN  #  vaginal prolapse   -Will need an outpatient workup     PLAN  - c/w F  - consult GASTR[facility] for abnormal CT scan   - follow up with ace levels   - outpt evaluation for failure to thrive    dvt  ppx/ lovenox  qd   PT  eval     81 yo female w/ pmhx of depression, unsteady gait, hypothyroidism, diabetes type 2, osteoarthritis admitted to [facility] for mechanical fall, likely secondary to debility from covid 19    GENERAL MEDICINE  # recurrent mechanical falls  - CT brain- no acute infarct, no fx, no tumor  - CT pelvis- no fx  - PT evaluation- recommends [facility]    # failure to thrive   - 30 lb weight loss in the past year    INFECTIOUS DISEASE  # COVID 19  - CXR-  Small calcified granuloma or bone island projects over the left lower lung. The lungs are otherwise clear.  - no evidence of active covid 19 pna or cytokine storm syndrome    # possible sarcoidosis?  - ACE level- normal  - CT scan of lung-reviewed. no findings consistent w/ sarcoidosis    ENDOCRINOLOGY  #  DM  - FbC7W-4.6  - Lantus/ RISS    # Hypothyroidism  - synthroid 50 mcg po daily    PSYCHIATRY  # Depression/Anxiety  - effexor   - buspar     GASTROENTEROLOGY  # 2 cm low-attentuation lesion w/  rim of calcification in left hepatic lobe     OBGYN  #  vaginal prolapse   -Will need an outpatient workup     PLAN  - c/w F  - consult GASTRO for abnormal CT scan   - follow up with ace levels   - outpt evaluation for failure to thrive  - outpt OBGYN evaluatiojn    dvt  ppx/ lovenox  qd   PT  eval     83 yo female w/ pmhx of depression, unsteady gait, hypothyroidism, diabetes type 2, osteoarthritis admitted to [facility] for mechanical fall, likely secondary to debility from covid 19    GENERAL MEDICINE  # recurrent mechanical falls  - CT brain- no acute infarct, no fx, no tumor  - CT pelvis- no fx  - PT evaluation- recommends [facility]    # failure to thrive   - 30 lb weight loss in the past year    INFECTIOUS DISEASE  # COVID 19  - CXR-  Small calcified granuloma or bone island projects over the left lower lung. The lungs are otherwise clear.  - no evidence of active covid 19 pna or cytokine storm syndrome    # possible sarcoidosis?  - ACE level- normal  - CT scan of lung-reviewed. no findings consistent w/ sarcoidosis    ENDOCRINOLOGY  #  DM  - UdR0E-3.6  - Lantus/ RISS    # Hypothyroidism  - synthroid 50 mcg po daily    PSYCHIATRY  # Depression/Anxiety  - effexor   - buspar     GASTROENTEROLOGY  # 2 cm low-attentuation lesion w/  rim of calcification in left hepatic lobe     OBGYN  #  vaginal prolapse   -Will need an outpatient workup     PLAN  - c/w F  - consult GASTRO for abnormal CT scan   - follow up with ace levels   - outpt evaluation for failure to thrive  - outpt OBGYN evaluatiojn    dvt  ppx/ lovenox  qd   PT  eval    - spoke to pts daughter Lizette Victoria @ 12/21  ~ 2pm. updates given   83 yo female w/ pmhx of depression, unsteady gait, hypothyroidism, diabetes type 2, osteoarthritis admitted to [facility] for mechanical fall, likely secondary to debility from covid 19    GENERAL MEDICINE  # recurrent mechanical falls  - CT brain- no acute infarct, no fx, no tumor  - CT pelvis- no fx  - PT evaluation- recommends [facility]    # failure to thrive   - 30 lb weight loss in the past year    INFECTIOUS DISEASE  # COVID 19  - CXR-  Small calcified granuloma or bone island projects over the left lower lung. The lungs are otherwise clear.  - no evidence of active covid 19 pna or cytokine storm syndrome    # possible sarcoidosis?  - ACE level- normal  - CT scan of lung-reviewed. no findings consistent w/ sarcoidosis    ENDOCRINOLOGY  #  DM  - UkA8G-0.6  - Lantus/ RISS    # Hypothyroidism  - synthroid 50 mcg po daily    PSYCHIATRY  # Depression/Anxiety  - effexor   - buspar     GASTROENTEROLOGY  # 2 cm low-attentuation lesion w/  rim of calcification in left hepatic lobe   - [facility] consult     OBGYN  #  vaginal prolapse   -Will need an outpatient workup     PLAN  - c/w F  - consult GASTRO for abnormal CT scan   - follow up with ace levels   - outpt evaluation for failure to thrive  - outpt OBGYN evaluatiojn    dvt  ppx/ lovenox  qd   PT  eval    - spoke to pts daughter Lizette Victoria @ 12/21  ~ 2pm. updates given

## 2022-12-22 LAB
GLUCOSE BLDC GLUCOMTR-MCNC: 118 MG/DL — HIGH (ref 70–99)
GLUCOSE BLDC GLUCOMTR-MCNC: 204 MG/DL — HIGH (ref 70–99)
GLUCOSE BLDC GLUCOMTR-MCNC: 213 MG/DL — HIGH (ref 70–99)
GLUCOSE BLDC GLUCOMTR-MCNC: 99 MG/DL — SIGNIFICANT CHANGE UP (ref 70–99)

## 2022-12-22 PROCEDURE — 99232 SBSQ HOSP IP/OBS MODERATE 35: CPT

## 2022-12-22 RX ADMIN — Medication 150 MILLIGRAM(S): at 11:59

## 2022-12-22 RX ADMIN — SENNA PLUS 2 TABLET(S): 8.6 TABLET ORAL at 21:47

## 2022-12-22 RX ADMIN — Medication 50 MICROGRAM(S): at 06:00

## 2022-12-22 RX ADMIN — Medication 10 MILLIGRAM(S): at 14:29

## 2022-12-22 RX ADMIN — ENOXAPARIN SODIUM 40 MILLIGRAM(S): 100 INJECTION SUBCUTANEOUS at 11:59

## 2022-12-22 RX ADMIN — Medication 2: at 16:31

## 2022-12-22 RX ADMIN — SODIUM CHLORIDE 70 MILLILITER(S): 9 INJECTION INTRAMUSCULAR; INTRAVENOUS; SUBCUTANEOUS at 16:30

## 2022-12-22 RX ADMIN — Medication 10 MILLIGRAM(S): at 21:47

## 2022-12-22 RX ADMIN — INSULIN GLARGINE 4 UNIT(S): 100 INJECTION, SOLUTION SUBCUTANEOUS at 21:46

## 2022-12-22 RX ADMIN — Medication 10 MILLIGRAM(S): at 05:59

## 2022-12-22 NOTE — PROGRESS NOTE ADULT - ASSESSMENT
81 yo female w/ pmhx of depression, unsteady gait, hypothyroidism, diabetes type 2, osteoarthritis admitted to [facility] for mechanical fall, likely secondary to debility from covid 19    # recurrent mechanical falls  - CT brain- no acute infarct, no fx, no tumor  - CT pelvis- no fx  - PT evaluation- recommends [facility]    # failure to thrive   - 30 lb weight loss in the past year    # COVID 19  - CXR-  Small calcified granuloma or bone island projects over the left lower lung. The lungs are otherwise clear.  - no evidence of active covid 19 pna or cytokine storm syndrome    # possible sarcoidosis?  - ACE level- normal  - CT scan of lung-reviewed. no findings consistent w/ sarcoidosis    #DM  - WuY2C-9.6  - Lantus/ RISS    # Hypothyroidism  - synthroid 50 mcg po daily    # Depression/Anxiety  - effexor   - buspar     # 2 cm low-attentuation lesion w/  rim of calcification in left hepatic lobe   - [facility]STR[facility] consulted  - can be worked up outpatient    #  vaginal prolapse   -Will need an outpatient workup     DISPO: can DC to [facility]

## 2022-12-22 NOTE — PROGRESS NOTE ADULT - SUBJECTIVE AND OBJECTIVE BOX
CC: fall (21 Dec 2022 13:36)    INTERVAL HPI/OVERNIGHT EVENTS:  no acute events    Vital Signs Last 24 Hrs  T(C): 36.9 (22 Dec 2022 08:28), Max: 36.9 (21 Dec 2022 23:30)  T(F): 98.5 (22 Dec 2022 08:28), Max: 98.5 (21 Dec 2022 23:30)  HR: 74 (22 Dec 2022 08:28) (74 - 76)  BP: 122/73 (22 Dec 2022 08:28) (116/71 - 128/80)  BP(mean): --  RR: 18 (22 Dec 2022 08:28) (17 - 18)  SpO2: 94% (22 Dec 2022 08:28) (92% - 94%)    Parameters below as of 22 Dec 2022 08:28  Patient On (Oxygen Delivery Method): room air    PHYSICAL EXAM:  General: in no acute distress  Eyes: PERRLA, EOMI; conjunctiva and sclera clear  Head: Normocephalic; atraumatic  ENMT: No nasal discharge; airway clear  Neck: Supple; non tender; no masses  Respiratory: No wheezes, rales or rhonchi  Cardiovascular: Regular rate and rhythm. S1 and S2 Normal; No murmurs, gallops or rubs  Gastrointestinal: Soft non-tender non-distended; Normal bowel sounds  Genitourinary: No costovertebral angle tenderness  Extremities: Normal range of motion, No clubbing, cyanosis or edema  Vascular: Peripheral pulses palpable 2+ bilaterally  Neurological: Alert and oriented to person, not situation  Skin: Warm and dry. No acute rash  Psychiatric: Cooperative and appropriate    I&O's Detail    21 Dec 2022 07:01  -  22 Dec 2022 07:00  --------------------------------------------------------  IN:    sodium chloride 0.9%: 840 mL  Total IN: 840 mL    OUT:    Voided (mL): 1250 mL  Total OUT: 1250 mL    Total NET: -410 mL                        13.5   3.68  )-----------( 150      ( 21 Dec 2022 07:03 )             42.3     21 Dec 2022 07:03    139    |  108    |  7      ----------------------------<  100    3.8     |  26     |  0.56     Ca    8.4        21 Dec 2022 07:03    CAPILLARY BLOOD GLUCOSE  POCT Blood Glucose.: 204 mg/dL (22 Dec 2022 16:21)  POCT Blood Glucose.: 99 mg/dL (22 Dec 2022 11:41)  POCT Blood Glucose.: 118 mg/dL (22 Dec 2022 08:39)  POCT Blood Glucose.: 180 mg/dL (21 Dec 2022 21:45)    MEDICATIONS  (STANDING):  busPIRone 10 milliGRAM(s) Oral three times a day  dextrose 5%. 1000 milliLiter(s) (50 mL/Hr) IV Continuous <Continuous>  dextrose 5%. 1000 milliLiter(s) (100 mL/Hr) IV Continuous <Continuous>  dextrose 5%. 1000 milliLiter(s) (50 mL/Hr) IV Continuous <Continuous>  dextrose 50% Injectable 25 Gram(s) IV Push once  dextrose 50% Injectable 12.5 Gram(s) IV Push once  dextrose 50% Injectable 25 Gram(s) IV Push once  enoxaparin Injectable 40 milliGRAM(s) SubCutaneous every 24 hours  glucagon  Injectable 1 milliGRAM(s) IntraMuscular once  insulin glargine Injectable (LANTUS) 4 Unit(s) SubCutaneous at bedtime  insulin lispro (ADMELOG) corrective regimen sliding scale   SubCutaneous three times a day before meals  levothyroxine 50 MICROGram(s) Oral daily  senna 2 Tablet(s) Oral at bedtime  sodium chloride 0.9%. 1000 milliLiter(s) (70 mL/Hr) IV Continuous <Continuous>  venlafaxine XR. 150 milliGRAM(s) Oral daily    MEDICATIONS  (PRN):  acetaminophen     Tablet .. 650 milliGRAM(s) Oral every 6 hours PRN Mild Pain (1 - 3)  dextrose Oral Gel 15 Gram(s) Oral once PRN Blood Glucose LESS THAN 70 milliGRAM(s)/deciliter  dextrose Oral Gel 15 Gram(s) Oral once PRN Blood Glucose LESS THAN 70 milliGRAM(s)/deciliter      RADIOLOGY & ADDITIONAL TESTS:

## 2022-12-23 ENCOUNTER — TRANSCRIPTION ENCOUNTER (OUTPATIENT)
Age: 82
End: 2022-12-23

## 2022-12-23 LAB
FLUAV AG NPH QL: SIGNIFICANT CHANGE UP
FLUBV AG NPH QL: SIGNIFICANT CHANGE UP
GLUCOSE BLDC GLUCOMTR-MCNC: 105 MG/DL — HIGH (ref 70–99)
GLUCOSE BLDC GLUCOMTR-MCNC: 128 MG/DL — HIGH (ref 70–99)
GLUCOSE BLDC GLUCOMTR-MCNC: 143 MG/DL — HIGH (ref 70–99)
GLUCOSE BLDC GLUCOMTR-MCNC: 98 MG/DL — SIGNIFICANT CHANGE UP (ref 70–99)
SARS-COV-2 RNA SPEC QL NAA+PROBE: DETECTED

## 2022-12-23 PROCEDURE — 99232 SBSQ HOSP IP/OBS MODERATE 35: CPT

## 2022-12-23 RX ORDER — ASPIRIN/CALCIUM CARB/MAGNESIUM 324 MG
1 TABLET ORAL
Qty: 30 | Refills: 0
Start: 2022-12-23 | End: 2023-01-21

## 2022-12-23 RX ORDER — CHOLECALCIFEROL (VITAMIN D3) 125 MCG
1 CAPSULE ORAL
Qty: 0 | Refills: 0 | DISCHARGE

## 2022-12-23 RX ADMIN — Medication 10 MILLIGRAM(S): at 22:40

## 2022-12-23 RX ADMIN — Medication 10 MILLIGRAM(S): at 06:08

## 2022-12-23 RX ADMIN — Medication 150 MILLIGRAM(S): at 11:55

## 2022-12-23 RX ADMIN — SODIUM CHLORIDE 70 MILLILITER(S): 9 INJECTION INTRAMUSCULAR; INTRAVENOUS; SUBCUTANEOUS at 19:58

## 2022-12-23 RX ADMIN — Medication 50 MICROGRAM(S): at 06:08

## 2022-12-23 RX ADMIN — Medication 10 MILLIGRAM(S): at 16:59

## 2022-12-23 RX ADMIN — ENOXAPARIN SODIUM 40 MILLIGRAM(S): 100 INJECTION SUBCUTANEOUS at 11:55

## 2022-12-23 RX ADMIN — INSULIN GLARGINE 4 UNIT(S): 100 INJECTION, SOLUTION SUBCUTANEOUS at 23:36

## 2022-12-23 NOTE — DISCHARGE NOTE PROVIDER - NSDCMRMEDTOKEN_GEN_ALL_CORE_FT
Aspi-Cor 81 mg oral delayed release tablet: 1 tab(s) orally once a day   busPIRone 10 mg oral tablet: orally 3 times a day  docusate sodium 100 mg oral capsule: 1 cap(s) orally once a day  Effexor  mg oral capsule, extended release: 1.5 cap(s) orally once a day  levothyroxine 50 mcg (0.05 mg) oral tablet: 1 tab(s) orally once a day  metFORMIN 1000 mg oral tablet: 1 tab(s) orally 2 times a day       senna oral tablet: 2 tab(s) orally once a day (at bedtime)  Vitamin D3 50 mcg (2000 intl units) oral tablet: 1 tab(s) orally once a day

## 2022-12-23 NOTE — PROGRESS NOTE ADULT - ASSESSMENT
83 yo female w/ pmhx of depression, unsteady gait, hypothyroidism, diabetes type 2, osteoarthritis admitted to [facility] for mechanical fall, likely secondary to debility from covid 19    # recurrent mechanical falls  - CT brain- no acute infarct, no fx, no tumor  - CT pelvis- no fx  - PT evaluation- recommends [facility]    # failure to thrive   - 30 lb weight loss in the past year    # COVID 19  - CXR-  Small calcified granuloma or bone island projects over the left lower lung. The lungs are otherwise clear.  - no evidence of active covid 19 pna or cytokine storm syndrome    # possible sarcoidosis?  - ACE level- normal  - CT scan of lung-reviewed. no findings consistent w/ sarcoidosis    #DM  - TjX6R-8.6  - Lantus/ RISS    # Hypothyroidism  - synthroid 50 mcg po daily    # Depression/Anxiety  - effexor   - buspar     # 2 cm low-attentuation lesion w/  rim of calcification in left hepatic lobe   - [facility]STR[facility] consulted  - can be worked up outpatient    #  vaginal prolapse   -Will need an outpatient workup     DISPO: can DC to [facility] 83 yo female w/ pmhx of depression, unsteady gait, hypothyroidism, diabetes type 2, osteoarthritis admitted to [facility] for mechanical fall, likely secondary to debility from covid 19    # recurrent mechanical falls  - CT brain- no acute infarct, no fx, no tumor  - CT pelvis- no fx  - PT evaluation- recommends [facility]    # COVID 19 without pneumonia  - CXR-  Small calcified granuloma or bone island projects over the left lower lung. The lungs are otherwise clear.  - no evidence of active covid 19 pna or cytokine storm syndrome    #DM II on metformin at home  - WlC2X-2.6  - Lantus/ RISS    # Hypothyroidism  - synthroid 50 mcg po daily    # Depression/Anxiety  - effexor   - buspar     # 2 cm low-attentuation lesion w/  rim of calcification in left hepatic lobe   - as per colleague - gastro consulted - they were not called however - will defer to outpatient  - can be worked up outpatient    #  vaginal prolapse   -Will need an outpatient workup     #malnutrition  - 30 lb weight loss in a year  - liberalized diet  - added glucerna to diets    DISPO: can DC to [facility] 83 yo female w/ pmhx of depression, unsteady gait, hypothyroidism, diabetes type 2, osteoarthritis admitted to [facility] for mechanical fall, likely secondary to debility from covid 19    # recurrent mechanical falls  - CT brain- no acute infarct, no fx, no tumor  - CT pelvis- no fx  - PT evaluation- recommends [facility]    # COVID 19 without pneumonia  - CXR-  Small calcified granuloma or bone island projects over the left lower lung. The lungs are otherwise clear.  - no evidence of active covid 19 pna or cytokine storm syndrome    #DM II on metformin at home  - DbC1S-1.6  - Lantus/ RISS    # Hypothyroidism  - synthroid 50 mcg po daily    # Depression/Anxiety  - effexor   - buspar     # 2 cm low-attentuation lesion w/  rim of calcification in left hepatic lobe   - as per colleague - gastro consulted - they were not called however - will defer to outpatient  - can be worked up outpatient    #  vaginal prolapse   -Will need an outpatient workup     #malnutrition  - 30 lb weight loss in a year  - liberalized diet  - added glucerna to diets    DISPO: can DC to [facility]  discussed with Lizette over the phone - answered all questions

## 2022-12-23 NOTE — DISCHARGE NOTE PROVIDER - ATTENDING DISCHARGE PHYSICAL EXAMINATION:
PHYSICAL EXAM:  General: in no acute distress  Eyes: PERRLA, EOMI; conjunctiva and sclera clear  Head: Normocephalic; atraumatic  ENMT: No nasal discharge; airway clear  Neck: Supple; non tender; no masses  Respiratory: No wheezes, rales or rhonchi  Cardiovascular: Regular rate and rhythm. S1 and S2 Normal; No murmurs, gallops or rubs  Gastrointestinal: Soft non-tender non-distended; Normal bowel sounds  Genitourinary: No costovertebral angle tenderness  Extremities: Normal range of motion, No clubbing, cyanosis or edema  Vascular: Peripheral pulses palpable 2+ bilaterally  Neurological: Alert and oriented to place, year, and situation  Skin: Warm and dry. No acute rash  Psychiatric: Cooperative and appropriate

## 2022-12-23 NOTE — DISCHARGE NOTE PROVIDER - NSDCCPCAREPLAN_GEN_ALL_CORE_FT
PRINCIPAL DISCHARGE DIAGNOSIS  Diagnosis: 2019 novel coronavirus disease (COVID-19)  Assessment and Plan of Treatment: without pneumonia. Will not need any further treatment on discharge.      SECONDARY DISCHARGE DIAGNOSES  Diagnosis: Fall  Assessment and Plan of Treatment: PT recommended [facility]; patient for [facility] placement.     PRINCIPAL DISCHARGE DIAGNOSIS  Diagnosis: 2019 novel coronavirus disease (COVID-19)  Assessment and Plan of Treatment: without pneumonia. Will not need any further treatment on discharge.      SECONDARY DISCHARGE DIAGNOSES  Diagnosis: Fall  Assessment and Plan of Treatment: PT recommended [facility]; patient for [facility] placement.    Diagnosis: Granuloma of liver  Assessment and Plan of Treatment: can follow up with primary or gastroenterologist on discharge

## 2022-12-23 NOTE — DISCHARGE NOTE PROVIDER - DETAILS OF MALNUTRITION DIAGNOSIS/DIAGNOSES
This patient has been assessed with a concern for Malnutrition and was treated during this hospitalization for the following Nutrition diagnosis/diagnoses:     -  12/20/2022: Moderate protein-calorie malnutrition

## 2022-12-23 NOTE — PROGRESS NOTE ADULT - NUTRITIONAL ASSESSMENT
This patient has been assessed with a concern for Malnutrition and has been determined to have a diagnosis/diagnoses of Moderate protein-calorie malnutrition.    This patient is being managed with:   Diet Consistent Carbohydrate w/Evening Snack-  Soft and Bite Sized (SOFTBTSZ)  Entered: Dec 17 2022 12:53PM    The following pending diet order is being considered for treatment of Moderate protein-calorie malnutrition:  Diet Soft and Bite Sized-  Consistent Carbohydrate {Evening Snack}  Supplement Feeding Modality:  Oral  Glucerna Shake Cans or Servings Per Day:  1       Frequency:  Two Times a day  Entered: Dec 20 2022 12:49PM  
This patient has been assessed with a concern for Malnutrition and has been determined to have a diagnosis/diagnoses of Moderate protein-calorie malnutrition.    This patient is being managed with:   Diet Soft and Bite Sized-  Consistent Carbohydrate {Evening Snack}  Supplement Feeding Modality:  Oral  Glucerna Shake Cans or Servings Per Day:  1       Frequency:  Two Times a day  Entered: Dec 20 2022 12:49PM    

## 2022-12-23 NOTE — PROGRESS NOTE ADULT - SUBJECTIVE AND OBJECTIVE BOX
CC: fall (21 Dec 2022 13:36)    INTERVAL HPI/OVERNIGHT EVENTS:  no acute events    Vital Signs Last 24 Hrs  T(C): 36.6 (12-23-22 @ 11:48), Max: 37.5 (12-22-22 @ 17:33)  T(F): 97.9 (12-23-22 @ 11:48), Max: 99.5 (12-22-22 @ 17:33)  HR: 73 (12-23-22 @ 11:48) (73 - 79)  BP: 134/83 (12-23-22 @ 11:48) (109/70 - 134/83)  BP(mean): --  RR: 18 (12-23-22 @ 11:48) (17 - 18)  SpO2: 93% (12-23-22 @ 11:48) (92% - 94%)    PHYSICAL EXAM:  General: in no acute distress  Eyes: PERRLA, EOMI; conjunctiva and sclera clear  Head: Normocephalic; atraumatic  ENMT: No nasal discharge; airway clear  Neck: Supple; non tender; no masses  Respiratory: No wheezes, rales or rhonchi  Cardiovascular: Regular rate and rhythm. S1 and S2 Normal; No murmurs, gallops or rubs  Gastrointestinal: Soft non-tender non-distended; Normal bowel sounds  Genitourinary: No costovertebral angle tenderness  Extremities: Normal range of motion, No clubbing, cyanosis or edema  Vascular: Peripheral pulses palpable 2+ bilaterally  Neurological: Alert and oriented to person, not situation  Skin: Warm and dry. No acute rash  Psychiatric: Cooperative and appropriate    I&O's Detail    21 Dec 2022 07:01  -  22 Dec 2022 07:00  --------------------------------------------------------  IN:    sodium chloride 0.9%: 840 mL  Total IN: 840 mL    OUT:    Voided (mL): 1250 mL  Total OUT: 1250 mL    Total NET: -410 mL                        13.5   3.68  )-----------( 150      ( 21 Dec 2022 07:03 )             42.3     21 Dec 2022 07:03    139    |  108    |  7      ----------------------------<  100    3.8     |  26     |  0.56     Ca    8.4        21 Dec 2022 07:03    CAPILLARY BLOOD GLUCOSE  POCT Blood Glucose.: 204 mg/dL (22 Dec 2022 16:21)  POCT Blood Glucose.: 99 mg/dL (22 Dec 2022 11:41)  POCT Blood Glucose.: 118 mg/dL (22 Dec 2022 08:39)  POCT Blood Glucose.: 180 mg/dL (21 Dec 2022 21:45)    MEDICATIONS  (STANDING):  busPIRone 10 milliGRAM(s) Oral three times a day  dextrose 5%. 1000 milliLiter(s) (50 mL/Hr) IV Continuous <Continuous>  dextrose 5%. 1000 milliLiter(s) (100 mL/Hr) IV Continuous <Continuous>  dextrose 5%. 1000 milliLiter(s) (50 mL/Hr) IV Continuous <Continuous>  dextrose 50% Injectable 25 Gram(s) IV Push once  dextrose 50% Injectable 12.5 Gram(s) IV Push once  dextrose 50% Injectable 25 Gram(s) IV Push once  enoxaparin Injectable 40 milliGRAM(s) SubCutaneous every 24 hours  glucagon  Injectable 1 milliGRAM(s) IntraMuscular once  insulin glargine Injectable (LANTUS) 4 Unit(s) SubCutaneous at bedtime  insulin lispro (ADMELOG) corrective regimen sliding scale   SubCutaneous three times a day before meals  levothyroxine 50 MICROGram(s) Oral daily  senna 2 Tablet(s) Oral at bedtime  sodium chloride 0.9%. 1000 milliLiter(s) (70 mL/Hr) IV Continuous <Continuous>  venlafaxine XR. 150 milliGRAM(s) Oral daily    MEDICATIONS  (PRN):  acetaminophen     Tablet .. 650 milliGRAM(s) Oral every 6 hours PRN Mild Pain (1 - 3)  dextrose Oral Gel 15 Gram(s) Oral once PRN Blood Glucose LESS THAN 70 milliGRAM(s)/deciliter  dextrose Oral Gel 15 Gram(s) Oral once PRN Blood Glucose LESS THAN 70 milliGRAM(s)/deciliter      RADIOLOGY & ADDITIONAL TESTS:

## 2022-12-24 LAB
GLUCOSE BLDC GLUCOMTR-MCNC: 100 MG/DL — HIGH (ref 70–99)
GLUCOSE BLDC GLUCOMTR-MCNC: 138 MG/DL — HIGH (ref 70–99)
GLUCOSE BLDC GLUCOMTR-MCNC: 154 MG/DL — HIGH (ref 70–99)
GLUCOSE BLDC GLUCOMTR-MCNC: 191 MG/DL — HIGH (ref 70–99)

## 2022-12-24 PROCEDURE — 99232 SBSQ HOSP IP/OBS MODERATE 35: CPT

## 2022-12-24 RX ADMIN — Medication 10 MILLIGRAM(S): at 05:31

## 2022-12-24 RX ADMIN — Medication 150 MILLIGRAM(S): at 11:41

## 2022-12-24 RX ADMIN — Medication 10 MILLIGRAM(S): at 21:48

## 2022-12-24 RX ADMIN — Medication 10 MILLIGRAM(S): at 13:49

## 2022-12-24 RX ADMIN — SODIUM CHLORIDE 70 MILLILITER(S): 9 INJECTION INTRAMUSCULAR; INTRAVENOUS; SUBCUTANEOUS at 11:44

## 2022-12-24 RX ADMIN — ENOXAPARIN SODIUM 40 MILLIGRAM(S): 100 INJECTION SUBCUTANEOUS at 11:41

## 2022-12-24 RX ADMIN — Medication 50 MICROGRAM(S): at 05:31

## 2022-12-24 RX ADMIN — INSULIN GLARGINE 4 UNIT(S): 100 INJECTION, SOLUTION SUBCUTANEOUS at 21:49

## 2022-12-24 RX ADMIN — Medication 1: at 17:44

## 2022-12-24 RX ADMIN — SENNA PLUS 2 TABLET(S): 8.6 TABLET ORAL at 21:48

## 2022-12-24 NOTE — PROGRESS NOTE ADULT - ASSESSMENT
83 yo female w/ pmhx of depression, unsteady gait, hypothyroidism, diabetes type 2, osteoarthritis admitted to [facility] for mechanical fall, likely secondary to debility from covid 19      # recurrent mechanical falls  - CT brain- no acute infarct, no fx, no tumor  - CT pelvis- no fx  - PT evaluation- recommends [facility]    # COVID 19 without pneumonia  - CXR-  Small calcified granuloma or bone island projects over the left lower lung. The lungs are otherwise clear.  - no evidence of active covid 19 pna or cytokine storm syndrome    #DM II on metformin at home  - [id number]-6.6  - Lantus/ RISS    # Hypothyroidism  - synthroid 50 mcg po daily    # Depression/Anxiety  - effexor   - buspar     # 2 cm low-attentuation lesion w/  rim of calcification in left hepatic lobe   - as per colleague - gastro consulted - they were not called however - will defer to outpatient  - can be worked up outpatient    #  vaginal prolapse   -Will need an outpatient workup     #malnutrition  - 30 lb weight loss in a year  - liberalized diet  - added glucerna to diets    DISPO: can DC to [facility]  discussed with Lizette over the phone - answered all questions 83 yo female w/ pmhx of depression, unsteady gait, hypothyroidism, diabetes type 2, osteoarthritis admitted to [facility] for mechanical fall, likely secondary to debility from covid 19      # recurrent mechanical falls  - CT brain- no acute infarct, no fx, no tumor  - CT pelvis- no fx  - PT evaluation- recommends [facility]    # COVID 19 without pneumonia  - CXR-  Small calcified granuloma or bone island projects over the left lower lung. The lungs are otherwise clear.  - no evidence of active covid 19 pna or cytokine storm syndrome.     Stop IVF.     #DM II on metformin at home  - BbK6O-6.6  - Lantus/ RISS    # Hypothyroidism  - synthroid 50 mcg po daily    # Depression/Anxiety  - effexor   - buspar     # 2 cm low-attentuation lesion w/  rim of calcification in left hepatic lobe   - as per colleague - gastro consulted - they were not called however - will defer to outpatient  - can be worked up outpatient    #  vaginal prolapse   -Will need an outpatient workup     #malnutrition  - 30 lb weight loss in a year  - liberalized diet  - added glucerna to diets    DISPO: can DC to [facility] on Monday.  Family at bedside.

## 2022-12-24 NOTE — PROGRESS NOTE ADULT - SUBJECTIVE AND OBJECTIVE BOX
INTERVAL HPI/OVERNIGHT EVENTS:  Pt seen and examined at bedside.     Allergies/Intolerance: No Known Allergies      MEDICATIONS  (STANDING):  busPIRone 10 milliGRAM(s) Oral three times a day  dextrose 5%. 1000 milliLiter(s) (50 mL/Hr) IV Continuous <Continuous>  dextrose 5%. 1000 milliLiter(s) (100 mL/Hr) IV Continuous <Continuous>  dextrose 5%. 1000 milliLiter(s) (50 mL/Hr) IV Continuous <Continuous>  dextrose 50% Injectable 25 Gram(s) IV Push once  dextrose 50% Injectable 12.5 Gram(s) IV Push once  dextrose 50% Injectable 25 Gram(s) IV Push once  enoxaparin Injectable 40 milliGRAM(s) SubCutaneous every 24 hours  glucagon  Injectable 1 milliGRAM(s) IntraMuscular once  insulin glargine Injectable (LANTUS) 4 Unit(s) SubCutaneous at bedtime  insulin lispro (ADMELOG) corrective regimen sliding scale   SubCutaneous three times a day before meals  levothyroxine 50 MICROGram(s) Oral daily  senna 2 Tablet(s) Oral at bedtime  sodium chloride 0.9%. 1000 milliLiter(s) (70 mL/Hr) IV Continuous <Continuous>  venlafaxine XR. 150 milliGRAM(s) Oral daily    MEDICATIONS  (PRN):  acetaminophen     Tablet .. 650 milliGRAM(s) Oral every 6 hours PRN Mild Pain (1 - 3)  dextrose Oral Gel 15 Gram(s) Oral once PRN Blood Glucose LESS THAN 70 milliGRAM(s)/deciliter  dextrose Oral Gel 15 Gram(s) Oral once PRN Blood Glucose LESS THAN 70 milliGRAM(s)/deciliter        ROS: all systems reviewed and wnl      PHYSICAL EXAMINATION:  Vital Signs Last 24 Hrs  T(C): 37.1 (24 Dec 2022 11:39), Max: 37.1 (23 Dec 2022 17:11)  T(F): 98.7 (24 Dec 2022 11:39), Max: 98.7 (23 Dec 2022 17:11)  HR: 83 (24 Dec 2022 11:39) (80 - 83)  BP: 123/75 (24 Dec 2022 11:39) (123/75 - 130/78)  BP(mean): --  RR: 18 (24 Dec 2022 11:39) (17 - 18)  SpO2: 94% (24 Dec 2022 11:39) (94% - 95%)    Parameters below as of 24 Dec 2022 11:39  Patient On (Oxygen Delivery Method): room air      CAPILLARY BLOOD GLUCOSE      POCT Blood Glucose.: 138 mg/dL (24 Dec 2022 11:37)  POCT Blood Glucose.: 100 mg/dL (24 Dec 2022 07:56)  POCT Blood Glucose.: 128 mg/dL (23 Dec 2022 21:16)  POCT Blood Glucose.: 105 mg/dL (23 Dec 2022 16:08)      12-23 @ 07:01  -  12-24 @ 07:00  --------------------------------------------------------  IN: 0 mL / OUT: 800 mL / NET: -800 mL        GENERAL: stable, no fevers, CP or SOB  NECK: supple, No JVD  CHEST/LUNG: clear to auscultation bilaterally; no rales, rhonchi, or wheezing b/l  HEART: normal S1, S2  ABDOMEN: BS+, soft, ND, NT   EXTREMITIES:  pulses palpable; no clubbing, cyanosis, or edema b/l LEs      LABS:

## 2022-12-25 LAB
GLUCOSE BLDC GLUCOMTR-MCNC: 121 MG/DL — HIGH (ref 70–99)
GLUCOSE BLDC GLUCOMTR-MCNC: 126 MG/DL — HIGH (ref 70–99)
GLUCOSE BLDC GLUCOMTR-MCNC: 173 MG/DL — HIGH (ref 70–99)
GLUCOSE BLDC GLUCOMTR-MCNC: 179 MG/DL — HIGH (ref 70–99)

## 2022-12-25 PROCEDURE — 99232 SBSQ HOSP IP/OBS MODERATE 35: CPT

## 2022-12-25 RX ADMIN — Medication 150 MILLIGRAM(S): at 12:32

## 2022-12-25 RX ADMIN — SENNA PLUS 2 TABLET(S): 8.6 TABLET ORAL at 21:38

## 2022-12-25 RX ADMIN — Medication 50 MICROGRAM(S): at 05:58

## 2022-12-25 RX ADMIN — Medication 10 MILLIGRAM(S): at 05:58

## 2022-12-25 RX ADMIN — Medication 1: at 16:51

## 2022-12-25 RX ADMIN — Medication 10 MILLIGRAM(S): at 15:41

## 2022-12-25 RX ADMIN — ENOXAPARIN SODIUM 40 MILLIGRAM(S): 100 INJECTION SUBCUTANEOUS at 12:32

## 2022-12-25 RX ADMIN — INSULIN GLARGINE 4 UNIT(S): 100 INJECTION, SOLUTION SUBCUTANEOUS at 21:36

## 2022-12-25 RX ADMIN — Medication 10 MILLIGRAM(S): at 21:38

## 2022-12-25 NOTE — PROGRESS NOTE ADULT - SUBJECTIVE AND OBJECTIVE BOX
INTERVAL HPI/OVERNIGHT EVENTS:  Pt seen and examined at bedside.     Allergies/Intolerance: No Known Allergies      MEDICATIONS  (STANDING):  busPIRone 10 milliGRAM(s) Oral three times a day  dextrose 5%. 1000 milliLiter(s) (50 mL/Hr) IV Continuous <Continuous>  dextrose 5%. 1000 milliLiter(s) (50 mL/Hr) IV Continuous <Continuous>  dextrose 5%. 1000 milliLiter(s) (100 mL/Hr) IV Continuous <Continuous>  dextrose 50% Injectable 25 Gram(s) IV Push once  dextrose 50% Injectable 12.5 Gram(s) IV Push once  dextrose 50% Injectable 25 Gram(s) IV Push once  enoxaparin Injectable 40 milliGRAM(s) SubCutaneous every 24 hours  glucagon  Injectable 1 milliGRAM(s) IntraMuscular once  insulin glargine Injectable (LANTUS) 4 Unit(s) SubCutaneous at bedtime  insulin lispro (ADMELOG) corrective regimen sliding scale   SubCutaneous three times a day before meals  levothyroxine 50 MICROGram(s) Oral daily  senna 2 Tablet(s) Oral at bedtime  venlafaxine XR. 150 milliGRAM(s) Oral daily    MEDICATIONS  (PRN):  acetaminophen     Tablet .. 650 milliGRAM(s) Oral every 6 hours PRN Mild Pain (1 - 3)  dextrose Oral Gel 15 Gram(s) Oral once PRN Blood Glucose LESS THAN 70 milliGRAM(s)/deciliter  dextrose Oral Gel 15 Gram(s) Oral once PRN Blood Glucose LESS THAN 70 milliGRAM(s)/deciliter        ROS: all systems reviewed and wnl      PHYSICAL EXAMINATION:  Vital Signs Last 24 Hrs  T(C): 36.9 (25 Dec 2022 05:23), Max: 37.1 (24 Dec 2022 11:39)  T(F): 98.4 (25 Dec 2022 05:23), Max: 98.7 (24 Dec 2022 11:39)  HR: 83 (25 Dec 2022 05:23) (82 - 84)  BP: 150/71 (25 Dec 2022 05:23) (119/75 - 150/71)  BP(mean): --  RR: 18 (25 Dec 2022 05:23) (18 - 18)  SpO2: 94% (25 Dec 2022 05:23) (93% - 94%)    Parameters below as of 24 Dec 2022 11:39  Patient On (Oxygen Delivery Method): room air      CAPILLARY BLOOD GLUCOSE      POCT Blood Glucose.: 121 mg/dL (25 Dec 2022 07:39)  POCT Blood Glucose.: 154 mg/dL (24 Dec 2022 21:34)  POCT Blood Glucose.: 191 mg/dL (24 Dec 2022 17:08)  POCT Blood Glucose.: 138 mg/dL (24 Dec 2022 11:37)      12-24 @ 07:01  -  12-25 @ 07:00  --------------------------------------------------------  IN: 0 mL / OUT: 800 mL / NET: -800 mL        GENERAL: stable, comfortable on RA, no new complaints.    NECK: supple, No JVD  CHEST/LUNG: clear to auscultation bilaterally; no rales, rhonchi, or wheezing b/l  HEART: normal S1, S2  ABDOMEN: BS+, soft, ND, NT   EXTREMITIES:  pulses palpable; no clubbing, cyanosis, or edema b/l LEs    LABS:

## 2022-12-25 NOTE — PROGRESS NOTE ADULT - ASSESSMENT
83 yo female w/ pmhx of depression, unsteady gait, hypothyroidism, diabetes type 2, osteoarthritis admitted to [facility] for mechanical fall, likely secondary to debility from covid 19      # recurrent mechanical falls  - CT brain- no acute infarct, no fx, no tumor  - CT pelvis- no fx  - PT evaluation- recommends [facility]    # COVID 19 without pneumonia  - CXR-  Small calcified granuloma or bone island projects over the left lower lung. The lungs are otherwise clear.  - no evidence of active covid 19 pna or cytokine storm syndrome.     Stop IVF.     #DM II on metformin at home  - BaH8V-3.6  - Lantus/ RISS    # Hypothyroidism  - synthroid 50 mcg po daily    # Depression/Anxiety  - effexor   - buspar     # 2 cm low-attentuation lesion w/  rim of calcification in left hepatic lobe   - as per colleague - gastro consulted - they were not called however - will defer to outpatient  - can be worked up outpatient    #  vaginal prolapse   -Will need an outpatient workup     #malnutrition  - 30 lb weight loss in a year  - liberalized diet  - added glucerna to diets    DISPO: can DC to [facility] on Monday.  Family at bedside.  81 yo female w/ pmhx of depression, unsteady gait, hypothyroidism, diabetes type 2, osteoarthritis admitted to [facility] for mechanical fall, likely secondary to debility from covid 19      # recurrent mechanical falls  - CT brain- no acute infarct, no fx, no tumor  - CT pelvis- no fx  - PT evaluation- recommends [facility]    # COVID 19 without pneumonia  - CXR-  Small calcified granuloma or bone island projects over the left lower lung. The lungs are otherwise clear.  - no evidence of active covid 19 pna or cytokine storm syndrome.     Stop IVF.     #DM II on metformin at home  - [id number]-6.6  - Lantus/ RISS    # Hypothyroidism  - synthroid 50 mcg po daily    # Depression/Anxiety  - effexor   - buspar     # 2 cm low-attentuation lesion w/  rim of calcification in left hepatic lobe   - as per colleague - gastro consulted - they were not called however - will defer to outpatient  - can be worked up outpatient    #  vaginal prolapse   -Will need an outpatient workup     #malnutrition  - 30 lb weight loss in a year  - liberalized diet  - added glucerna to diets    DISPO: Stable for discharge to [facility] on Monday.  Family at bedside.

## 2022-12-26 LAB
FLUAV AG NPH QL: SIGNIFICANT CHANGE UP
FLUBV AG NPH QL: SIGNIFICANT CHANGE UP
GLUCOSE BLDC GLUCOMTR-MCNC: 114 MG/DL — HIGH (ref 70–99)
GLUCOSE BLDC GLUCOMTR-MCNC: 130 MG/DL — HIGH (ref 70–99)
GLUCOSE BLDC GLUCOMTR-MCNC: 135 MG/DL — HIGH (ref 70–99)
GLUCOSE BLDC GLUCOMTR-MCNC: 179 MG/DL — HIGH (ref 70–99)
SARS-COV-2 RNA SPEC QL NAA+PROBE: DETECTED

## 2022-12-26 PROCEDURE — 99232 SBSQ HOSP IP/OBS MODERATE 35: CPT

## 2022-12-26 RX ADMIN — Medication 150 MILLIGRAM(S): at 11:57

## 2022-12-26 RX ADMIN — ENOXAPARIN SODIUM 40 MILLIGRAM(S): 100 INJECTION SUBCUTANEOUS at 11:57

## 2022-12-26 RX ADMIN — Medication 50 MICROGRAM(S): at 05:49

## 2022-12-26 RX ADMIN — Medication 10 MILLIGRAM(S): at 05:49

## 2022-12-26 RX ADMIN — INSULIN GLARGINE 4 UNIT(S): 100 INJECTION, SOLUTION SUBCUTANEOUS at 22:10

## 2022-12-26 RX ADMIN — SENNA PLUS 2 TABLET(S): 8.6 TABLET ORAL at 22:10

## 2022-12-26 RX ADMIN — Medication 10 MILLIGRAM(S): at 13:21

## 2022-12-26 RX ADMIN — Medication 10 MILLIGRAM(S): at 22:09

## 2022-12-26 NOTE — PROGRESS NOTE ADULT - ASSESSMENT
83 yo female w/ pmhx of depression, unsteady gait, hypothyroidism, diabetes type 2, osteoarthritis admitted to [facility] for mechanical fall, likely secondary to debility from covid 19      # recurrent mechanical falls  - CT brain- no acute infarct, no fx, no tumor  - CT pelvis- no fx  - PT evaluation- recommends [facility]    # COVID 19 without pneumonia  - CXR-  Small calcified granuloma or bone island projects over the left lower lung. The lungs are otherwise clear.  - no evidence of active covid 19 pna or cytokine storm syndrome.     Stop IVF.     #DM II on metformin at home  - HgY6Z-6.6  - Lantus/ RISS    # Hypothyroidism  - synthroid 50 mcg po daily    # Depression/Anxiety  - effexor   - buspar     # 2 cm low-attentuation lesion w/  rim of calcification in left hepatic lobe   - as per colleague - gastro consulted - they were not called however - will defer to outpatient  - can be worked up outpatient    #  vaginal prolapse   -Will need an outpatient workup     #malnutrition  - 30 lb weight loss in a year  - liberalized diet  - added glucerna to diets    DISPO: Stable for discharge to [facility] on Monday.  Family at bedside.  83 yo female w/ pmhx of depression, unsteady gait, hypothyroidism, diabetes type 2, osteoarthritis admitted to [facility] for mechanical fall, likely secondary to debility from covid 19      # recurrent mechanical falls  - CT brain- no acute infarct, no fx, no tumor  - CT pelvis- no fx  - PT evaluation- recommends [facility]    # COVID 19 without pneumonia  - CXR-  Small calcified granuloma or bone island projects over the left lower lung. The lungs are otherwise clear.  - no evidence of active covid 19 pna or cytokine storm syndrome.     Stop IVF.     #DM II on metformin at home  - OlB1L-3.6  - Lantus 4 units /day.     # Hypothyroidism  - synthroid 50 mcg po daily    # Depression/Anxiety  - effexor   - buspar     DISPO: Stable for discharge to [facility] on Monday.  Family at bedside.

## 2022-12-26 NOTE — PROGRESS NOTE ADULT - SUBJECTIVE AND OBJECTIVE BOX
INTERVAL HPI/OVERNIGHT EVENTS:  Pt seen and examined at bedside.     Allergies/Intolerance: No Known Allergies      MEDICATIONS  (STANDING):  busPIRone 10 milliGRAM(s) Oral three times a day  dextrose 5%. 1000 milliLiter(s) (50 mL/Hr) IV Continuous <Continuous>  dextrose 5%. 1000 milliLiter(s) (100 mL/Hr) IV Continuous <Continuous>  dextrose 5%. 1000 milliLiter(s) (50 mL/Hr) IV Continuous <Continuous>  dextrose 50% Injectable 25 Gram(s) IV Push once  dextrose 50% Injectable 12.5 Gram(s) IV Push once  dextrose 50% Injectable 25 Gram(s) IV Push once  enoxaparin Injectable 40 milliGRAM(s) SubCutaneous every 24 hours  glucagon  Injectable 1 milliGRAM(s) IntraMuscular once  insulin glargine Injectable (LANTUS) 4 Unit(s) SubCutaneous at bedtime  insulin lispro (ADMELOG) corrective regimen sliding scale   SubCutaneous three times a day before meals  levothyroxine 50 MICROGram(s) Oral daily  senna 2 Tablet(s) Oral at bedtime  venlafaxine XR. 150 milliGRAM(s) Oral daily    MEDICATIONS  (PRN):  acetaminophen     Tablet .. 650 milliGRAM(s) Oral every 6 hours PRN Mild Pain (1 - 3)  dextrose Oral Gel 15 Gram(s) Oral once PRN Blood Glucose LESS THAN 70 milliGRAM(s)/deciliter  dextrose Oral Gel 15 Gram(s) Oral once PRN Blood Glucose LESS THAN 70 milliGRAM(s)/deciliter        ROS: all systems reviewed and wnl      PHYSICAL EXAMINATION:  Vital Signs Last 24 Hrs  T(C): 36.6 (26 Dec 2022 05:43), Max: 37.1 (25 Dec 2022 13:36)  T(F): 97.8 (26 Dec 2022 05:43), Max: 98.8 (25 Dec 2022 13:36)  HR: 88 (26 Dec 2022 05:43) (78 - 88)  BP: 123/78 (26 Dec 2022 05:43) (121/78 - 131/77)  BP(mean): --  RR: 18 (26 Dec 2022 05:43) (18 - 18)  SpO2: 94% (26 Dec 2022 05:43) (93% - 95%)      CAPILLARY BLOOD GLUCOSE      POCT Blood Glucose.: 130 mg/dL (26 Dec 2022 08:19)  POCT Blood Glucose.: 173 mg/dL (25 Dec 2022 21:17)  POCT Blood Glucose.: 179 mg/dL (25 Dec 2022 16:45)  POCT Blood Glucose.: 126 mg/dL (25 Dec 2022 11:44)      12-25 @ 07:01  -  12-26 @ 07:00  --------------------------------------------------------  IN: 50 mL / OUT: 400 mL / NET: -350 mL    12-26 @ 07:01  -  12-26 @ 10:43  --------------------------------------------------------  IN: 0 mL / OUT: 0 mL / NET: 0 mL        GENERAL: stable, frail, tolerating meals, no new complaints.   NECK: supple, No JVD  CHEST/LUNG: clear to auscultation bilaterally; no rales, rhonchi, or wheezing b/l  HEART: normal S1, S2  ABDOMEN: BS+, soft, ND, NT   EXTREMITIES:  pulses palpable; no clubbing, cyanosis, or edema b/l LEs      LABS:

## 2022-12-27 LAB
GLUCOSE BLDC GLUCOMTR-MCNC: 122 MG/DL — HIGH (ref 70–99)
GLUCOSE BLDC GLUCOMTR-MCNC: 124 MG/DL — HIGH (ref 70–99)
GLUCOSE BLDC GLUCOMTR-MCNC: 136 MG/DL — HIGH (ref 70–99)
GLUCOSE BLDC GLUCOMTR-MCNC: 157 MG/DL — HIGH (ref 70–99)

## 2022-12-27 PROCEDURE — 99232 SBSQ HOSP IP/OBS MODERATE 35: CPT

## 2022-12-27 RX ADMIN — ENOXAPARIN SODIUM 40 MILLIGRAM(S): 100 INJECTION SUBCUTANEOUS at 12:32

## 2022-12-27 RX ADMIN — Medication 10 MILLIGRAM(S): at 21:28

## 2022-12-27 RX ADMIN — Medication 10 MILLIGRAM(S): at 05:13

## 2022-12-27 RX ADMIN — Medication 150 MILLIGRAM(S): at 12:32

## 2022-12-27 RX ADMIN — SENNA PLUS 2 TABLET(S): 8.6 TABLET ORAL at 21:28

## 2022-12-27 RX ADMIN — Medication 10 MILLIGRAM(S): at 16:06

## 2022-12-27 RX ADMIN — Medication 1: at 17:34

## 2022-12-27 RX ADMIN — Medication 50 MICROGRAM(S): at 05:12

## 2022-12-27 RX ADMIN — INSULIN GLARGINE 4 UNIT(S): 100 INJECTION, SOLUTION SUBCUTANEOUS at 21:27

## 2022-12-27 NOTE — PROGRESS NOTE ADULT - ASSESSMENT
81 yo female w/ pmhx of depression, unsteady gait, hypothyroidism, diabetes type 2, osteoarthritis admitted to [facility] for mechanical fall, likely secondary to debility from covid 19      # recurrent mechanical falls  - CT brain- no acute infarct, no fx, no tumor  - CT pelvis- no fx  - PT evaluation- recommends [facility]    # COVID 19 without pneumonia  - CXR-  Small calcified granuloma or bone island projects over the left lower lung. The lungs are otherwise clear.  - no evidence of active covid 19 pna or cytokine storm syndrome.     Stop IVF.     #DM II on metformin at home  - [id number]-2.6  - Lantus 4 units /day.     # Hypothyroidism  - synthroid 50 mcg po daily    # Depression/Anxiety  - effexor   - buspar     DISPO: Stable for discharge to [facility] on Monday.  Family at bedside.  81 yo female w/ pmhx of depression, unsteady gait, hypothyroidism, diabetes type 2, osteoarthritis admitted to [facility] for mechanical fall, likely secondary to debility from covid 19      # recurrent mechanical falls: CT brain- no acute infarct, no fx, no tumor  - CT pelvis- no fx, PT evaluation- recommends [facility]    # COVID 19 without pneumonia  - CXR-  Small calcified granuloma or bone island projects over the left lower lung. The lungs are otherwise clear.  - no evidence of active covid 19 pna or cytokine storm syndrome.     Stop IVF.     #DM II on metformin at home  - IvW3G-8.6, Lantus 4 units /day.     # Hypothyroidism, synthroid 50 mcg po daily    # Depression/Anxiety, effexor, buspar     DISPO: Stable for discharge to [facility] on Tuesday.  Family at bedside, agree with rehab.

## 2022-12-27 NOTE — PROGRESS NOTE ADULT - SUBJECTIVE AND OBJECTIVE BOX
INTERVAL HPI/OVERNIGHT EVENTS:  Pt seen and examined at bedside.     Allergies/Intolerance: No Known Allergies      MEDICATIONS  (STANDING):  busPIRone 10 milliGRAM(s) Oral three times a day  dextrose 5%. 1000 milliLiter(s) (50 mL/Hr) IV Continuous <Continuous>  dextrose 5%. 1000 milliLiter(s) (100 mL/Hr) IV Continuous <Continuous>  dextrose 5%. 1000 milliLiter(s) (50 mL/Hr) IV Continuous <Continuous>  dextrose 50% Injectable 25 Gram(s) IV Push once  dextrose 50% Injectable 12.5 Gram(s) IV Push once  dextrose 50% Injectable 25 Gram(s) IV Push once  enoxaparin Injectable 40 milliGRAM(s) SubCutaneous every 24 hours  glucagon  Injectable 1 milliGRAM(s) IntraMuscular once  insulin glargine Injectable (LANTUS) 4 Unit(s) SubCutaneous at bedtime  insulin lispro (ADMELOG) corrective regimen sliding scale   SubCutaneous three times a day before meals  levothyroxine 50 MICROGram(s) Oral daily  senna 2 Tablet(s) Oral at bedtime  venlafaxine XR. 150 milliGRAM(s) Oral daily    MEDICATIONS  (PRN):  acetaminophen     Tablet .. 650 milliGRAM(s) Oral every 6 hours PRN Mild Pain (1 - 3)  dextrose Oral Gel 15 Gram(s) Oral once PRN Blood Glucose LESS THAN 70 milliGRAM(s)/deciliter  dextrose Oral Gel 15 Gram(s) Oral once PRN Blood Glucose LESS THAN 70 milliGRAM(s)/deciliter        ROS: all systems reviewed and wnl      PHYSICAL EXAMINATION:  Vital Signs Last 24 Hrs  T(C): 36.8 (27 Dec 2022 05:14), Max: 37 (27 Dec 2022 00:10)  T(F): 98.2 (27 Dec 2022 05:14), Max: 98.6 (27 Dec 2022 00:10)  HR: 89 (27 Dec 2022 05:14) (77 - 95)  BP: 106/71 (27 Dec 2022 05:14) (106/71 - 121/72)  BP(mean): --  RR: 18 (27 Dec 2022 05:14) (17 - 18)  SpO2: 95% (27 Dec 2022 05:14) (93% - 96%)    Parameters below as of 27 Dec 2022 05:14  Patient On (Oxygen Delivery Method): room air      CAPILLARY BLOOD GLUCOSE      POCT Blood Glucose.: 136 mg/dL (27 Dec 2022 08:01)  POCT Blood Glucose.: 179 mg/dL (26 Dec 2022 21:24)  POCT Blood Glucose.: 114 mg/dL (26 Dec 2022 16:30)  POCT Blood Glucose.: 135 mg/dL (26 Dec 2022 11:00)      12-26 @ 07:01  -  12-27 @ 07:00  --------------------------------------------------------  IN: 0 mL / OUT: 0 mL / NET: 0 mL        GENERAL: stable, no new complaints, waiting for rehab discharge.   NECK: supple, No JVD  CHEST/LUNG: clear to auscultation bilaterally; no rales, rhonchi, or wheezing b/l  HEART: normal S1, S2  ABDOMEN: BS+, soft, ND, NT   EXTREMITIES:  pulses palpable; no clubbing, cyanosis, or edema b/l LEs      LABS:

## 2022-12-28 ENCOUNTER — TRANSCRIPTION ENCOUNTER (OUTPATIENT)
Age: 82
End: 2022-12-28

## 2022-12-28 VITALS
OXYGEN SATURATION: 95 % | SYSTOLIC BLOOD PRESSURE: 113 MMHG | HEART RATE: 83 BPM | TEMPERATURE: 98 F | DIASTOLIC BLOOD PRESSURE: 76 MMHG | RESPIRATION RATE: 18 BRPM

## 2022-12-28 LAB
GLUCOSE BLDC GLUCOMTR-MCNC: 112 MG/DL — HIGH (ref 70–99)
GLUCOSE BLDC GLUCOMTR-MCNC: 113 MG/DL — HIGH (ref 70–99)
GLUCOSE BLDC GLUCOMTR-MCNC: 120 MG/DL — HIGH (ref 70–99)

## 2022-12-28 PROCEDURE — 99239 HOSP IP/OBS DSCHRG MGMT >30: CPT

## 2022-12-28 RX ADMIN — Medication 10 MILLIGRAM(S): at 15:06

## 2022-12-28 RX ADMIN — Medication 150 MILLIGRAM(S): at 11:28

## 2022-12-28 RX ADMIN — Medication 50 MICROGRAM(S): at 05:13

## 2022-12-28 RX ADMIN — Medication 10 MILLIGRAM(S): at 05:13

## 2022-12-28 RX ADMIN — ENOXAPARIN SODIUM 40 MILLIGRAM(S): 100 INJECTION SUBCUTANEOUS at 11:28

## 2022-12-28 NOTE — DISCHARGE NOTE NURSING/CASE MANAGEMENT/SOCIAL WORK - NSDCPEFALRISK_GEN_ALL_CORE
For information on Fall & Injury Prevention, visit: [email or web address] OR  [email or web address] OR  https://www.cdc.gov/steadi/patient.html
For information on Fall & Injury Prevention, visit: [email or web address] OR  [email or web address] OR  https://www.cdc.gov/steadi/patient.html

## 2022-12-28 NOTE — DISCHARGE NOTE NURSING/CASE MANAGEMENT/SOCIAL WORK - PATIENT PORTAL LINK FT
You can access the FollowMyHealth Patient Portal offered by [facility] by registering at the following website: [email or web address]. By joining [employer]’s FollowMyHealth portal, you will also be able to view your health information using other applications (apps) compatible with our system.
You can access the FollowMyHealth Patient Portal offered by [facility] by registering at the following website: [email or web address]. By joining [employer]’s FollowMyHealth portal, you will also be able to view your health information using other applications (apps) compatible with our system.

## 2022-12-28 NOTE — PROGRESS NOTE ADULT - PROVIDER SPECIALTY LIST ADULT
Hospitalist
Internal Medicine
Hospitalist

## 2022-12-28 NOTE — PROGRESS NOTE ADULT - ASSESSMENT
83 yo female w/ pmhx of depression, unsteady gait, hypothyroidism, diabetes type 2, osteoarthritis admitted to [facility] for mechanical fall, likely secondary to debility from covid 19      # recurrent mechanical falls: CT brain- no acute infarct, no fx, no tumor  - CT pelvis- no fx, PT evaluation- recommends [facility]    # COVID 19 without pneumonia  - CXR-  Small calcified granuloma or bone island projects over the left lower lung. The lungs are otherwise clear.  - no evidence of active covid 19 pna or cytokine storm syndrome.     Stop IVF.     #DM II on metformin at home  - MnJ7V-4.6, Lantus 4 units /day.     # Hypothyroidism, synthroid 50 mcg po daily    # Depression/Anxiety, effexor, buspar     DISPO: Stable for discharge to [facility] on Tuesday.  Family at bedside, agree with rehab.     81 yo female w/ pmhx of depression, unsteady gait, hypothyroidism, diabetes type 2, osteoarthritis admitted to [facility] for mechanical fall, likely secondary to debility from covid 19      # recurrent mechanical falls: CT brain- no acute infarct, no fx, no tumor  - CT pelvis- no fx, PT evaluation- recommends [facility] [place]    # COVID 19 without pneumonia. Stable, no treatment needed.      Stop IVF.     #DM II on metformin at home, DiM1V-2.6, Lantus 4 units /day, Glucose 112. .     # Hypothyroidism, synthroid 50 mcg po daily    # Depression/Anxiety, effexor, buspar     DISPO: Medically stable for discharge to [facility] [place] on Tuesday.  Family at bedside, agree with rehab. Discussed with case management yesterday.

## 2022-12-28 NOTE — PROGRESS NOTE ADULT - SUBJECTIVE AND OBJECTIVE BOX
INTERVAL HPI/OVERNIGHT EVENTS:  Pt seen and examined at bedside.     Allergies/Intolerance: No Known Allergies      MEDICATIONS  (STANDING):  busPIRone 10 milliGRAM(s) Oral three times a day  dextrose 5%. 1000 milliLiter(s) (50 mL/Hr) IV Continuous <Continuous>  dextrose 5%. 1000 milliLiter(s) (100 mL/Hr) IV Continuous <Continuous>  dextrose 5%. 1000 milliLiter(s) (50 mL/Hr) IV Continuous <Continuous>  dextrose 50% Injectable 25 Gram(s) IV Push once  dextrose 50% Injectable 12.5 Gram(s) IV Push once  dextrose 50% Injectable 25 Gram(s) IV Push once  enoxaparin Injectable 40 milliGRAM(s) SubCutaneous every 24 hours  glucagon  Injectable 1 milliGRAM(s) IntraMuscular once  insulin glargine Injectable (LANTUS) 4 Unit(s) SubCutaneous at bedtime  insulin lispro (ADMELOG) corrective regimen sliding scale   SubCutaneous three times a day before meals  levothyroxine 50 MICROGram(s) Oral daily  senna 2 Tablet(s) Oral at bedtime  venlafaxine XR. 150 milliGRAM(s) Oral daily    MEDICATIONS  (PRN):  acetaminophen     Tablet .. 650 milliGRAM(s) Oral every 6 hours PRN Mild Pain (1 - 3)  dextrose Oral Gel 15 Gram(s) Oral once PRN Blood Glucose LESS THAN 70 milliGRAM(s)/deciliter  dextrose Oral Gel 15 Gram(s) Oral once PRN Blood Glucose LESS THAN 70 milliGRAM(s)/deciliter        ROS: all systems reviewed and wnl      PHYSICAL EXAMINATION:  Vital Signs Last 24 Hrs  T(C): 36.6 (28 Dec 2022 05:12), Max: 36.7 (27 Dec 2022 17:58)  T(F): 97.8 (28 Dec 2022 05:12), Max: 98 (27 Dec 2022 17:58)  HR: 82 (28 Dec 2022 05:12) (82 - 87)  BP: 105/65 (28 Dec 2022 05:12) (103/66 - 114/73)  BP(mean): --  RR: 18 (28 Dec 2022 05:12) (17 - 18)  SpO2: 93% (28 Dec 2022 05:12) (92% - 95%)    Parameters below as of 27 Dec 2022 12:43  Patient On (Oxygen Delivery Method): room air      CAPILLARY BLOOD GLUCOSE      POCT Blood Glucose.: 124 mg/dL (27 Dec 2022 21:16)  POCT Blood Glucose.: 157 mg/dL (27 Dec 2022 16:38)  POCT Blood Glucose.: 122 mg/dL (27 Dec 2022 11:29)  POCT Blood Glucose.: 136 mg/dL (27 Dec 2022 08:01)      12-27 @ 07:01  -  12-28 @ 07:00  --------------------------------------------------------  IN: 240 mL / OUT: 800 mL / NET: -560 mL        GENERAL:   NECK: supple, No JVD  CHEST/LUNG: clear to auscultation bilaterally; no rales, rhonchi, or wheezing b/l  HEART: normal S1, S2  ABDOMEN: BS+, soft, ND, NT   EXTREMITIES:  pulses palpable; no clubbing, cyanosis, or edema b/l LEs  SKIN: no rashes or lesions      LABS:                     INTERVAL HPI/OVERNIGHT EVENTS:  Pt seen and examined at bedside.     Allergies/Intolerance: No Known Allergies      MEDICATIONS  (STANDING):  busPIRone 10 milliGRAM(s) Oral three times a day  dextrose 5%. 1000 milliLiter(s) (50 mL/Hr) IV Continuous <Continuous>  dextrose 5%. 1000 milliLiter(s) (100 mL/Hr) IV Continuous <Continuous>  dextrose 5%. 1000 milliLiter(s) (50 mL/Hr) IV Continuous <Continuous>  dextrose 50% Injectable 25 Gram(s) IV Push once  dextrose 50% Injectable 12.5 Gram(s) IV Push once  dextrose 50% Injectable 25 Gram(s) IV Push once  enoxaparin Injectable 40 milliGRAM(s) SubCutaneous every 24 hours  glucagon  Injectable 1 milliGRAM(s) IntraMuscular once  insulin glargine Injectable (LANTUS) 4 Unit(s) SubCutaneous at bedtime  insulin lispro (ADMELOG) corrective regimen sliding scale   SubCutaneous three times a day before meals  levothyroxine 50 MICROGram(s) Oral daily  senna 2 Tablet(s) Oral at bedtime  venlafaxine XR. 150 milliGRAM(s) Oral daily    MEDICATIONS  (PRN):  acetaminophen     Tablet .. 650 milliGRAM(s) Oral every 6 hours PRN Mild Pain (1 - 3)  dextrose Oral Gel 15 Gram(s) Oral once PRN Blood Glucose LESS THAN 70 milliGRAM(s)/deciliter  dextrose Oral Gel 15 Gram(s) Oral once PRN Blood Glucose LESS THAN 70 milliGRAM(s)/deciliter        ROS: all systems reviewed and wnl      PHYSICAL EXAMINATION:  Vital Signs Last 24 Hrs  T(C): 36.6 (28 Dec 2022 05:12), Max: 36.7 (27 Dec 2022 17:58)  T(F): 97.8 (28 Dec 2022 05:12), Max: 98 (27 Dec 2022 17:58)  HR: 82 (28 Dec 2022 05:12) (82 - 87)  BP: 105/65 (28 Dec 2022 05:12) (103/66 - 114/73)  BP(mean): --  RR: 18 (28 Dec 2022 05:12) (17 - 18)  SpO2: 93% (28 Dec 2022 05:12) (92% - 95%)    Parameters below as of 27 Dec 2022 12:43  Patient On (Oxygen Delivery Method): room air      CAPILLARY BLOOD GLUCOSE      POCT Blood Glucose.: 124 mg/dL (27 Dec 2022 21:16)  POCT Blood Glucose.: 157 mg/dL (27 Dec 2022 16:38)  POCT Blood Glucose.: 122 mg/dL (27 Dec 2022 11:29)  POCT Blood Glucose.: 136 mg/dL (27 Dec 2022 08:01)      12-27 @ 07:01  -  12-28 @ 07:00  --------------------------------------------------------  IN: 240 mL / OUT: 800 mL / NET: -560 mL        GENERAL: stable, in bed, alert, no new complaints  NECK: supple, No JVD  CHEST/LUNG: clear to auscultation bilaterally; no rales, rhonchi, or wheezing b/l  HEART: normal S1, S2  ABDOMEN: BS+, soft, ND, NT   EXTREMITIES:  pulses palpable; no clubbing, cyanosis, or edema b/l LEs      LABS:

## 2022-12-28 NOTE — PROGRESS NOTE ADULT - BILLING PROVIDER USE ONLY
Attending or STU Only
Attending or SUT Only

## 2023-01-05 DIAGNOSIS — M25.552 PAIN IN LEFT HIP: ICD-10-CM

## 2023-01-05 DIAGNOSIS — F41.9 ANXIETY DISORDER, UNSPECIFIED: ICD-10-CM

## 2023-01-05 DIAGNOSIS — R62.7 ADULT FAILURE TO THRIVE: ICD-10-CM
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
## 2023-01-05 DIAGNOSIS — U07.1 COVID-19: ICD-10-CM

## 2023-01-05 DIAGNOSIS — R26.81 UNSTEADINESS ON FEET: ICD-10-CM

## 2023-01-05 DIAGNOSIS — J84.10 PULMONARY FIBROSIS, UNSPECIFIED: ICD-10-CM

## 2023-01-05 DIAGNOSIS — F32.A DEPRESSION, UNSPECIFIED: ICD-10-CM

## 2023-01-05 DIAGNOSIS — Z98.42 CATARACT EXTRACTION STATUS, LEFT EYE: ICD-10-CM

## 2023-01-05 DIAGNOSIS — E44.0 MODERATE PROTEIN-CALORIE MALNUTRITION: ICD-10-CM

## 2023-01-05 DIAGNOSIS — N99.3 PROLAPSE OF VAGINAL VAULT AFTER HYSTERECTOMY: ICD-10-CM

## 2023-01-05 DIAGNOSIS — G89.11 ACUTE PAIN DUE TO TRAUMA: ICD-10-CM

## 2023-01-05 DIAGNOSIS — M47.9 SPONDYLOSIS, UNSPECIFIED: ICD-10-CM
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
## 2023-01-05 DIAGNOSIS — Z79.84 LONG TERM (CURRENT) USE OF ORAL HYPOGLYCEMIC DRUGS: ICD-10-CM

## 2023-01-05 DIAGNOSIS — E03.9 HYPOTHYROIDISM, UNSPECIFIED: ICD-10-CM

## 2023-01-05 DIAGNOSIS — E11.9 TYPE 2 DIABETES MELLITUS WITHOUT COMPLICATIONS: ICD-10-CM

## 2023-01-05 DIAGNOSIS — Z98.41 CATARACT EXTRACTION STATUS, RIGHT EYE: ICD-10-CM

## 2023-03-07 ENCOUNTER — APPOINTMENT (OUTPATIENT)
Dept: INTERNAL MEDICINE | Facility: CLINIC | Age: 83
End: 2023-03-07
Payer: MEDICARE

## 2023-03-07 ENCOUNTER — OUTPATIENT (OUTPATIENT)
Dept: OUTPATIENT SERVICES | Facility: HOSPITAL | Age: 83
LOS: 1 days | End: 2023-03-07

## 2023-03-07 VITALS — HEIGHT: 63 IN | WEIGHT: 141 LBS | BODY MASS INDEX: 24.98 KG/M2

## 2023-03-07 DIAGNOSIS — F33.2 MAJOR DEPRESSIVE DISORDER, RECURRENT SEVERE W/OUT PSYCHOTIC FEATURES: ICD-10-CM

## 2023-03-07 DIAGNOSIS — H11.32 CONJUNCTIVAL HEMORRHAGE, LEFT EYE: ICD-10-CM

## 2023-03-07 DIAGNOSIS — Z87.898 PERSONAL HISTORY OF OTHER SPECIFIED CONDITIONS: ICD-10-CM

## 2023-03-07 DIAGNOSIS — Z09 ENCOUNTER FOR FOLLOW-UP EXAMINATION AFTER COMPLETED TREATMENT FOR CONDITIONS OTHER THAN MALIGNANT NEOPLASM: ICD-10-CM

## 2023-03-07 DIAGNOSIS — E53.8 DEFICIENCY OF OTHER SPECIFIED B GROUP VITAMINS: ICD-10-CM

## 2023-03-07 DIAGNOSIS — Z90.711 ACQUIRED ABSENCE OF UTERUS WITH REMAINING CERVICAL STUMP: Chronic | ICD-10-CM

## 2023-03-07 DIAGNOSIS — G47.30 SLEEP APNEA, UNSPECIFIED: ICD-10-CM

## 2023-03-07 DIAGNOSIS — Z86.69 PERSONAL HISTORY OF OTHER DISEASES OF THE NERVOUS SYSTEM AND SENSE ORGANS: Chronic | ICD-10-CM

## 2023-03-07 PROCEDURE — 99446 NTRPROF PH1/NTRNET/EHR 5-10: CPT

## 2023-03-07 RX ORDER — FLASH GLUCOSE SENSOR
KIT MISCELLANEOUS
Qty: 2 | Refills: 3 | Status: DISCONTINUED | COMMUNITY
Start: 2022-06-22 | End: 2023-03-07

## 2023-03-07 RX ORDER — GABAPENTIN 100 MG/1
100 CAPSULE ORAL
Qty: 180 | Refills: 0 | Status: DISCONTINUED | COMMUNITY
Start: 2019-10-25 | End: 2023-03-07

## 2023-03-07 RX ORDER — FLASH GLUCOSE SCANNING READER
EACH MISCELLANEOUS
Qty: 1 | Refills: 0 | Status: DISCONTINUED | COMMUNITY
Start: 2022-06-22 | End: 2023-03-07

## 2023-03-07 RX ORDER — WHEELCHAIR
EACH MISCELLANEOUS
Qty: 1 | Refills: 0 | Status: ACTIVE | COMMUNITY
Start: 2023-03-07 | End: 1900-01-01

## 2023-03-07 RX ORDER — EMPAGLIFLOZIN 25 MG/1
25 TABLET, FILM COATED ORAL DAILY
Qty: 1 | Refills: 2 | Status: DISCONTINUED | COMMUNITY
Start: 2021-06-02 | End: 2023-03-07

## 2023-03-07 RX ORDER — LANCETS
EACH MISCELLANEOUS
Qty: 200 | Refills: 0 | Status: DISCONTINUED | COMMUNITY
Start: 2021-07-22 | End: 2023-03-07

## 2023-03-14 DIAGNOSIS — E03.9 HYPOTHYROIDISM, UNSPECIFIED: ICD-10-CM

## 2023-03-14 DIAGNOSIS — F41.8 OTHER SPECIFIED ANXIETY DISORDERS: ICD-10-CM

## 2023-03-14 DIAGNOSIS — I10 ESSENTIAL (PRIMARY) HYPERTENSION: ICD-10-CM

## 2023-03-14 DIAGNOSIS — E53.8 DEFICIENCY OF OTHER SPECIFIED B GROUP VITAMINS: ICD-10-CM

## 2023-03-14 DIAGNOSIS — R26.81 UNSTEADINESS ON FEET: ICD-10-CM

## 2023-03-14 DIAGNOSIS — E11.40 TYPE 2 DIABETES MELLITUS WITH DIABETIC NEUROPATHY, UNSPECIFIED: ICD-10-CM

## 2023-03-14 DIAGNOSIS — Z09 ENCOUNTER FOR FOLLOW-UP EXAMINATION AFTER COMPLETED TREATMENT FOR CONDITIONS OTHER THAN MALIGNANT NEOPLASM: ICD-10-CM

## 2023-03-14 NOTE — HISTORY OF PRESENT ILLNESS
[Home] : at home, [unfilled] , at the time of the visit. [Medical Office: ([facility])___] : at the medical office located in  [Verbal consent obtained from patient] : the patient, [unfilled] [Family Member] : family member [FreeTextEntry1] : weakness, unsteady gait, recent discharge from rehab [de-identified] : 80y F with DM2 (A1c 11.9 in June 2021), hypothyroidism, depression with anxiety, unsteady gait presents as a telephonic visit for follow-up after recent discharge from [facility].  Since her discharge, she says that she has still been too weak to leave the house or leave their apartment.  Upon review, it seems like this has been going on for at least a year (previously had noted spinal stenosis limited her ability to and navigate the stairs in their home).  She says that her weakness is about the same since she entered the rehab.  She denies any unilateral weakness or numbness, but says that she chronically has tingling and pain in her lower extremities for which she had previously been on gabapentin; however, this was stopped during the recent hospitalization.\par \par #Unsteady gait\par She says that she did have 1 fall without LOC/head strike onto her right side the day after returning home from the rehab.  She denies any chest pain/shortness of breath/dizziness related to this fall.  She has been continuing to ambulate with her walker however she says she must take very small steps.  She says that she has not been doing any of the exercises since leaving the rehab.  The physical therapy team evaluated her for initial assessment yesterday, but she has not yet started on a home PT program.\par \par #T2DM\par Since she has been hospitalized for so long, she has not been checking her own sugars at home.  She needs a refill for medications and for her test strips.\par \par #Memory Loss\par Son who joins her on the call expresses some concern for "very mild" memory loss.  He is not able to specifically clarify further at this time, but wonders if she needs anything for this at this time.\par \par #Blood on Eye\par Patient's son noticed this morning the patient also had bright red blood/hyperemia of the left eye.  He says that it is on the white part of the eye only.  He says there is no blood in the chamber or in front of the iris.  Ms. Yu says that she has no new vision changes, she says she chronically has blurry vision in both eyes.  She has not seen an eye doctor in some time.She denies any eye pain/flashes of lights/floaters/headache at this time.

## 2023-03-14 NOTE — PHYSICAL EXAM
[Speech Grossly Normal] : speech grossly normal [de-identified] : This is a telephonic visit only. No visual exam or other physical performed. [de-identified] : Speaks clearly & in full sentences without having to stop for breathing

## 2023-03-14 NOTE — REVIEW OF SYSTEMS
[Memory Loss] : memory loss [Unsteady Walking] : ataxia [Fever] : no fever [Chills] : no chills [Fatigue] : no fatigue [Night Sweats] : no night sweats [Nasal Discharge] : no nasal discharge [Sore Throat] : no sore throat [Postnasal Drip] : no postnasal drip [Chest Pain] : no chest pain [Orthopnea] : no orthopnea [Abdominal Pain] : no abdominal pain [Nausea] : no nausea [Vomiting] : no vomiting [Dysuria] : no dysuria [Hematuria] : no hematuria [Frequency] : no frequency [Headache] : no headache [Dizziness] : no dizziness [Fainting] : no fainting [Confusion] : no confusion [Suicidal] : not suicidal [FreeTextEntry3] : as per HPI [FreeTextEntry9] : no new pains [de-identified] : no new rashes

## 2023-03-14 NOTE — END OF VISIT
[] : Resident [FreeTextEntry3] : 83 y/o F with DM, hypothyroidism, depression/anxiety seen via tele visit for post-hospitalization follow up.\par Admitted late 2022 for fall, transferred to rehab. D/C'ed home 1-2 weeks ago\par Since discharge, still continues to have weakness. +mechanical fall on day after discharge.\par No chest pain/sob.\par H/o b12 def --> taking PO B12\par Has HHA 4 hours/day. Home PT saw her yesterday, awaiting insurance approval.\par On januvia/metformin, losartan, venlafaxine, buspar, synthroid, asa

## 2023-03-14 NOTE — ASSESSMENT
[FreeTextEntry1] : #HCM\par ::VACCINATIONS::\par [ ] Flu: will reassess next season\par [ ] COVID-19: required for rehabs in [place], will f/u date of doses\par [x] Tdap: 06/02/2021\par [ ] Zoster\par [x] Pneumo: PCV (2) (08/24/2015, 02/14/2018)\par [ ] Others:\par ::AGE-APPROPRIATE SCREENINGS::\par - Disease -\par [ ] HCV: ordered to be drawn at home\par - Cancer -\par --- Out of guideline-recommended window for routine cancer screenings\par \par Zacarias Vazquez, PGY2\par \par RTC in 4-6weeks with any provider for new PCP/CPE\par Case D/W Dr. Hernández\par \par

## 2023-03-28 ENCOUNTER — APPOINTMENT (OUTPATIENT)
Dept: INTERNAL MEDICINE | Facility: CLINIC | Age: 83
End: 2023-03-28
Payer: MEDICARE

## 2023-03-28 ENCOUNTER — OUTPATIENT (OUTPATIENT)
Dept: OUTPATIENT SERVICES | Facility: HOSPITAL | Age: 83
LOS: 1 days | End: 2023-03-28

## 2023-03-28 VITALS — HEIGHT: 63 IN | WEIGHT: 141 LBS | BODY MASS INDEX: 24.98 KG/M2

## 2023-03-28 DIAGNOSIS — Z86.69 PERSONAL HISTORY OF OTHER DISEASES OF THE NERVOUS SYSTEM AND SENSE ORGANS: Chronic | ICD-10-CM

## 2023-03-28 DIAGNOSIS — N81.9 FEMALE GENITAL PROLAPSE, UNSPECIFIED: ICD-10-CM

## 2023-03-28 DIAGNOSIS — Z90.711 ACQUIRED ABSENCE OF UTERUS WITH REMAINING CERVICAL STUMP: Chronic | ICD-10-CM

## 2023-03-28 PROCEDURE — 99214 OFFICE O/P EST MOD 30 MIN: CPT | Mod: GC,95

## 2023-03-28 RX ORDER — SITAGLIPTIN 100 MG/1
100 TABLET, FILM COATED ORAL
Qty: 90 | Refills: 3 | Status: ACTIVE | COMMUNITY
Start: 2023-03-28 | End: 1900-01-01

## 2023-03-28 RX ORDER — BLOOD-GLUCOSE METER
KIT MISCELLANEOUS
Qty: 1 | Refills: 2 | Status: DISCONTINUED | COMMUNITY
Start: 2019-09-04 | End: 2023-03-28

## 2023-03-28 RX ORDER — BLOOD-GLUCOSE METER
W/DEVICE KIT MISCELLANEOUS
Qty: 1 | Refills: 0 | Status: DISCONTINUED | COMMUNITY
Start: 2019-09-04 | End: 2023-03-28

## 2023-03-28 RX ORDER — LANCETS 33 GAUGE
EACH MISCELLANEOUS
Qty: 100 | Refills: 2 | Status: ACTIVE | COMMUNITY
Start: 2019-02-27 | End: 1900-01-01

## 2023-03-28 RX ORDER — EMPAGLIFLOZIN 25 MG/1
25 TABLET, FILM COATED ORAL DAILY
Qty: 90 | Refills: 3 | Status: ACTIVE | COMMUNITY
Start: 2023-03-28 | End: 1900-01-01

## 2023-03-28 RX ORDER — BLOOD-GLUCOSE METER
W/DEVICE KIT MISCELLANEOUS
Qty: 1 | Refills: 0 | Status: ACTIVE | COMMUNITY
Start: 2023-03-28 | End: 1900-01-01

## 2023-03-28 RX ORDER — CANE TIPS
EACH MISCELLANEOUS
Qty: 1 | Refills: 0 | Status: DISCONTINUED | OUTPATIENT
Start: 2017-01-27 | End: 2023-03-28

## 2023-03-28 RX ORDER — BLOOD-GLUCOSE METER
KIT MISCELLANEOUS DAILY
Qty: 1 | Refills: 2 | Status: ACTIVE | COMMUNITY
Start: 2023-03-28 | End: 1900-01-01

## 2023-03-28 NOTE — PHYSICAL EXAM
[No Acute Distress] : no acute distress [EOMI] : extraocular movements intact [No Respiratory Distress] : no respiratory distress  [Normal Affect] : the affect was normal [Normal Insight/Judgement] : insight and judgment were intact

## 2023-03-29 DIAGNOSIS — N81.9 FEMALE GENITAL PROLAPSE, UNSPECIFIED: ICD-10-CM

## 2023-03-29 DIAGNOSIS — I10 ESSENTIAL (PRIMARY) HYPERTENSION: ICD-10-CM

## 2023-03-29 DIAGNOSIS — E11.40 TYPE 2 DIABETES MELLITUS WITH DIABETIC NEUROPATHY, UNSPECIFIED: ICD-10-CM

## 2023-03-29 DIAGNOSIS — R35.1 NOCTURIA: ICD-10-CM

## 2023-03-29 NOTE — ADDENDUM
[FreeTextEntry1] : pt seen with [address].Reviewed with resident. Agree with residents evaluation and plan [email or web address]

## 2023-03-29 NOTE — REVIEW OF SYSTEMS
[Incontinence] : incontinence [Nocturia] : nocturia [Frequency] : frequency [Chest Pain] : no chest pain [Dysuria] : no dysuria

## 2023-03-29 NOTE — ASSESSMENT
[FreeTextEntry1] : #HCM\par ::VACCINATIONS::\par [ ] Flu: will reassess next season\par [ ] COVID-19: required for rehabs in [facility], will f/u date of doses\par [x] Tdap: 06/02/2021\par [ ] Zoster\par [x] Pneumo: PCV (2) (08/24/2015, 02/14/2018)\par [ ] Others:\par ::AGE-APPROPRIATE SCREENINGS::\par - Disease -\par [ ] HCV: ordered to be drawn at home\par - Cancer -\par --- Out of guideline-recommended window for routine cancer screenings

## 2023-03-29 NOTE — HISTORY OF PRESENT ILLNESS
[Home] : at home, [unfilled] , at the time of the visit. [Medical Office: ([facility])___] : at the medical office located in  [Family Member] : family member [Verbal consent obtained from patient] : the patient, [unfilled] [FreeTextEntry8] : 82F with uncontrolled T2DM (A1c 9.7% improved from 11.9%), hypothyroidism, depression with anxiety, unsteady gait who presents for acute visit for nocturia.\par \par Her nocturia has been present for ~2-3 months, since hospitalization after her fall and subsequent rehab stay at [facility]; she had incontinence during this hospitalization, but no prior Urology issues, no prior urologic issues, no prior issues with incontinence.  She notes she has trialed interventions of stopping drinking fluids after 4pm, and urinating before bed without improvement.  Surgical history of a partial hysterectomy in 1980s.  Pelvic Organ Prolapse was noted during her hospitalization by the nursing staff, with no referral from hospital to gynecology or urogynecology.\par \par Her diabetes medications of Januvia 100mg and Jardiance 25mg were discontinued during hospitalization, with continued hyperglycemia.  She has no glucometer at home and does not measure fingerstick glucoses because of this.  We discussed how her urinary frequency and nocturia may be a result of worse glucose control, and how restarting her diabetes medications may be a good first step.  With her pelvic organ prolapse, Urogynecology referral is warranted as well, which the patient is agreeable with.  Dr. Vazquez previously discussed the patient's transportation difficulty with  for assistance.\par \par 29 minutes video telehealth visit.

## 2023-04-05 NOTE — H&P PST ADULT - FALLEN IN THE PAST
Subjective:        Carl Watt is a 64 y o  female  Here for Gynecologic Exam      GYN HPI  Here for annual gyn exam  Menstrual cycle:  Patient is menopausal since age 50   She has hot flashes  Vaginal c/o: denies, just some dryness from menopause  Urinary c/o: deneis  Breast complaints:denies  She does do self breast Exams    Sexually active: not presently  Contraception: n/a S/P menopause  She reports she feels safe at home  The following portions of the patient's history were reviewed and updated as appropriate: allergies, current medications, past family history, past medical history, past social history, past surgical history, and problem list     Hereditary Cancer Screening  Cancer-related family history includes Breast cancer in her paternal aunt; Pancreatic cancer in her paternal uncle  There is no history of Colon cancer, Ovarian cancer, Uterine cancer, or Cervical cancer  Substance Abuse Screening Completed  See hx and flowsheet  Screens Positive for tobacco use  Trying to quit - was referred to [facility] but not covered on insurance     HEALTH MAINTENANCE SCREENINGS:    History of abnormal pap: No, Last Papanicolaou test:  Not on file  History of abnormal mammogram: yes- s/p lumpectomy- but benign, Last mammogram:  2023  Last Colon Cancer Screenin/15/2017       Review of Systems   Constitutional: Negative for appetite change, chills, fatigue, fever and unexpected weight change  HENT: Negative  Eyes: Negative  Respiratory: Negative for chest tightness and shortness of breath  Cardiovascular: Negative for chest pain and palpitations  Gastrointestinal: Negative for abdominal pain, constipation and vomiting  Endocrine: Negative for cold intolerance and heat intolerance  Genitourinary:        As per HPI   Musculoskeletal: Negative for back pain, joint swelling and neck pain  Skin: Negative for color change and rash     Neurological: Negative for dizziness, weakness and "numbness  Hematological: Does not bruise/bleed easily  Psychiatric/Behavioral: Negative  Objective:  /90 (BP Location: Left arm, Patient Position: Sitting, Cuff Size: Standard)   Ht 4' 11 5\" (1 511 m)   Wt 87 5 kg (193 lb)   BMI 38 33 kg/m²        Physical Exam  Constitutional:       General: She is not in acute distress  Appearance: Normal appearance  Genitourinary:      Vulva and rectum normal       No lesions in the vagina  Right Labia: No rash or lesions  Left Labia: No lesions or rash  No vaginal discharge, erythema, tenderness or bleeding  Right Adnexa: not tender and no mass present  Left Adnexa: not tender and no mass present  No cervical motion tenderness, discharge or friability  Uterus is not enlarged or tender  No urethral prolapse present  Pelvic exam was performed with patient in the lithotomy position  Breasts:     Breasts are symmetrical       Right: No inverted nipple, mass, nipple discharge, skin change or tenderness  Left: No inverted nipple, mass, nipple discharge, skin change or tenderness  HENT:      Head: Normocephalic and atraumatic  Cardiovascular:      Rate and Rhythm: Normal rate  Heart sounds: No murmur heard  Pulmonary:      Effort: Pulmonary effort is normal       Breath sounds: Normal breath sounds  Abdominal:      General: There is no distension  Palpations: Abdomen is soft  Tenderness: There is no abdominal tenderness  Musculoskeletal:         General: Normal range of motion  Cervical back: Normal range of motion  Lymphadenopathy:      Cervical: No cervical adenopathy  Neurological:      Mental Status: She is alert and oriented to person, place, and time  Skin:     General: Skin is warm and dry  Psychiatric:         Mood and Affect: Mood normal          Behavior: Behavior normal    Vitals reviewed                 Assessment/Plan:           ANNUAL GYN EXAM- " Primary  Annual GYN examination completed today  Risk prevention and anticipatory guidance provided including:  • Risk for hereditary cancers: Family history reviewed and patient does not qualify for referral for genetics consult/testing  Referral to genetics oncology offered as indicated  , Colon Cancer Screening: She Is up to date on screening; Next due 2025  • Calcium and vitamin D supplementation  • Dietary and lifestyle recommendations based on her age and weight  body mass index is 38 33 kg/m²       • Tobacco and alcohol use, intervention ordered if applicable  Cervical cancer screening  Previous pap smears and ASCCP screening guidelines have been reviewed  Pap smear is not indicated at this time  Contraception   N/A- post menopause    STI Screening  STI screening is declined  STI prevention discussed with use of condoms  Breast exam and breast cancer screening  Breast exam was done; , Reviewed recommendation for yearly screening mammogram (as per ACOG, ACS, and [address] [place] [address] Departments), however, also made her aware that there are other professional organizations that may recommend deferring mammograms until age 48 or screening every other year  CBE should still be done yearly, but may miss some cancers and alone is not as effective as breast imaging  Supplemental testing may also be indicated based on lifetime risk for breast cancer as done by [facility]"" imaging , She is up to date with screening;  Next due 1/24    Problem List Items Addressed This Visit    None  Visit Diagnoses     Encounter for annual routine gynecological examination    -  Primary    Encounter for screening mammogram for breast cancer        Relevant Orders    Mammo screening bilateral w 3d & cad    Ambulatory referral to Gastroenterology    Screening for cervical cancer        Screen for colon cancer              Orders Placed This Encounter   Procedures   • Mammo screening bilateral w 3d & cad   • Ambulatory referral to Gastroenterology no

## 2023-04-20 NOTE — DISCHARGE NOTE PROVIDER - PREFACE STATEMENT FOR MINUTES SPENT
I personally spent Xeljanz Counseling: I discussed with the patient the risks of Xeljanz therapy including increased risk of infection, liver issues, headache, diarrhea, or cold symptoms. Live vaccines should be avoided. They were instructed to call if they have any problems.

## 2023-04-26 RX ORDER — BLOOD-GLUCOSE METER
KIT MISCELLANEOUS
Qty: 1 | Refills: 0 | Status: ACTIVE | OUTPATIENT
Start: 2023-03-28

## 2023-06-23 ENCOUNTER — OUTPATIENT (OUTPATIENT)
Dept: OUTPATIENT SERVICES | Facility: HOSPITAL | Age: 83
LOS: 1 days | End: 2023-06-23

## 2023-06-23 ENCOUNTER — APPOINTMENT (OUTPATIENT)
Dept: INTERNAL MEDICINE | Facility: CLINIC | Age: 83
End: 2023-06-23
Payer: MEDICARE

## 2023-06-23 VITALS
RESPIRATION RATE: 16 BRPM | BODY MASS INDEX: 27.82 KG/M2 | SYSTOLIC BLOOD PRESSURE: 120 MMHG | HEART RATE: 93 BPM | OXYGEN SATURATION: 97 % | HEIGHT: 63 IN | DIASTOLIC BLOOD PRESSURE: 70 MMHG | WEIGHT: 157 LBS

## 2023-06-23 DIAGNOSIS — E11.40 TYPE 2 DIABETES MELLITUS WITH DIABETIC NEUROPATHY, UNSPECIFIED: ICD-10-CM

## 2023-06-23 DIAGNOSIS — F41.8 OTHER SPECIFIED ANXIETY DISORDERS: ICD-10-CM

## 2023-06-23 DIAGNOSIS — E03.9 HYPOTHYROIDISM, UNSPECIFIED: ICD-10-CM

## 2023-06-23 DIAGNOSIS — Z90.711 ACQUIRED ABSENCE OF UTERUS WITH REMAINING CERVICAL STUMP: Chronic | ICD-10-CM

## 2023-06-23 DIAGNOSIS — I10 ESSENTIAL (PRIMARY) HYPERTENSION: ICD-10-CM

## 2023-06-23 DIAGNOSIS — Z00.00 ENCOUNTER FOR GENERAL ADULT MEDICAL EXAMINATION W/OUT ABNORMAL FINDINGS: ICD-10-CM

## 2023-06-23 DIAGNOSIS — R35.1 NOCTURIA: ICD-10-CM

## 2023-06-23 DIAGNOSIS — F41.9 ANXIETY DISORDER, UNSPECIFIED: ICD-10-CM

## 2023-06-23 DIAGNOSIS — Z86.69 PERSONAL HISTORY OF OTHER DISEASES OF THE NERVOUS SYSTEM AND SENSE ORGANS: Chronic | ICD-10-CM

## 2023-06-23 DIAGNOSIS — M54.9 DORSALGIA, UNSPECIFIED: ICD-10-CM

## 2023-06-23 DIAGNOSIS — R26.81 UNSTEADINESS ON FEET: ICD-10-CM

## 2023-06-23 PROCEDURE — 99397 PER PM REEVAL EST PAT 65+ YR: CPT | Mod: GY

## 2023-06-23 RX ORDER — ZOSTER VACCINE RECOMBINANT, ADJUVANTED 50 MCG/0.5
50 KIT INTRAMUSCULAR
Qty: 1 | Refills: 0 | Status: ACTIVE | COMMUNITY
Start: 2023-06-23 | End: 1900-01-01

## 2023-06-23 NOTE — PHYSICAL EXAM
[No Focal Deficits] : no focal deficits [Normal] : affect was normal and insight and judgment were intact [de-identified] : poor dentition

## 2023-06-23 NOTE — HISTORY OF PRESENT ILLNESS
[FreeTextEntry1] : here for comprehensive visit [de-identified] : Pt reports that she has been doing well. No acute complaints. Denies recent ED visits/hospitalizations since last seen. Says she continues to have shooting pains down her left leg from her hip. Says gabapentin has been helping. Says she has not been taking her BP or FS at home, encouraged pt to do so. Endorses medication adherence. Pt still complains of nocturia and says she did not see the urogynecologist. Continues to report unsteady gait, uses walker in her home. Says last fall in December, has not continued with PT, encouraged her to do so in her home. Patient reports she does not leave her home (lives with her son) because she doesn't want to but reports stable mood and anxiety, denies SI.

## 2023-06-23 NOTE — ASSESSMENT
[FreeTextEntry1] : 82F with uncontrolled T2DM (A1c 9.7% improved from 11.9%), hypothyroidism, htn, depression with anxiety, unsteady gait, nocturia, pelvic organ prolapse who presents here for comprehensive visit.\par \par #DM\par -continue Januvia 100mg daily, Jardiance 25mg daily and metformin 1000mg BID\par -Glucometer and supplies sent to patient, instructed to check glucose at least daily and bring log to next in person appointment\par \par #neuropathic pain\par -continue gabapentin 400 mg qhs\par \par #Nocturia\par - hx hysterectomy and pelvic organ prolapse, no prior frequent UTIs or urologic abnormalities\par - Patient has been stopping drinking fluids at 4pm, using bathroom prior to bed\par - Plan for improved glucose control\par -urogyn referral provided\par \par #HTN\par - Cont losartan 25mg daily\par - encouraged use of BP cuff\par \par #Depression/anxiety\par -continue 225 mg effexor\par -continue buspar 10 mg \par \par #Hypothyroidism\par -continue levothyroxine 75 mcg\par \par #unsteady gait\par pt is using walker\par -encouraged pt to continue PT exercises at home\par \par #Health maintenance\par -shingrix sent to [facility]\par -routine bloodwork today: cbc, cmp, lipid, a1c, b12, tsh\par -optho referral for worsening vision\par \par RTC 3 mths\par \par Discussed with Dr. Diehl\par

## 2023-06-23 NOTE — REVIEW OF SYSTEMS
[Dryness] : dryness  [Diarrhea] : diarrhea [Frequency] : frequency [Back Pain] : back pain [Unsteady Walking] : ataxia [Anxiety] : anxiety [Negative] : Respiratory [Abdominal Pain] : no abdominal pain [Nausea] : no nausea [Constipation] : no constipation [Vomiting] : no vomiting [Dysuria] : no dysuria [Hematuria] : no hematuria [Muscle Pain] : no muscle pain [Dizziness] : no dizziness [Suicidal] : not suicidal [Depression] : no depression

## 2023-06-26 ENCOUNTER — NON-APPOINTMENT (OUTPATIENT)
Age: 83
End: 2023-06-26

## 2023-06-26 LAB
ALBUMIN SERPL ELPH-MCNC: 4.4 G/DL
ALP BLD-CCNC: 80 U/L
ALT SERPL-CCNC: 12 U/L
ANION GAP SERPL CALC-SCNC: 15 MMOL/L
AST SERPL-CCNC: 14 U/L
BASOPHILS # BLD AUTO: 0.06 K/UL
BASOPHILS NFR BLD AUTO: 0.7 %
BILIRUB SERPL-MCNC: 0.2 MG/DL
BUN SERPL-MCNC: 15 MG/DL
CALCIUM SERPL-MCNC: 10.4 MG/DL
CHLORIDE SERPL-SCNC: 101 MMOL/L
CO2 SERPL-SCNC: 24 MMOL/L
CREAT SERPL-MCNC: 0.84 MG/DL
EGFR: 70 ML/MIN/1.73M2
EOSINOPHIL # BLD AUTO: 0.15 K/UL
EOSINOPHIL NFR BLD AUTO: 1.7 %
ESTIMATED AVERAGE GLUCOSE: 194 MG/DL
GLUCOSE SERPL-MCNC: 237 MG/DL
HBA1C MFR BLD HPLC: 8.4 %
HCT VFR BLD CALC: 44.1 %
HGB BLD-MCNC: 13.5 G/DL
IMM GRANULOCYTES NFR BLD AUTO: 0.1 %
LYMPHOCYTES # BLD AUTO: 3.85 K/UL
LYMPHOCYTES NFR BLD AUTO: 44.6 %
MAN DIFF?: NORMAL
MCHC RBC-ENTMCNC: 26.3 PG
MCHC RBC-ENTMCNC: 30.6 GM/DL
MCV RBC AUTO: 85.8 FL
MONOCYTES # BLD AUTO: 0.47 K/UL
MONOCYTES NFR BLD AUTO: 5.4 %
NEUTROPHILS # BLD AUTO: 4.09 K/UL
NEUTROPHILS NFR BLD AUTO: 47.5 %
PLATELET # BLD AUTO: 245 K/UL
POTASSIUM SERPL-SCNC: 4.2 MMOL/L
PROT SERPL-MCNC: 7.3 G/DL
RBC # BLD: 5.14 M/UL
RBC # FLD: 14.4 %
SODIUM SERPL-SCNC: 140 MMOL/L
TSH SERPL-ACNC: 0.34 UIU/ML
VIT B12 SERPL-MCNC: >2000 PG/ML
WBC # FLD AUTO: 8.63 K/UL

## 2023-06-27 DIAGNOSIS — E03.9 HYPOTHYROIDISM, UNSPECIFIED: ICD-10-CM

## 2023-06-27 DIAGNOSIS — F41.9 ANXIETY DISORDER, UNSPECIFIED: ICD-10-CM

## 2023-06-27 DIAGNOSIS — Z00.00 ENCOUNTER FOR GENERAL ADULT MEDICAL EXAMINATION WITHOUT ABNORMAL FINDINGS: ICD-10-CM

## 2023-06-27 DIAGNOSIS — R26.81 UNSTEADINESS ON FEET: ICD-10-CM

## 2023-06-27 DIAGNOSIS — M54.9 DORSALGIA, UNSPECIFIED: ICD-10-CM

## 2023-06-27 DIAGNOSIS — F41.8 OTHER SPECIFIED ANXIETY DISORDERS: ICD-10-CM

## 2023-06-27 DIAGNOSIS — I10 ESSENTIAL (PRIMARY) HYPERTENSION: ICD-10-CM

## 2023-06-27 DIAGNOSIS — E11.40 TYPE 2 DIABETES MELLITUS WITH DIABETIC NEUROPATHY, UNSPECIFIED: ICD-10-CM

## 2023-06-27 DIAGNOSIS — R35.1 NOCTURIA: ICD-10-CM

## 2023-06-29 RX ORDER — GABAPENTIN 100 MG/1
100 CAPSULE ORAL
Qty: 90 | Refills: 3 | Status: ACTIVE | COMMUNITY
Start: 2023-03-28 | End: 1900-01-01

## 2023-06-29 RX ORDER — GABAPENTIN 300 MG/1
300 CAPSULE ORAL
Qty: 90 | Refills: 3 | Status: ACTIVE | COMMUNITY
Start: 2023-03-28 | End: 1900-01-01

## 2023-07-03 ENCOUNTER — EMERGENCY (EMERGENCY)
Facility: HOSPITAL | Age: 83
LOS: 1 days | Discharge: HOME | End: 2023-07-03
Attending: EMERGENCY MEDICINE | Admitting: EMERGENCY MEDICINE
Payer: MEDICARE

## 2023-07-03 VITALS
HEART RATE: 84 BPM | OXYGEN SATURATION: 100 % | RESPIRATION RATE: 20 BRPM | SYSTOLIC BLOOD PRESSURE: 98 MMHG | TEMPERATURE: 98 F | DIASTOLIC BLOOD PRESSURE: 58 MMHG

## 2023-07-03 DIAGNOSIS — Z86.69 PERSONAL HISTORY OF OTHER DISEASES OF THE NERVOUS SYSTEM AND SENSE ORGANS: Chronic | ICD-10-CM

## 2023-07-03 DIAGNOSIS — Z90.711 ACQUIRED ABSENCE OF UTERUS WITH REMAINING CERVICAL STUMP: Chronic | ICD-10-CM

## 2023-07-03 PROCEDURE — 72131 CT LUMBAR SPINE W/O DYE: CPT | Mod: 26,MA

## 2023-07-03 PROCEDURE — 99284 EMERGENCY DEPT VISIT MOD MDM: CPT

## 2023-07-03 PROCEDURE — 72125 CT NECK SPINE W/O DYE: CPT | Mod: 26,MA

## 2023-07-03 PROCEDURE — 73552 X-RAY EXAM OF FEMUR 2/>: CPT | Mod: 26,LT

## 2023-07-03 PROCEDURE — 73523 X-RAY EXAM HIPS BI 5/> VIEWS: CPT | Mod: 26

## 2023-07-03 PROCEDURE — 70450 CT HEAD/BRAIN W/O DYE: CPT | Mod: 26,MA

## 2023-07-03 PROCEDURE — 73564 X-RAY EXAM KNEE 4 OR MORE: CPT | Mod: 26,LT

## 2023-07-03 RX ORDER — ACETAMINOPHEN 500 MG
975 TABLET ORAL ONCE
Refills: 0 | Status: COMPLETED | OUTPATIENT
Start: 2023-07-03 | End: 2023-07-03

## 2023-07-03 RX ORDER — IBUPROFEN 200 MG
600 TABLET ORAL ONCE
Refills: 0 | Status: COMPLETED | OUTPATIENT
Start: 2023-07-03 | End: 2023-07-03

## 2023-07-03 RX ORDER — METHOCARBAMOL 500 MG/1
750 TABLET, FILM COATED ORAL ONCE
Refills: 0 | Status: COMPLETED | OUTPATIENT
Start: 2023-07-03 | End: 2023-07-03

## 2023-07-03 RX ORDER — LIDOCAINE 4 G/100G
1 CREAM TOPICAL ONCE
Refills: 0 | Status: COMPLETED | OUTPATIENT
Start: 2023-07-03 | End: 2023-07-03

## 2023-07-03 RX ADMIN — METHOCARBAMOL 750 MILLIGRAM(S): 500 TABLET, FILM COATED ORAL at 22:28

## 2023-07-03 RX ADMIN — LIDOCAINE 1 PATCH: 4 CREAM TOPICAL at 22:11

## 2023-07-03 RX ADMIN — Medication 975 MILLIGRAM(S): at 20:28

## 2023-07-03 RX ADMIN — Medication 600 MILLIGRAM(S): at 22:11

## 2023-07-03 RX ADMIN — Medication 975 MILLIGRAM(S): at 22:08

## 2023-07-03 NOTE — ED ADULT NURSE NOTE - NSFALLASSESSNEED_ED_ALL_ED
S: Denies chest pain, SOB, palpitations or dizziness. Review of systems otherwise (-)  	    MEDICATIONS  (STANDING):  amLODIPine   Tablet 10 milliGRAM(s) Oral daily  calcitriol   Capsule 1 MICROGram(s) Oral daily  cloNIDine 0.1 milliGRAM(s) Oral three times a day  gentamicin 0.1% Ointment 1 Application(s) Topical daily  hydrALAZINE 100 milliGRAM(s) Oral every 8 hours  lisinopril 40 milliGRAM(s) Oral daily  metoprolol tartrate 50 milliGRAM(s) Oral two times a day  predniSONE   Tablet 5 milliGRAM(s) Oral daily  sevelamer carbonate 1600 milliGRAM(s) Oral three times a day    MEDICATIONS  (PRN):      LABS:                            8.4    6.03  )-----------( 173      ( 17 Oct 2019 07:46 )             25.3     Hemoglobin: 8.4 g/dL (10-17 @ 07:46)  Hemoglobin: 8.2 g/dL (10-16 @ 10:02)  Hemoglobin: 9.0 g/dL (10-15 @ 08:49)  Hemoglobin: 8.1 g/dL (10-14 @ 08:49)    10-18    138  |  95<L>  |  49<H>  ----------------------------<  109<H>  3.7   |  29  |  20.14<H>    Ca    8.6      18 Oct 2019 06:46      Creatinine Trend: 20.14<--, 18.96<--, 18.71<--, 19.44<--, 21.56<--, 22.22<--             10-17-19 @ 07:01  -  10-18-19 @ 07:00  --------------------------------------------------------  IN: 200 mL / OUT: 0 mL / NET: 200 mL        PHYSICAL EXAM  Vital Signs Last 24 Hrs  T(C): 36.9 (18 Oct 2019 09:09), Max: 36.9 (18 Oct 2019 05:28)  T(F): 98.4 (18 Oct 2019 09:09), Max: 98.4 (18 Oct 2019 05:28)  HR: 89 (18 Oct 2019 09:09) (76 - 92)  BP: 131/68 (18 Oct 2019 09:09) (130/75 - 161/87)  BP(mean): --  RR: 18 (18 Oct 2019 09:09) (18 - 20)  SpO2: 99% (18 Oct 2019 09:09) (93% - 99%)      Gen: Appears well in NAD  HEENT:  (-)icterus (-)pallor  CV: N S1 S2 1/6 KYARA (+)2 Pulses B/l  Resp:  Clear to auscultation B/L, normal effort  GI: (+) BS Soft, NT, ND  Lymph:  (-)Edema, (-)obvious lymphadenopathy  Skin: Warm to touch, Normal turgor  Psych: Appropriate mood and affect      TELEMETRY: NSR 70s    ECG:  < from: 12 Lead ECG (10.13.19 @ 04:29) >  NORMAL SINUS RHYTHM  POSSIBLE LEFT ATRIAL ENLARGEMENT  LEFT VENTRICULAR HYPERTROPHY  NONSPECIFIC T WAVE ABNORMALITY  PROLONGED QT  ABNORMAL ECG    < from: Transthoracic Echocardiogram (01.08.18 @ 09:10) >  Conclusions:  1. Normal mitral valve with redundant chordae. Minimal  mitral regurgitation.  2. Normal trileaflet aortic valve. No aortic valve  regurgitation seen.  3. Moderate concentric left ventricular hypertrophy.  4. Normal left ventricular systolic function. No segmental  wallmotion abnormalities.  5. Normal right ventricular size and function.  *** No previous Echo exam.    < end of copied text >    < from: CT Angio Chest w/ IV Cont (10.14.19 @ 12:14) >  IMPRESSION:   No aortic intramural hematoma, aneurysm, or dissection.    Ascites.    < end of copied text >    < from: Transthoracic Echocardiogram (10.15.19 @ 09:28) >  Conclusions:  1. Mild concentric left ventricular hypertrophy.  2. Normal left ventricular systolic function. No segmental  wall motion abnormalities.  3. Normal diastolic function  4. Normal right ventricular size and function.  5. Estimated right ventricular systolic pressure equals 12  mm Hg, assuming right atrial pressure equals 8 mm Hg,  consistent with normal pulmonary pressures.  *** Compared with echocardiogram of 1/8/2018, no  significant changes noted.    < end of copied text >    < from: Nuclear Stress Test-Pharmacologic (10.16.19 @ 15:59) >  IMPRESSIONS:Abnormal Study  * Chest Pain: No chest pain with administration of  Regadenoson.  * Symptom: Flushing.  * HR Response: Appropriate.  * BP Response: Appropriate.  * Heart Rhythm: Sinus Rhythm - 74 BPM.  * Baseline ECG: Nonspecific ST-T wave abnormality.  * ECG Changes: There were less than 0.5 mm horizontal ST  segment depressions with T wave flattening and more  pronounced T wave inversions in leads II, III, aVF, and  V4-V6 starting at 1:00 min following regadenoson infusion  at 106 bpm and persisting 7:00 min in recovery.  * Arrhythmia: Occasional VPDs occurred during recovery.  * Review of raw data shows: Extensive splanchnic uptake  * The left ventricle was enlarged. There are medium-sized,  mild to moderate defects in the inferior and basal  inferoseptal walls that are mostly fixed suggestive of  scar with mild ischemia.  * There are medium-sized, mild to moderate defects in the  basal to mid anterior and basal to mid anteroseptal walls  that are mostly fixed suggestive of scar with mild  ischemia.  * These defects did not correct with prone imaging.  * Post-stress gated wall motion analysis was performed  (LVEF = 52 %;LVEDV = 187 ml.) revealing mild hypokinesis  of the inferior and basal inferoseptal walls and reduced  systolic thickening of the basal to mid anterior and basal  to mid anteroseptal walls.  *** No previous Nuclear/Stress exam.    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    -Elevated troponin noted with no sig delta in setting of ESRD with negative CKMB  -Management of HTN per renal and primary team - BP better controlled today so far  -CT negative for dissection  -TTE noted above with normal LV/RV function and no segmental WMA  -Abnormal NST noted above  -Renal f/u - okay with proceeding with cath  -Plan for cardiac cath today - risks/benefits discussed - patient and mother agreeable  -Patient to f/u with Dr. Navarro after discharge    Thad Pruitt PA-C  [facility]  Pager: [phone] yes

## 2023-07-03 NOTE — ED PROVIDER NOTE - NS ED ROS FT
GENERAL: No fever or chills  EYES: No change in vision  HEENT: No trouble swallowing or speaking  CARDIAC: No chest pain  PULMONARY: No cough or SOB  GI: No abdominal pain, no nausea or no vomiting, no diarrhea or constipation  : No changes in urination  SKIN: No rashes  NEURO: No headache, no numbness  MSK: + L. hip pain, + sacral pain  Otherwise as HPI or negative.

## 2023-07-03 NOTE — ED PROVIDER NOTE - PROGRESS NOTE DETAILS
Garth Spencer MD PGY-1: Called daughter-in-line Lizette Hampton and updated on results [id number] who will  patients. Questions answered. Garth Spencer MD PGY-1: Pt reassessed. Pain is improved with methocarbamol, ibuprofen, and lidocaine patch. Pt was able to ambulate down hallway 30 feet with assistance - uses walker at baseline but not present at bedside.

## 2023-07-03 NOTE — ED PROVIDER NOTE - IV ALTEPLASE DOOR HIDDEN
Body Location Override (Optional - Billing Will Still Be Based On Selected Body Map Location If Applicable): central back Size Of Lesion In Cm (Optional): 0 Detail Level: Simple show Body Location Override (Optional - Billing Will Still Be Based On Selected Body Map Location If Applicable): R upper back Body Location Override (Optional - Billing Will Still Be Based On Selected Body Map Location If Applicable): L knee Body Location Override (Optional - Billing Will Still Be Based On Selected Body Map Location If Applicable): R arm Body Location Override (Optional - Billing Will Still Be Based On Selected Body Map Location If Applicable): central upper back Body Location Override (Optional - Billing Will Still Be Based On Selected Body Map Location If Applicable): L cheek

## 2023-07-03 NOTE — ED PROVIDER NOTE - OBJECTIVE STATEMENT
82 Y F presenting with fall, hip pain. States fell backwards 2 days ago, hit head with mild head trauma, primary complaint of hip/sacral pain. Unable to ambulate, primary complaint of L. hip pain.

## 2023-07-03 NOTE — ED ADULT NURSE NOTE - OBJECTIVE STATEMENT
Patient arrives to the ED after a mechanical fall yesterday at home. Patient reports she was using her walker and the walker got away from her so she fell on her buttocks. Patient reporting tailbone discomfort. No obvious deformity visible. A&Ox3. Patient breathing even and nonlabored. No acute distress. Awaiting xray result. Safety maintained. Patient stable upon exiting the room.

## 2023-07-03 NOTE — ED PROVIDER NOTE - PATIENT PORTAL LINK FT
You can access the FollowMyHealth Patient Portal offered by [facility] by registering at the following website: [email or web address]. By joining [facility]’s FollowMyHealth portal, you will also be able to view your health information using other applications (apps) compatible with our system.

## 2023-07-03 NOTE — ED PROVIDER NOTE - PHYSICAL EXAMINATION
Physical Exam:  General: NAD, Conversive  Eyes: EOMI, Conjunctia and sclera clear  Neck: No JVD  Lungs: Clear to auscultation bilaterally, no wheeze, no rhonchi  Heart: Normal S1, S2, no murmurs  Abdomen: Soft, nontender, nondistended, no CVA tenderness  Extremities: 2+ peripheral pulses, no edema, + mild tenderness over L. hip, no tenderness to firm palpation of spine, + mild L. knee pain  Psych: AAO X3  Neurologic: Non-focal

## 2023-07-03 NOTE — ED PROVIDER NOTE - ATTENDING CONTRIBUTION TO CARE
Brief HPI:  82-year-old female past medical history of hypertension, diabetes, hypothyroidism, IBS presents for sacral pain after fall.  Patient states she was ambulating with walker 2 days ago and sustained mechanical fall onto sacral area.  Reports that she hit her head "a little".  Since that time she has been having difficulty ambulating due to pain.  States pain is rating to bilateral posterior thigh area.  Denies paresthesias, saddle anesthesia, bowel or bladder incontinence or retention, nausea, vomiting.  Patient is not on anticoagulation or antiplatelet agents.    Vitals:   Reviewed    Exam:    GEN:  Non-toxic appearing, non-distressed, speaking full sentences, non-diaphoretic, AAOx3  HEENT:  NCAT, neck supple, EOMI, PERRLA, sclera anicteric, no conjunctival pallor or injection, no stridor, normal voice, no tonsillar exudate, uvula midline  CV:  regular rhythm and rate, s1/s2 audible, no murmurs, rubs or gallops, peripheral pulses 2+ and symmetric  PULM:  non-labored respirations, lungs clear to auscultation bilaterally, no wheezes, crackles or rales  ABD:  non distended, non-tender, no rebound, no guarding, negative Prado's sign, bowel sounds normal, no cvat  MSK:  Pain over sacral area, no gross deformity, non-tender extremities and joints, range of motion grossly normal appearing, no extremity edema, extremities warm and well perfused   NEURO:  AAOx3, CN II-XII intact, motor 5/5 in upper and lower extremities bilaterally, sensation grossly intact in extremities and trunk, finger to nose testing wnl, no nystagmus, negative Romberg, no pronator drift, Unable to assess gait at this time as patient is without walker  SKIN:  warm, dry, no rash or vesicles   Spine: No midline cervical, thoracic, lumbar spinal tenderness or step-off.    A/P:  82-year-old female past medical history of hypertension, diabetes, hypothyroidism, IBS presents for sacral pain after fall.  Vital signs stable, afebrile.  Exam with focal neurologic deficits.  Possible sacral sprain versus fracture.  Will obtain head and spinal CTs, x-rays, analgesia.  Disposition pending.

## 2023-07-03 NOTE — ED ADULT NURSE NOTE - NSFALLRISKINTERV_ED_ALL_ED

## 2023-07-03 NOTE — ED PROVIDER NOTE - NSFOLLOWUPINSTRUCTIONS_ED_ALL_ED_FT
You were evaluated in the emergency department for a fall. Your results were discussed with you and are attached. A prescription muscle relaxer medication was sent to your pharmacy. Take as prescribed. DO NOT drink consume alcohol or operate heavy machinery when taking this medication.     Back Pain    Back pain is very common in adults. The cause of back pain is rarely dangerous and the pain often gets better over time. The cause of your back pain may not be known and may include strain of muscles or ligaments, degeneration of the spinal disks, or arthritis. Occasionally the pain may radiate down your leg(s). Over-the-counter medicines to reduce pain and inflammation are often the most helpful. Stretching and remaining active frequently helps the healing process.     SEEK IMMEDIATE MEDICAL CARE IF YOU HAVE ANY OF THE FOLLOWING SYMPTOMS: bowel or bladder control problems, unusual weakness or numbness in your arms or legs, nausea or vomiting, abdominal pain, fever, dizziness/lightheadedness. You were evaluated in the emergency department for a fall. Your results were discussed with you and are attached. A prescription muscle relaxer medication was sent to your pharmacy. Take as prescribed. DO NOT drink consume alcohol or operate heavy machinery when taking this medication.     You may take 975 mg Tylenol (acetaminophen) every six hours as needed for pain.  You may use lidocaine patches available over the counter called Salonpas. Follow packaging instructions.    Back Pain    Back pain is very common in adults. The cause of back pain is rarely dangerous and the pain often gets better over time. The cause of your back pain may not be known and may include strain of muscles or ligaments, degeneration of the spinal disks, or arthritis. Occasionally the pain may radiate down your leg(s). Over-the-counter medicines to reduce pain and inflammation are often the most helpful. Stretching and remaining active frequently helps the healing process.     SEEK IMMEDIATE MEDICAL CARE IF YOU HAVE ANY OF THE FOLLOWING SYMPTOMS: bowel or bladder control problems, unusual weakness or numbness in your arms or legs, nausea or vomiting, abdominal pain, fever, dizziness/lightheadedness.

## 2023-07-03 NOTE — ED PROVIDER NOTE - CLINICAL SUMMARY MEDICAL DECISION MAKING FREE TEXT BOX
82 Y F presenting after mild fall 2 dyas ago, difficulty ambulating, primary concern for L. hip fracture, L. knee fracture, will evaluate for intracranial hemorrhage.

## 2023-07-03 NOTE — ED ADULT TRIAGE NOTE - CHIEF COMPLAINT QUOTE
Pt arrives because she had a mechanical fall yesterday. Pt c.o back pain. Pt denies any head trauma. Denies any blood thinner use.

## 2023-07-04 VITALS
RESPIRATION RATE: 17 BRPM | TEMPERATURE: 98 F | HEART RATE: 78 BPM | DIASTOLIC BLOOD PRESSURE: 51 MMHG | OXYGEN SATURATION: 97 % | SYSTOLIC BLOOD PRESSURE: 117 MMHG

## 2023-07-04 RX ORDER — METHOCARBAMOL 500 MG/1
2 TABLET, FILM COATED ORAL
Qty: 56 | Refills: 0
Start: 2023-07-04 | End: 2023-07-10

## 2023-07-04 RX ADMIN — Medication 600 MILLIGRAM(S): at 00:01

## 2023-07-04 NOTE — ED ADULT NURSE REASSESSMENT NOTE - NS ED NURSE REASSESS COMMENT FT1
Report received from KIMBERLY Mitchell. Pt at baseline mental status, breathing even and unlabored in bed. Pt denies chest pain, SOB, dizziness, headache, blurry vision, chills. Bed in lowest position, pt resting comfortable in bed. Pt complaining of 8/10 sacral pain, pt medicated as per eMar.
Pt at baseline mental status, breathing even and unlabored in bed. Pt denies chest pain, SOB, dizziness, headache, blurry vision, chills. Pt states her pain decreased to a tolerable level at this time. Bed in lowest position.
Pt at baseline mental status, breathing even and unlabored in bed. Pt states the pain medication did not help her pain, MD placed orders. Pt to be medicated as per eMar.

## 2023-07-06 ENCOUNTER — NON-APPOINTMENT (OUTPATIENT)
Age: 83
End: 2023-07-06

## 2023-07-10 NOTE — H&P PST ADULT - VISION (WITH CORRECTIVE LENSES IF THE PATIENT USUALLY WEARS THEM):
Partially impaired: cannot see medication labels or newsprint, but can see obstacles in path, and the surrounding layout; can count fingers at arm's length
This document is complete and the patient is ready for discharge.

## 2023-08-02 ENCOUNTER — APPOINTMENT (OUTPATIENT)
Dept: INTERNAL MEDICINE | Facility: CLINIC | Age: 83
End: 2023-08-02

## 2023-08-02 ENCOUNTER — NON-APPOINTMENT (OUTPATIENT)
Age: 83
End: 2023-08-02

## 2023-08-09 ENCOUNTER — APPOINTMENT (OUTPATIENT)
Dept: INTERNAL MEDICINE | Facility: CLINIC | Age: 83
End: 2023-08-09

## 2023-08-14 ENCOUNTER — NON-APPOINTMENT (OUTPATIENT)
Age: 83
End: 2023-08-14

## 2023-08-17 ENCOUNTER — APPOINTMENT (OUTPATIENT)
Dept: INTERNAL MEDICINE | Facility: CLINIC | Age: 83
End: 2023-08-17

## 2023-08-17 ENCOUNTER — NON-APPOINTMENT (OUTPATIENT)
Age: 83
End: 2023-08-17

## 2023-08-23 ENCOUNTER — APPOINTMENT (OUTPATIENT)
Dept: INTERNAL MEDICINE | Facility: CLINIC | Age: 83
End: 2023-08-23

## 2023-08-30 ENCOUNTER — APPOINTMENT (OUTPATIENT)
Dept: INTERNAL MEDICINE | Facility: CLINIC | Age: 83
End: 2023-08-30

## 2023-09-05 ENCOUNTER — RX RENEWAL (OUTPATIENT)
Age: 83
End: 2023-09-05

## 2023-09-14 RX ORDER — VENLAFAXINE HYDROCHLORIDE 75 MG/1
75 CAPSULE, EXTENDED RELEASE ORAL DAILY
Qty: 90 | Refills: 3 | Status: ACTIVE | COMMUNITY
Start: 2023-03-07 | End: 1900-01-01

## 2023-10-06 ENCOUNTER — EMERGENCY (EMERGENCY)
Facility: HOSPITAL | Age: 83
LOS: 1 days | Discharge: HOME | End: 2023-10-06
Attending: EMERGENCY MEDICINE
Payer: COMMERCIAL

## 2023-10-06 VITALS
RESPIRATION RATE: 19 BRPM | HEART RATE: 72 BPM | DIASTOLIC BLOOD PRESSURE: 74 MMHG | SYSTOLIC BLOOD PRESSURE: 130 MMHG | OXYGEN SATURATION: 98 % | TEMPERATURE: 98 F

## 2023-10-06 VITALS
DIASTOLIC BLOOD PRESSURE: 72 MMHG | SYSTOLIC BLOOD PRESSURE: 107 MMHG | OXYGEN SATURATION: 98 % | TEMPERATURE: 98 F | RESPIRATION RATE: 18 BRPM | HEART RATE: 88 BPM

## 2023-10-06 DIAGNOSIS — Z90.711 ACQUIRED ABSENCE OF UTERUS WITH REMAINING CERVICAL STUMP: Chronic | ICD-10-CM

## 2023-10-06 DIAGNOSIS — Z86.69 PERSONAL HISTORY OF OTHER DISEASES OF THE NERVOUS SYSTEM AND SENSE ORGANS: Chronic | ICD-10-CM

## 2023-10-06 PROCEDURE — 73502 X-RAY EXAM HIP UNI 2-3 VIEWS: CPT | Mod: 26,LT

## 2023-10-06 PROCEDURE — 73552 X-RAY EXAM OF FEMUR 2/>: CPT | Mod: 26,LT

## 2023-10-06 PROCEDURE — 70450 CT HEAD/BRAIN W/O DYE: CPT | Mod: MD

## 2023-10-06 PROCEDURE — 73552 X-RAY EXAM OF FEMUR 2/>: CPT

## 2023-10-06 PROCEDURE — 73564 X-RAY EXAM KNEE 4 OR MORE: CPT | Mod: 26,LT

## 2023-10-06 PROCEDURE — 73564 X-RAY EXAM KNEE 4 OR MORE: CPT

## 2023-10-06 PROCEDURE — 76377 3D RENDER W/INTRP POSTPROCES: CPT

## 2023-10-06 PROCEDURE — 70486 CT MAXILLOFACIAL W/O DYE: CPT | Mod: 26,MD

## 2023-10-06 PROCEDURE — 70450 CT HEAD/BRAIN W/O DYE: CPT | Mod: 26,MD

## 2023-10-06 PROCEDURE — 72125 CT NECK SPINE W/O DYE: CPT | Mod: 26,MD

## 2023-10-06 PROCEDURE — 73502 X-RAY EXAM HIP UNI 2-3 VIEWS: CPT

## 2023-10-06 PROCEDURE — 76377 3D RENDER W/INTRP POSTPROCES: CPT | Mod: 26

## 2023-10-06 PROCEDURE — 73590 X-RAY EXAM OF LOWER LEG: CPT

## 2023-10-06 PROCEDURE — 73590 X-RAY EXAM OF LOWER LEG: CPT | Mod: 26,LT

## 2023-10-06 PROCEDURE — 70486 CT MAXILLOFACIAL W/O DYE: CPT | Mod: MD

## 2023-10-06 PROCEDURE — 99284 EMERGENCY DEPT VISIT MOD MDM: CPT | Mod: 25

## 2023-10-06 PROCEDURE — 71045 X-RAY EXAM CHEST 1 VIEW: CPT

## 2023-10-06 PROCEDURE — 71045 X-RAY EXAM CHEST 1 VIEW: CPT | Mod: 26

## 2023-10-06 PROCEDURE — 72125 CT NECK SPINE W/O DYE: CPT | Mod: MD

## 2023-10-06 NOTE — ED PROVIDER NOTE - ATTENDING CONTRIBUTION TO CARE
Patient presents status post mechanical fall leading to face/head strike, denies LOC.  Patient currently complaining of pain around her left eye as well as headache and pain around her "tailbone".  She denies any other pain, specifically denies neck pain, back pain, abdominal pain, chest pain, hip pain, extremity pain.  On exam patient exhibits significant edema and bruising over her left orbit, EOMI, PERRL, mild tenderness over the upper orbital rim without obvious deformities, no septal hematoma, no hemotympanum, nontender cervical spine, no extremity trauma, no chest abdomen or back tenderness or obvious trauma.  Patient able to range both her legs without difficulty, no bony tenderness to the pelvis.  Patient will get head, face and neck CT due to age and fall with head strike along with x-ray of pelvis and tailbone.   patient denies prodromal symptoms or infectious symptoms and there is low concern for other medical etiology outside of mechanical fall. If negative, patient should be stable for discharge

## 2023-10-06 NOTE — ED PROVIDER NOTE - NSFOLLOWUPINSTRUCTIONS_ED_ALL_ED_FT
You were seen and evaluated in the Emergency Department for your mechanical fall.     Clinical examination and history demonstrated no acute evidence of any life-threatening risks to your health as a result of your fall.     CT scan of your head and neck demonstrated no acute fractures, dislocations, or hemorrhage.     While emergent evaluation does not demonstrate any immediate life-threats, we strongly recommend you follow up with primary care doctor for a comprehensive evaluation of your health.     Should you develop nausea, vomiting, worsening headaches, inability to walk properly, numbness or tingling in the extremities, dizziness, or changes to your hearing/vision - please immediately return to the Emergency Department for evaluation of your symptoms.     Should you develop headaches that persist, you can call the following number to schedule an appointment with our Neurology partners:    [facility]  Neurology  [address] [place] [address] [place] [address]  Phone: [phone]    You may take 500-1000 mg acetaminophen (tylenol) every 6 hours, as needed for pain.  You may take 600 mg ibuprofen every 8 hours, with food, as needed for pain.  You can take tylenol and ibuprofen at the same time.     We also strongly encourage you to follow-up with your primary care provider for a comprehensive evaluation of your health. Please follow up with your doctor within 1 week. Bring copies of your results with you (provided in your discharge paperwork). Please stay well-hydrated.  --  Usted fue visto y evaluado en el Departamento de Emergencias por deras caída mecánica.   El examen clínico y el historial no demostraron evidencia aguda de ningún riesgo que ponga en peligro deras alondra para deras oleg dakotah resultado de deras caída.   La tomografía computarizada de la mik y el keyona no demostró fracturas agudas, dislocaciones ni hemorragias.       Si bee la evaluación emergente no demuestra ninguna amenaza inmediata para la alondra, le recomendamos encarecidamente que art un seguimiento con un médico de atención primaria para armani evaluación integral de deras oleg.       Si desarrolla náuseas, vómitos, christian de mik que empeoran, incapacidad para caminar correctamente, entumecimiento u hormigueo en las extremidades, mareos o cambios en deras audición/visión, regrese inmediatamente al Departamento de Emergencias para armani evaluación de belle síntomas.    Puede christen 500-1000 mg de acetaminofén (tylenol) cada 6 horas, según sea necesario para el dolor.  Puede christen 600 mg de ibuprofeno cada 8 horas, con alimentos, según sea necesario para el dolor.  Puede christen Tylenol e ibuprofeno al mismo tiempo.    Por favor, art un seguimiento con deras médico dentro de 1 semana. Lleve copias de belle resultados con usted (proporcionadas en deras documentación de karon). Por favor manténgase bee hidratado/a.

## 2023-10-06 NOTE — ED PROVIDER NOTE - PATIENT PORTAL LINK FT
You can access the FollowMyHealth Patient Portal offered by [facility] by registering at the following website: [email or web address]. By joining [facility]’s FollowMyHealth portal, you will also be able to view your health information using other applications (apps) compatible with our system.

## 2023-10-06 NOTE — ED PROVIDER NOTE - NS ED ROS FT
SEE HPI    All other ROS negative unless otherwise specified in HPI.     ~Douglas Prajapati M.D. Resident

## 2023-10-06 NOTE — ED PROVIDER NOTE - CLINICAL SUMMARY MEDICAL DECISION MAKING FREE TEXT BOX
82yoF w/Hx of HTN, hypothyroidism, DM, IBS p/w bruising around L eye after mechanical fall. Patient was in bathroom, bent over to pick something off of the floor, lost balance and fell over, struck her face in the process on the ground, was unable to get up after falling, son helped patient up and patient was able to ambulate after falling.  denies LOC.  No prodromal chest pain/headache/dizziness/lightheadedness/shortness of breath/nausea, no recent fevers/chills/infections, last fall was 2 months ago. Pt also reports L knee pain.  Daughter-in-law at bedside does report increased episodes of diarrhea over the last week, nonbloody.  review of systems otherwise negative.  Patient brought to ED due to concern for possible ICH given bruising around left eye.  Vital signs unremarkable, physical exam with left periorbital hematoma, decreased strength LLE w/o knee edema/ecchymosis and good ROM. Concern for possible maxillofacial trauma given periorbital hematoma, r/o fx/dislocation LLE. Will order CT head/neck,  x-ray left hip/pelvis, chest x-ray,  x-rays of left femur/knee/tib-fib, pain control, and reassess, dispo likely to be DC home with PCP follow-up if work-up negative. - Douglas Prajapati, PGY-3

## 2023-10-06 NOTE — ED PROVIDER NOTE - PHYSICAL EXAMINATION
Constitutional: Well developed, well nourished. NAD  TRAUMA: ABC intact. GCS 15.  Head: Normocephalic, atraumatic.  Eyes: PERRL. EOMI. +L periorbital hematoma w/L frontal tenderness  ENT: No nasal discharge. No septal hematoma. No Shirley sign. Mucous membranes dry. TMs clear b/l.  Neck: Supple. Painless ROM. No midline tenderness, stepoffs.  Cardiovascular: Normal S1, S2. Regular rate and rhythm. No murmurs, rubs, or gallops.  Pulmonary: Normal respiratory rate and effort. Lungs clear to auscultation bilaterally. No wheezing, rales, or rhonchi.  CHEST: No chest wall tenderness, crepitus.  Abdominal: Soft. Nondistended. Nontender. No rebound, guarding, rigidity.  BACK: No midline T/L/S tenderness, stepoffs. No saddle paresthesia. +Lidocaine patch sacral region  Extremities. Pelvis stable. No traumatic deformities, tenderness of extremities.  Skin: No rashes, cyanosis, lacerations; +2cm linear abrasion (scratch) below L knee nontender   Neuro: AAOx3. Strength 5/5 LUE/RLE/RUE; decreased strength 3/5 LLE. Sensation intact throughout. No focal neurological deficits.  Psych: Normal mood. Normal affect.

## 2023-10-06 NOTE — ED ADULT NURSE NOTE - OBJECTIVE STATEMENT
82YF A&OX4 Ambulates with walker PMHX of HTN, DM, Implantable Contact Lens presents to the ED via EMS from home c/o swelling/pain/bruising of left eye s/p mechanical fall. pt reports going to the bathroom then reached towards the floor to clean a spot while sitting on the toilet and accidentally left forward and +head injury. no LOC. son who lives with her came and helped her up and daughter in law received a call and after seeing the pt, daughter in law called EMS. EMS applied ice compress of the left eye. after the fall, pt had an episode of epitaxis, dizziness, and some vision changes (blurry) after the fall. last fall was 2 months ago.pt denies CP, SOB. f/c/n/b/d, dizziness

## 2023-10-06 NOTE — ED PROVIDER NOTE - PROGRESS NOTE DETAILS
Pt stable, ambulatory w/walker without issue, requesting discharge. Discussed results of workup, importance of follow-up with PCP, otc meds for pain, and return precautions with patient and family who verbalized understanding and agreement. Pt had opportunity to ask questions and address concerns, and results of workup including lab and imaging, and incidental findings, were reviewed and provided in DC paperwork. Patient is medically clear for DC at this time. -Douglas Prajapati, PGY-3

## 2023-10-06 NOTE — ED ADULT NURSE NOTE - NS TRANSFER PATIENT BELONGINGS
None Rhofade Counseling: Rhofade is a topical medication which can decrease superficial blood flow where applied. Side effects are uncommon and include stinging, redness and allergic reactions.

## 2023-10-06 NOTE — ED ADULT NURSE NOTE - NSFALLHARMRISKINTERV_ED_ALL_ED

## 2023-10-07 NOTE — H&P PST ADULT - NEGATIVE GENERAL GENITOURINARY SYMPTOMS
Emergency Department Encounter  Provider Note  Encounter Date: 10/7/2023    Patient Name: John Poe  MRN: [id number]    History of Present Illness   HPI  History of Present Illness:    Chief Complaint:   Chief Complaint   Patient presents with    Animal Bite     Pt was bit by a dog 2 weeks ago. And is complaining of increased pain. Denies discharge from the area. Pt has been taking abx for the bite.        45m pw L leg pain after a dog bit him over 2 wks ago  Went to  where he received augmentin, took all of the meds  Now, c/o persistent pain and now pain in his lower leg, which is new  Denies fevers, chills, new trauma  The site is healing  Dog was someone's pet, rabies utd  Denies joint pain    The following PMH/PSH/SocHx/FamHx has been reviewed by myself:    No past medical history on file.  No past surgical history on file.     No family history on file.    Allergies reviewed:  Review of patient's allergies indicates:  Not on File    Medications reviewed:      ROS  Review of Systems:    Constitutional:  Negative for fever.   HENT:  Negative for sore throat.    Eyes:  Negative for redness.   Respiratory:  Negative for shortness of breath.    Cardiovascular:  Negative for chest pain.   Gastrointestinal:  Negative for nausea.   Genitourinary:  Negative for dysuria.   Musculoskeletal:  Negative for back pain.   Skin:  Negative for rash.   Neurological:  Negative for weakness.   Hematological:  Does not bruise/bleed easily.   Psychiatric/Behavioral:  The patient is not nervous/anxious.      Physical Exam   Physical Exam    Initial Vitals [10/07/23 1428]   BP Pulse Resp Temp SpO2   122/81 88 14 98.6 °F (37 °C) 98 %      MAP       --           Triage vital signs reviewed.    Constitutional: Well-nourished, well-developed. Not in acute distress.  HENT: Normocephalic, atraumatic. Moist mucous membranes.  Eyes: No conjunctival injection.  Resp: Normal respiratory effort, breathing unlabored.  Cardio: Regular rate and  rhythm.  GI: Abdomen non-distended.   MSK: Extremities without any deformities noted. No lower extremity edema. Two healing puncture wounds L thigh. Slight swelling around site, no lower leg swelling  Skin: Warm and dry. No rashes or lesions noted.  Neuro: Awake and alert. Moves all extremities.    ED Course   Procedures    Medical Decision Making    History Acquisition     The history is provided by the patient.     Review of prior external/non ED notes: UC note from 9/23, received augmentin    Differential Diagnoses   Based on available information and initial assessment, the working Differential Diagnosis includes, but is not limited to:  Fracture, dislocation, compartment syndrome, nerve injury/palsy, vascular injury, DVT, rhabdomyolysis, hemarthrosis, septic joint, cellulitis, bursitis, muscle strain, ligament tear/sprain, laceration, foreign body, abrasion, soft tissue contusion.  .    EKG   EKG ordered and independently reviewed by me:       Labs   Lab tests ordered and independently reviewed by me:    Labs Reviewed   CBC W/ AUTO DIFFERENTIAL - Abnormal; Notable for the following components:       Result Value    Hemoglobin 13.5 (*)     All other components within normal limits   COMPREHENSIVE METABOLIC PANEL - Abnormal; Notable for the following components:    Alkaline Phosphatase 44 (*)     All other components within normal limits   CULTURE, BLOOD   CULTURE, BLOOD       Imaging   Imaging ordered and independently reviewed by me:   Imaging Results              X-Ray Femur Ap/Lat Left (Final result)  Result time 10/07/23 17:35:28      Final result by Suresh Rice DO (10/07/23 17:35:28)                   Impression:      No acute fracture or dislocation.      Electronically signed by: Suresh Rice  Date:    10/07/2023  Time:    17:35               Narrative:    EXAMINATION:  XR FEMUR 2 VIEW LEFT    CLINICAL HISTORY:  Other injury of unspecified body region, initial encounter    TECHNIQUE:  AP and  lateral views of the left femur were performed.    COMPARISON:  None.    FINDINGS:  There is no acute fracture or dislocation.  Alignment is normal.  Joint spaces are preserved.  Soft tissues are unremarkable.                                         Additional Consideration   John Poe  presents to the Emergency Department today with persistent/worsening leg pain after dog bite. Finished abx. No fevers or other systemic sxs. Exam is unremarkable. Low concern for nec fasc, but will obtain labs and XR. Suspect that he is healing from muscle injury/splinting. Anticipate discharge unless leukocytosis or evidence of persistent infx.     Additional testing considered but not indicated during the course of this workup: further imaging and labwork, not indicated  Co-morbidities/chronic illness/exacerbation of chronic illness considered which impacted clinical decision making: none  Procedures done in the ED or plan for the OR: No  Social determinants of care considered during development of treatment plan include: Decreased medical literacy  Discussion of management or test interpretation with external provider: No  DNR discussion: No    The patient's list of active medications and allergies as documented per RN staff has been reviewed.  Medications given in the ED and/or prescribed:   Medications   ketorolac injection 15 mg (has no administration in time range)             ED Course as of 10/07/23 1755   Sat Oct 07, 2023   1736 CBC auto differential(!)  Independently interpreted by me, unremarkable    [CS]   1736 Comprehensive metabolic panel(!)  Independently interpreted by me, unremarkable    [CS]   1754 X-Ray Femur Ap/Lat Left  Independently interpreted by me, unremarkable   Rads read reviewed [CS]   1754 No indication for antibiotics or further imaging. [CS]      ED Course User Index  [CS] Lucia Whipple MD       Explanation of disposition: Discharge    Clinical Impression:     1. Dog bite, subsequent encounter     2. Animal bite         All results from the workup were reviewed with the patient/family in detail. I discussed with the patient and/or the family/caregiver that today's evaluation in the ED does not suggest any emergent or life-threatening medical conditions that would require hospitalization or immediate intervention beyond what was provided in the ED. I believe the patient is safe for discharge.  However, a reassuring evaluation in the ED does not preclude the presence or development of a serious or life-threatening condition. As such, strict return precautions were discussed with the patient expressing understanding of instructions, and all questions answered. The patient/family communicates understanding of all this information and all remaining questions and concerns were addressed at this time.    The patient/family has been provided with verbal and printed direction regarding our final diagnosis(es) as well as instructions regarding use of OTC and/or Rx medications intended to manage the patient's aforementioned conditions including:  ED Prescriptions    None       The patient's condition does not warrant review of the  and prescription of controlled substances.      ED Disposition Condition    Discharge Stable               Lucia Whipple MD  10/07/23 3566     no hematuria/normal urinary frequency no incontinence/no urinary hesitancy/no bladder infections/no hematuria/no flank pain L/no flank pain R/no dysuria/normal urinary frequency

## 2023-10-11 ENCOUNTER — NON-APPOINTMENT (OUTPATIENT)
Age: 83
End: 2023-10-11

## 2023-10-26 ENCOUNTER — APPOINTMENT (OUTPATIENT)
Dept: INTERNAL MEDICINE | Facility: CLINIC | Age: 83
End: 2023-10-26

## 2023-11-02 ENCOUNTER — APPOINTMENT (OUTPATIENT)
Dept: INTERNAL MEDICINE | Facility: CLINIC | Age: 83
End: 2023-11-02

## 2023-11-28 NOTE — PROGRESS NOTE ADULT - ASSESSMENT
78y Female w/ hx of DMII, hypothyroidism, anxiety disorder presents to [facility] s/p mechanical and syncopal falls now c/o hip and knee pain and difficulty ambulating imaging neg for fx.
78y Female w/ hx of DMII, hypothyroidism, anxiety disorder presents to [facility] ED s/p mechanical and syncopal falls now c/o hip and knee pain and difficulty ambulating.
78y Female w/ hx of DMII, hypothyroidism, anxiety disorder presents to [facility] ED s/p mechanical and syncopal falls now c/o hip and knee pain and difficulty ambulating.
78y Female w/ hx of DMII, hypothyroidism, anxiety disorder presents to [facility] ED s/p mechanical and syncopal falls now c/o hip and knee pain and difficulty ambulating.
78y Female w/ hx of DMII, hypothyroidism, anxiety disorder presents to [facility] ED s/p mechanical and syncopal falls now c/o hip and knee pain and difficulty ambulating.
78y Female w/ hx of DMII, hypothyroidism, anxiety disorder presents to [facility] ED s/p mechanical and syncopal falls now c/o hip and knee pain and difficulty ambulating.
endoscopy

## 2023-12-04 RX ORDER — LOSARTAN POTASSIUM 25 MG/1
25 TABLET, FILM COATED ORAL DAILY
Qty: 90 | Refills: 1 | Status: ACTIVE | COMMUNITY
Start: 2023-12-04 | End: 1900-01-01

## 2024-01-19 NOTE — DISCHARGE NOTE NURSING/CASE MANAGEMENT/SOCIAL WORK - WAS YOUR LAST COVID-19 VACCINE GREATER THAN OR EQUAL TO TWO MONTHS AGO?
Protocol Failed/ No Protocol    Requested Prescriptions   Pending Prescriptions Disp Refills    TRAZODONE 100 MG Oral Tab [Pharmacy Med Name: TRAZODONE HYDROCHLORIDE 100 MG Tablet] 135 tablet 3     Sig: TAKE 1 AND 1/2 TABLETS EVERY NIGHT       There is no refill protocol information for this order          Future Appointments         Provider Department Appt Notes    In 1 week Vasiliy Sebastian MD [facility]     In 3 weeks Eugene Castro MD [facility] 2 MOS F/U    In 4 months Aristeo Childers MD [facility] EP DM EE ,YRLY          Recent Outpatient Visits              1 month ago Primary osteoarthritis of left knee    [facility] Eugene Castro MD    Office Visit    2 months ago [place]    4 [place] [facility] [place]    5 months ago [place] hypertension    [place] [facility] [place]    [place]    7 months ago Type 2 diabetes mellitus without retinopathy (HCC)    [facility] Aristeo Childers MD    Office Visit          
Yes
Yes

## 2024-04-23 RX ORDER — ATORVASTATIN CALCIUM 40 MG/1
40 TABLET, FILM COATED ORAL
Qty: 90 | Refills: 1 | Status: ACTIVE | COMMUNITY
Start: 2018-02-14 | End: 1900-01-01

## 2024-04-23 RX ORDER — METFORMIN HYDROCHLORIDE 1000 MG/1
1000 TABLET, COATED ORAL
Qty: 180 | Refills: 0 | Status: ACTIVE | COMMUNITY
Start: 2020-10-12 | End: 1900-01-01

## 2024-06-14 NOTE — H&P PST ADULT - NEGATIVE FEMALE-SPECIFIC SYMPTOMS
[facility] Dept  Pediatric General Surgery  History & Physical    Patient Name: Lane Olivares  MRN: [id number]  Admission Date: 6/13/2024  Hospital Length of Stay: 0 days  Attending Physician: Carlos Fitch III, MD  Primary Care Provider: No primary care provider on file.    Patient information was obtained from patient, parent, and ER records.     Subjective:     Chief Complaint/Reason for Admission: Acute Appendicitis     History of Present Illness: Lane Olivares is an otherwise healthy 9 yo M who presented to an OSH with his father this afternoon with acute onset abdominal pain which started this morning. He reports he felt normal yesterday and when he went to bed last night. Early this morning, he began to have mid-abdominal pain associated with anorexia. The pain then migrated to his right-abdomen where it has remained. He has never had pain like this before. He did begin to have emesis later on. He denies fevers/chills at home. He presented to the OSH due to the pain where he was found to have a leukocytosis and evidence of appendicitis on CT scan. He was transferred to [facility] for further evaluation and care.    No prior surgery or anesthesia events.   No family history of bleeding disorders.    Current Facility-Administered Medications on File Prior to Encounter   Medication    [COMPLETED] ampicillin-sulbactam 900 mg in sodium chloride 0.9% 100 mL IVPB (PEDS)    [COMPLETED] iohexoL (OMNIPAQUE 240) injection 20 mL    [COMPLETED] ketorolac injection 11.7 mg    [COMPLETED] ondansetron injection 4 mg    [COMPLETED] sodium chloride 0.9% bolus 468 mL 468 mL    [DISCONTINUED] ampicillin-sulbactam 900 mg in sodium chloride 0.9% 20 mL IV syringe (PEDS) (conc: 45 mg/mL)    [COMPLETED] metroNIDAZOLE IV syringe (conc: 5 mg/mL) 234 mg 46.8 mL     No current outpatient medications on file prior to encounter.       Review of patient's allergies indicates:   Allergen Reactions    Claritin [loratadine] Rash        History reviewed. No pertinent past medical history.  No past surgical history on file.  Family History    None       Tobacco Use    Smoking status: Not on file    Smokeless tobacco: Not on file   Substance and Sexual Activity    Alcohol use: Not on file    Drug use: Not on file    Sexual activity: Not on file     Review of Systems   Constitutional:  Positive for activity change and appetite change. Negative for chills and fever.   Gastrointestinal:  Positive for abdominal pain, nausea and vomiting. Negative for constipation and diarrhea.   Allergic/Immunologic: Negative for immunocompromised state.   Hematological:  Does not bruise/bleed easily.     Objective:     Vital Signs (Most Recent):  Temp: 99.5 °F (37.5 °C) (06/13/24 1827)  Pulse: 94 (06/13/24 1855)  Resp: 18 (06/13/24 1840)  BP: 108/74 (06/13/24 1819)  SpO2: 100 % (06/13/24 1855) Vital Signs (24h Range):  Temp:  [98.5 °F (36.9 °C)-99.5 °F (37.5 °C)] 99.5 °F (37.5 °C)  Pulse:  [78-96] 94  Resp:  [18-22] 18  SpO2:  [96 %-100 %] 100 %  BP: ()/(55-74) 108/74     Weight: 23.4 kg (51 lb 9.4 oz)  There is no height or weight on file to calculate BMI.       Physical Exam  Vitals reviewed.   Constitutional:       Appearance: He is not toxic-appearing.      Comments: Uncomfortable   HENT:      Head: Normocephalic.      Mouth/Throat:      Mouth: Mucous membranes are dry.   Eyes:      Conjunctiva/sclera: Conjunctivae normal.   Cardiovascular:      Rate and Rhythm: Normal rate and regular rhythm.      Pulses: Normal pulses.   Pulmonary:      Effort: Pulmonary effort is normal. No respiratory distress.   Abdominal:      Comments: The abdomen is flat. There is localized peritonitis along the lateral right abdomen with palpable induration. The remainder of the abdomen is nontender and without guarding    Musculoskeletal:      Cervical back: Normal range of motion and neck supple.   Skin:     General: Skin is warm and dry.   Neurological:      General: No focal  deficit present.      Mental Status: He is alert.      Cranial Nerves: No cranial nerve deficit.            Significant Labs:  I have reviewed all pertinent lab results within the past 24 hours.  CBC:   Recent Labs   Lab 06/13/24  1536   WBC 13.50   RBC 4.80   HGB 13.8   HCT 40.3      MCV 84   MCH 28.8   MCHC 34.2       Significant Diagnostics:  I have reviewed all pertinent imaging results/findings within the past 24 hours.  CT: I have reviewed all pertinent results/findings within the past 24 hours and my personal findings are:  Appendix runs along the right gutter with the tip near the liver. It is inflamed with surrounding fluid.    Assessment/Plan:     * Appendicitis  9 yo M presenting as an ED transfer with acute onset abdominal pain since this morning. His history, labs, exam, and imaging are all consistent with acute appendicitis. There is concern for perforation given the fluid on CT and his exam. I discussed this in depth with the father (using  Crescencio #[id number]) including surgical management, the additional Abx needed if there is indeed perforation, and the expected post-op course. We discussed the risks/benefits/alternatives. All questions were answered. He wishes to proceed.    -Admit to peds surg  -To the OR for lap appy  -Ceftriaxone/IV Flagyl. Will likely require continued abx postoperatively   -mIVF  -NPO  -Tylenol/Ibuprofen  -Morphine and Zofran prn        Yovanny Medel MD  Pediatric General Surgery  [facility]   no abnormal vaginal bleeding/no vaginal discharge/no spotting/no pelvic pain

## 2024-06-17 NOTE — H&P PST ADULT - PROBLEM/PLAN-2
Upon walking rounds, pt awake, alert, resting comfortably in recliner. Pt without complaint. Pt requesting results of labs/ scans. Pending disposition. Comfort and safety maintained. DISPLAY PLAN FREE TEXT

## 2024-07-29 ENCOUNTER — NON-APPOINTMENT (OUTPATIENT)
Age: 84
End: 2024-07-29

## 2024-07-31 ENCOUNTER — OUTPATIENT (OUTPATIENT)
Dept: OUTPATIENT SERVICES | Facility: HOSPITAL | Age: 84
LOS: 1 days | End: 2024-07-31

## 2024-07-31 ENCOUNTER — APPOINTMENT (OUTPATIENT)
Dept: INTERNAL MEDICINE | Facility: CLINIC | Age: 84
End: 2024-07-31
Payer: MEDICARE

## 2024-07-31 ENCOUNTER — LABORATORY RESULT (OUTPATIENT)
Age: 84
End: 2024-07-31

## 2024-07-31 VITALS
SYSTOLIC BLOOD PRESSURE: 114 MMHG | HEIGHT: 63 IN | DIASTOLIC BLOOD PRESSURE: 70 MMHG | HEART RATE: 93 BPM | OXYGEN SATURATION: 98 % | RESPIRATION RATE: 16 BRPM

## 2024-07-31 DIAGNOSIS — K52.9 NONINFECTIVE GASTROENTERITIS AND COLITIS, UNSPECIFIED: ICD-10-CM

## 2024-07-31 DIAGNOSIS — E53.8 DEFICIENCY OF OTHER SPECIFIED B GROUP VITAMINS: ICD-10-CM

## 2024-07-31 DIAGNOSIS — I10 ESSENTIAL (PRIMARY) HYPERTENSION: ICD-10-CM

## 2024-07-31 DIAGNOSIS — E55.9 VITAMIN D DEFICIENCY, UNSPECIFIED: ICD-10-CM

## 2024-07-31 DIAGNOSIS — H11.32 CONJUNCTIVAL HEMORRHAGE, LEFT EYE: ICD-10-CM

## 2024-07-31 DIAGNOSIS — M54.9 DORSALGIA, UNSPECIFIED: ICD-10-CM

## 2024-07-31 DIAGNOSIS — Z90.711 ACQUIRED ABSENCE OF UTERUS WITH REMAINING CERVICAL STUMP: Chronic | ICD-10-CM

## 2024-07-31 DIAGNOSIS — R26.81 UNSTEADINESS ON FEET: ICD-10-CM

## 2024-07-31 DIAGNOSIS — G47.33 OBSTRUCTIVE SLEEP APNEA (ADULT) (PEDIATRIC): ICD-10-CM

## 2024-07-31 DIAGNOSIS — Z86.69 PERSONAL HISTORY OF OTHER DISEASES OF THE NERVOUS SYSTEM AND SENSE ORGANS: Chronic | ICD-10-CM

## 2024-07-31 DIAGNOSIS — M85.80 OTHER SPECIFIED DISORDERS OF BONE DENSITY AND STRUCTURE, UNSPECIFIED SITE: ICD-10-CM

## 2024-07-31 DIAGNOSIS — F41.9 ANXIETY DISORDER, UNSPECIFIED: ICD-10-CM

## 2024-07-31 DIAGNOSIS — F41.8 OTHER SPECIFIED ANXIETY DISORDERS: ICD-10-CM

## 2024-07-31 DIAGNOSIS — E03.9 HYPOTHYROIDISM, UNSPECIFIED: ICD-10-CM

## 2024-07-31 DIAGNOSIS — E11.40 TYPE 2 DIABETES MELLITUS WITH DIABETIC NEUROPATHY, UNSPECIFIED: ICD-10-CM

## 2024-07-31 PROCEDURE — 99213 OFFICE O/P EST LOW 20 MIN: CPT

## 2024-07-31 RX ORDER — GABAPENTIN 400 MG/1
400 CAPSULE ORAL
Qty: 90 | Refills: 3 | Status: ACTIVE | COMMUNITY
Start: 2024-07-31 | End: 1900-01-01

## 2024-07-31 RX ORDER — FAMOTIDINE 20 MG/1
20 TABLET, FILM COATED ORAL
Qty: 30 | Refills: 2 | Status: ACTIVE | COMMUNITY
Start: 2024-07-31 | End: 1900-01-01

## 2024-07-31 RX ORDER — ASPIRIN 81 MG
6.5 TABLET, DELAYED RELEASE (ENTERIC COATED) ORAL
Qty: 1 | Refills: 0 | Status: ACTIVE | COMMUNITY
Start: 2024-07-31 | End: 1900-01-01

## 2024-07-31 RX ORDER — BUSPIRONE HYDROCHLORIDE 10 MG/1
10 TABLET ORAL 3 TIMES DAILY
Qty: 270 | Refills: 3 | Status: ACTIVE | COMMUNITY
Start: 2024-07-31 | End: 1900-01-01

## 2024-07-31 NOTE — HISTORY OF PRESENT ILLNESS
[FreeTextEntry1] : "Here for her checkup" [de-identified] : Ms. Yu is an 83 F with h/o HTN, Depression/Anxiety, Hypothyroidism, T2DM (poorly controlled) who presents for her annual visit for medication refills. She has several issues that have developed over the past year but is essentially home bound so would like to discuss as many as possible today as she can.  #Diarrhea Pt states she's been having [place] Type 7 for several months. She's been taking Loperamide 2mg PRN. Chart review suggests that this problem has been persistent for some time. She is joined by her daughter-in-law who says that she can spend up to 8 hours a day on the toilet but they don't think that she's actually stooling that whole time. She will sometimes fall asleep on the toilet but is easily arousable. She will sometimes "pick the bugs off" the toilet paper.   #T2DM Pt states that she's eating a lot of pastries, "Cake", coffee 2-3x/day. Not checking BGLs at home. She was apparently recently given a prescription for Glimepiride by someone who has been coming to the house. This is not reflected in our system.   #GERD She says that she's been having symptoms of heartburn. She'll sometimes take pepto bismol for this. It's been worsening recently.  #Difficulty Ambulating She uses a rolling walker at home but does have difficulty with independent ambulation. She is interested in home PT if possible.   #ELAINA She apparently lightly snores at home. She doesn't have frequent nocturnal awakenings and doesn't feel tired when she wakes up but does frequently nap throughout the day. She had previous sleep study suggestive of ELAINA and was given a CPAP machine but she did not tolerate it.

## 2024-07-31 NOTE — PHYSICAL EXAM
[No Acute Distress] : no acute distress [Well Nourished] : well nourished [Well Developed] : well developed [Well-Appearing] : well-appearing [Normal Sclera/Conjunctiva] : normal sclera/conjunctiva [PERRL] : pupils equal round and reactive to light [EOMI] : extraocular movements intact [Normal Outer Ear/Nose] : the outer ears and nose were normal in appearance [Normal Oropharynx] : the oropharynx was normal [No JVD] : no jugular venous distention [No Lymphadenopathy] : no lymphadenopathy [Supple] : supple [Thyroid Normal, No Nodules] : the thyroid was normal and there were no nodules present [No Respiratory Distress] : no respiratory distress  [No Accessory Muscle Use] : no accessory muscle use [Clear to Auscultation] : lungs were clear to auscultation bilaterally [Normal Rate] : normal rate  [Regular Rhythm] : with a regular rhythm [Normal S1, S2] : normal S1 and S2 [No Murmur] : no murmur heard [No Carotid Bruits] : no carotid bruits [No Abdominal Bruit] : a ~M bruit was not heard ~T in the abdomen [No Varicosities] : no varicosities [Pedal Pulses Present] : the pedal pulses are present [No Palpable Aorta] : no palpable aorta [No Extremity Clubbing/Cyanosis] : no extremity clubbing/cyanosis [Soft] : abdomen soft [Non Tender] : non-tender [Non-distended] : non-distended [No CVA Tenderness] : no CVA  tenderness [No Spinal Tenderness] : no spinal tenderness [No Joint Swelling] : no joint swelling [Grossly Normal Strength/Tone] : grossly normal strength/tone [No Rash] : no rash [Coordination Grossly Intact] : coordination grossly intact [No Focal Deficits] : no focal deficits [Normal Affect] : the affect was normal [Alert and Oriented x3] : oriented to person, place, and time [Normal Insight/Judgement] : insight and judgment were intact [de-identified] : trace non-pitting edema b/l LE [de-identified] : narrow, shufflin gait [de-identified] : memory subjectively impaired per pt & family

## 2024-07-31 NOTE — HEALTH RISK ASSESSMENT
[Fair] :  ~his/her~ mood as fair [No] : No [No falls in past year] : Patient reported no falls in the past year [Assistive Device] : Patient uses an assistive device [Medical reason not done] : Medical reason not done [Patient reported mammogram was abnormal] : Patient reported mammogram was abnormal [Patient reported bone density results were abnormal] : Patient reported bone density results were abnormal [With Family] : lives with family [Retired] : retired [Independent] : feeding [Some assistance needed] : using telephone [Full assistance needed] : managing finances [Reports changes in hearing] : Reports changes in hearing [Reports changes in vision] : Reports changes in vision [Reports changes in dental health] : Reports changes in dental health [With Patient/Caregiver] : , with patient/caregiver [FreeTextEntry1] : "diarrhea" [de-identified] : Eze Walker [[facility]] : >12s [MammogramDate] : 05/19 [MammogramComments] : R subareola rmass 2019, pt not able to get to appts  [BoneDensityDate] : 2018 [BoneDensityComments] : 03/2018 with osteopenia  [HIVComments] : Age > 65 [AdvancecareDate] : 07/2024 [FreeTextEntry4] : Introduced the concept of healthcare proxy and MOLST form today.  The patient is not joined by her primary caregiver or next of kin today; however, they are working on a healthcare proxy forms at home.

## 2024-07-31 NOTE — END OF VISIT
[] : Resident [FreeTextEntry3] : Pt with multiple issues, agree with trying to scale back meds, referral to geriatrics, needs GOC and HCP.

## 2024-07-31 NOTE — REVIEW OF SYSTEMS
[Vision Problems] : vision problems [Hearing Loss] : hearing loss [Lower Ext Edema] : lower extremity edema [Memory Loss] : memory loss [Anxiety] : anxiety [Negative] : Integumentary [Pain] : no pain [Itching] : no itching [Earache] : no earache [Sore Throat] : no sore throat [Suicidal] : not suicidal [Insomnia] : no insomnia [FreeTextEntry9] : pains at baseline

## 2024-07-31 NOTE — ASSESSMENT
[FreeTextEntry1] : #HCM ::VACCINATIONS:: [ ] Flu: last documented 10/2018, will disucss in 8 weeks getting this at pharmacy [ ] COVID-19:  [x] Tdap: 06/02/2021 [ ] Zoster [x] Pneumo: Prevnar 13 x3 in the past, probably acceptable [ ] RSV: will also recommend this at next visit [ ] Others: ::AGE-APPROPRIATE SCREENINGS:: - Disease - [x] HIV: Age > 65 [ ] Syphilis: Age > 65, no significant risk factors [ ] HCV: would not recommend aggressive treatment w/o hepatitis, won't check for now given age & comorbidities - Cancer - [x] CRC: Age > 76, routine screening NOT recommended [ ] Breast Ca: Mammogram historically > 5 years ago abnormal, given age & comorbidities, can consider discussions but would not routinely continue  Zacarias Vazquez MD EM/IM PGY4 Case D/W Dr. Cates  RTC 8 weeks for TTM with Dr. Vazquez

## 2024-08-02 DIAGNOSIS — E03.9 HYPOTHYROIDISM, UNSPECIFIED: ICD-10-CM

## 2024-08-02 DIAGNOSIS — I10 ESSENTIAL (PRIMARY) HYPERTENSION: ICD-10-CM

## 2024-08-02 DIAGNOSIS — E11.40 TYPE 2 DIABETES MELLITUS WITH DIABETIC NEUROPATHY, UNSPECIFIED: ICD-10-CM

## 2024-08-02 DIAGNOSIS — E53.8 DEFICIENCY OF OTHER SPECIFIED B GROUP VITAMINS: ICD-10-CM

## 2024-08-02 DIAGNOSIS — M54.9 DORSALGIA, UNSPECIFIED: ICD-10-CM

## 2024-08-02 DIAGNOSIS — F41.9 ANXIETY DISORDER, UNSPECIFIED: ICD-10-CM

## 2024-08-02 DIAGNOSIS — G47.33 OBSTRUCTIVE SLEEP APNEA (ADULT) (PEDIATRIC): ICD-10-CM

## 2024-08-02 DIAGNOSIS — K52.9 NONINFECTIVE GASTROENTERITIS AND COLITIS, UNSPECIFIED: ICD-10-CM

## 2024-08-02 DIAGNOSIS — F41.8 OTHER SPECIFIED ANXIETY DISORDERS: ICD-10-CM

## 2024-08-06 LAB
25(OH)D3 SERPL-MCNC: 43.2 NG/ML
ALBUMIN SERPL ELPH-MCNC: 4.5 G/DL
ALP BLD-CCNC: 82 U/L
ALT SERPL-CCNC: 7 U/L
ANION GAP SERPL CALC-SCNC: 17 MMOL/L
AST SERPL-CCNC: 16 U/L
BASOPHILS # BLD AUTO: 0.05 K/UL
BASOPHILS NFR BLD AUTO: 0.5 %
BILIRUB SERPL-MCNC: 0.2 MG/DL
BUN SERPL-MCNC: 17 MG/DL
CALCIUM SERPL-MCNC: 10.1 MG/DL
CHLORIDE SERPL-SCNC: 104 MMOL/L
CO2 SERPL-SCNC: 24 MMOL/L
CREAT SERPL-MCNC: 0.81 MG/DL
EGFR: 73 ML/MIN/1.73M2
EOSINOPHIL # BLD AUTO: 0.16 K/UL
EOSINOPHIL NFR BLD AUTO: 1.7 %
ESTIMATED AVERAGE GLUCOSE: 192 MG/DL
FERRITIN SERPL-MCNC: 40 NG/ML
FOLATE SERPL-MCNC: 10.8 NG/ML
GLUCOSE SERPL-MCNC: 167 MG/DL
HBA1C MFR BLD HPLC: 8.3 %
HCT VFR BLD CALC: 45.7 %
HGB BLD-MCNC: 13.5 G/DL
IMM GRANULOCYTES NFR BLD AUTO: 0.1 %
IRON SATN MFR SERPL: 9 %
IRON SERPL-MCNC: 42 UG/DL
LYMPHOCYTES # BLD AUTO: 4.19 K/UL
LYMPHOCYTES NFR BLD AUTO: 44.2 %
MAN DIFF?: NORMAL
MCHC RBC-ENTMCNC: 26 PG
MCHC RBC-ENTMCNC: 29.5 GM/DL
MCV RBC AUTO: 87.9 FL
MONOCYTES # BLD AUTO: 0.52 K/UL
MONOCYTES NFR BLD AUTO: 5.5 %
NEUTROPHILS # BLD AUTO: 4.56 K/UL
NEUTROPHILS NFR BLD AUTO: 48 %
PLATELET # BLD AUTO: 245 K/UL
POTASSIUM SERPL-SCNC: 4.9 MMOL/L
PROT SERPL-MCNC: 7.2 G/DL
RBC # BLD: 5.2 M/UL
RBC # FLD: 15.9 %
SODIUM SERPL-SCNC: 144 MMOL/L
TIBC SERPL-MCNC: 476 UG/DL
TSH SERPL-ACNC: 10.6 UIU/ML
UIBC SERPL-MCNC: 434 UG/DL
VIT B12 SERPL-MCNC: 665 PG/ML
WBC # FLD AUTO: 9.49 K/UL

## 2024-08-23 NOTE — H&P PST ADULT - MEDICATION ADMINISTRATION INFO, PROFILE
----- Message from Delvis Corey DNP sent at 8/23/2024  3:27 PM CDT -----  Please inform patient via telephone that his testosterone was low at 306 ng/dL. Referral placed to endocrinology for further evaluation.    Infant discharged to home, with mother and father. [place] discharge instructions and follow up care reviewed with parents, both state understanding. Baby bands matched with parents, footprint sheet signed by mother. Safe place code alert band discharged. Escorted to main hospital entrance by writer.    no concerns

## 2024-09-16 ENCOUNTER — TRANSCRIPTION ENCOUNTER (OUTPATIENT)
Age: 84
End: 2024-09-16

## 2024-09-17 NOTE — DIETITIAN INITIAL EVALUATION ADULT - NSPROEDAABILITYLEARN_GEN_A_NUR
Patient called stating that she had completed her PT and needs an order for outpatient therapy from Dr. Crane.  Please notify patient once it's completed.  Phone # [phone]   none

## 2024-10-08 ENCOUNTER — NON-APPOINTMENT (OUTPATIENT)
Age: 84
End: 2024-10-08

## 2024-10-08 ENCOUNTER — INPATIENT (INPATIENT)
Facility: HOSPITAL | Age: 84
LOS: 5 days | Discharge: NURSING, REHAB OR LONG-TERM CARE | End: 2024-10-14
Attending: HOSPITALIST | Admitting: HOSPITALIST
Payer: MEDICARE

## 2024-10-08 VITALS
WEIGHT: 160.06 LBS | DIASTOLIC BLOOD PRESSURE: 75 MMHG | RESPIRATION RATE: 17 BRPM | HEART RATE: 77 BPM | TEMPERATURE: 98 F | OXYGEN SATURATION: 98 % | SYSTOLIC BLOOD PRESSURE: 126 MMHG | HEIGHT: 63 IN

## 2024-10-08 DIAGNOSIS — Z90.711 ACQUIRED ABSENCE OF UTERUS WITH REMAINING CERVICAL STUMP: Chronic | ICD-10-CM

## 2024-10-08 DIAGNOSIS — Z86.69 PERSONAL HISTORY OF OTHER DISEASES OF THE NERVOUS SYSTEM AND SENSE ORGANS: Chronic | ICD-10-CM

## 2024-10-08 LAB
APTT BLD: 29 SEC — SIGNIFICANT CHANGE UP (ref 24.5–35.6)
BASOPHILS # BLD AUTO: 0.03 K/UL — SIGNIFICANT CHANGE UP (ref 0–0.2)
BASOPHILS NFR BLD AUTO: 0.3 % — SIGNIFICANT CHANGE UP (ref 0–2)
BLOOD GAS VENOUS COMPREHENSIVE RESULT: SIGNIFICANT CHANGE UP
EOSINOPHIL # BLD AUTO: 0.22 K/UL — SIGNIFICANT CHANGE UP (ref 0–0.5)
EOSINOPHIL NFR BLD AUTO: 2.5 % — SIGNIFICANT CHANGE UP (ref 0–6)
HCT VFR BLD CALC: 42.3 % — SIGNIFICANT CHANGE UP (ref 34.5–45)
HGB BLD-MCNC: 13.3 G/DL — SIGNIFICANT CHANGE UP (ref 11.5–15.5)
IANC: 4.23 K/UL — SIGNIFICANT CHANGE UP (ref 1.8–7.4)
IMM GRANULOCYTES NFR BLD AUTO: 0.1 % — SIGNIFICANT CHANGE UP (ref 0–0.9)
INR BLD: 0.91 RATIO — SIGNIFICANT CHANGE UP (ref 0.85–1.16)
LYMPHOCYTES # BLD AUTO: 3.52 K/UL — HIGH (ref 1–3.3)
LYMPHOCYTES # BLD AUTO: 40.7 % — SIGNIFICANT CHANGE UP (ref 13–44)
MCHC RBC-ENTMCNC: 26.5 PG — LOW (ref 27–34)
MCHC RBC-ENTMCNC: 31.4 GM/DL — LOW (ref 32–36)
MCV RBC AUTO: 84.4 FL — SIGNIFICANT CHANGE UP (ref 80–100)
MONOCYTES # BLD AUTO: 0.63 K/UL — SIGNIFICANT CHANGE UP (ref 0–0.9)
MONOCYTES NFR BLD AUTO: 7.3 % — SIGNIFICANT CHANGE UP (ref 2–14)
NEUTROPHILS # BLD AUTO: 4.23 K/UL — SIGNIFICANT CHANGE UP (ref 1.8–7.4)
NEUTROPHILS NFR BLD AUTO: 49.1 % — SIGNIFICANT CHANGE UP (ref 43–77)
NRBC # BLD: 0 /100 WBCS — SIGNIFICANT CHANGE UP (ref 0–0)
NRBC # FLD: 0 K/UL — SIGNIFICANT CHANGE UP (ref 0–0)
PLATELET # BLD AUTO: 279 K/UL — SIGNIFICANT CHANGE UP (ref 150–400)
PROTHROM AB SERPL-ACNC: 10.8 SEC — SIGNIFICANT CHANGE UP (ref 9.9–13.4)
RBC # BLD: 5.01 M/UL — SIGNIFICANT CHANGE UP (ref 3.8–5.2)
RBC # FLD: 14.9 % — HIGH (ref 10.3–14.5)
WBC # BLD: 8.64 K/UL — SIGNIFICANT CHANGE UP (ref 3.8–10.5)
WBC # FLD AUTO: 8.64 K/UL — SIGNIFICANT CHANGE UP (ref 3.8–10.5)

## 2024-10-08 PROCEDURE — 99285 EMERGENCY DEPT VISIT HI MDM: CPT

## 2024-10-08 RX ORDER — SODIUM CHLORIDE 0.9 % (FLUSH) 0.9 %
1000 SYRINGE (ML) INJECTION ONCE
Refills: 0 | Status: COMPLETED | OUTPATIENT
Start: 2024-10-08 | End: 2024-10-08

## 2024-10-08 RX ADMIN — Medication 1000 MILLILITER(S): at 23:50

## 2024-10-08 NOTE — ED PROVIDER NOTE - ATTENDING CONTRIBUTION TO CARE
agree with above hpi  on my exam  GEN - NAD;A+O x3   HEAD - NC/AT   EYES- PERRL, EOMI  ENT: Airway patent, dry mm. No inflammation, swelling, exudate, or lesions.  NECK: Neck supple  PULMONARY - CTA b/l, symmetric breath sounds.   CARDIAC -s1s2, RRR, no M,G,R  ABDOMEN - +BS, ND, NT, soft, no guarding  BACK - no CVA tenderness, no spinal ttp  EXTREMITIES - FROM to b/l ue and le, hips/pelvis stable, +pain on ranging left knee, no deformity/swelilng, exts mech intact, remainder of exts with no ttp and good rom to joints, symmetric pulses, capillary refill < 2 seconds, b/l le edema rpesent, well perfused, no erythema, some scabs present to left thigh region   SKIN - +stage 1 sacral decub  NEUROLOGIC - alert, speech clear, 4/5 strength b/l le, 5/5 bl ue, silt, cn2-12 int, generally weak  agree with above hpi-plan for labs, ct brain, xray chest/pelvis/left knee, ua, hydrate, admit. Patient and daughter in law at bedside agreeable to plan.

## 2024-10-08 NOTE — ED ADULT NURSE NOTE - NSFALLRISKINTERV_ED_ALL_ED

## 2024-10-08 NOTE — ED PROVIDER NOTE - CLINICAL SUMMARY MEDICAL DECISION MAKING FREE TEXT BOX
83F with hx of htn, hld, hypothyroidism, dm, presenting with recurrent falls at home due to difficulty ambulating and bucking of her LLE. Concern for safety at home with recurrent falls and progressive decline, has home PT once weekly. Head trauma last week, not on blood thinning medication. No focal neuro deficits on exam. Bilateral LE edema noted. No focal tenderness or signs of injury/trauma. Plan labs to eval for metabolic derangements, cardiac enzymes, ultrasound to eval for DVT, pelvic XR, CT head, reassess. Likely admit for subacute rehab placement.

## 2024-10-08 NOTE — ED PROVIDER NOTE - OBJECTIVE STATEMENT
83F with hx of HNT, HLD, DM, hypothyroidism, presents to the ED complaining of difficulty ambulating x 3 months. Daughter in law at bedside adding to history. Reports it has been a slow decline 83F with hx of HNT, HLD, DM, hypothyroidism, presents to the ED complaining of difficulty ambulating x 3 months. Daughter in law at bedside adding to history. Reports it has been a slow decline since July. Recurrent buckling of her legs causing falls and difficulty walking even with walker. Last fall was last week, with + head injury. No LOC. Patient reporting difficulty mostly due to left leg weakness and numbness - she states she cannot feel where it is sometimes. Patient lives with her son and has home health aides from morning to evening. She has PT come to the house once weekly. She was advised to come to the ED today by the PT due to fear of falling and safety. Daughter-in-law also notes patient is found sometimes in the bathroom and is there for many hours without realizing it. Denies headache, chest pain, sob, or infectious sx such as fever, chills, cough, abdominal pain, n/v. She does endorse intermittent pain with urination.

## 2024-10-08 NOTE — ED PROVIDER NOTE - PHYSICAL EXAMINATION
GEN: NAD, awake, eyes open spontaneously  EYES: normal conjunctiva, perrl  ENT: NCAT, MMM, poor dentition, Trachea midline  CHEST/LUNGS: Non-tachypneic, CTAB, bilateral breath sounds  CARDIAC: Non-tachycardic, normal perfusion  ABDOMEN: Soft, NTND, No rebound/guarding  MSK: Bilateral LE edema. 2+ DP pulses. No focal tenderness of LE. No gross deformity of extremities  SKIN: No rashes, no petechiae, no vesicles  NEURO: CN grossly intact, normal coordination, no focal motor or sensory deficits  PSYCH: Alert, appropriate, cooperative, with capacity and insight

## 2024-10-08 NOTE — ED ADULT TRIAGE NOTE - CHIEF COMPLAINT QUOTE
Pt arrives to ED with report of multiple falls related to bilateral leg "buckling".  Pt has been walking and then her legs will give out.  This has been happening for a few weeks.  One fall 2 weeks ago resulted in her banging the back of her head.  All other falls avoided head trauma.  Pt not on blood thinners.  Hx: DM2, Hypothyroidism, HLD, HTN, GERD fs = 130

## 2024-10-08 NOTE — ED PROVIDER NOTE - CARE PLAN
1 Principal Discharge DX:	Yeast infection  Secondary Diagnosis:	Urinary tract infection  Secondary Diagnosis:	Recurrent falls

## 2024-10-08 NOTE — ED PROVIDER NOTE - PROGRESS NOTE DETAILS
Myra Serrano DO (PGY-2): Lactate 4.7. Will hydrate and trend. Myra Serrano,  (PGY-2): Lactate improved after fluids. Urine with + WBC and yeast. Ceftriaxone and Fluconazole ordered. CT head with no acute findings. Will admit to medicine.

## 2024-10-08 NOTE — ED ADULT NURSE NOTE - OBJECTIVE STATEMENT
Received pt in room 12, pt is A&Ox3 with past medical history of HTN, HLD, DM, hypothyroidism. Pt came to the ED due to having difficulty walking for 3 months, As per daughter pt has been declining since July. Pt had have been falling lately and had a recent fall last week and hit her head with no LOC. Pt breathing is equal and nonlabored. pt b/l eyes are reactive to light. pt abdomen is soft and nondistended. Pt denies any chest pain, SOB, headache, nausea, vomiting, diarrhea, fever or chills. Pt has a 20g to the right AC. pt safety maintained.

## 2024-10-08 NOTE — ED ADULT NURSE NOTE - NSHOSCREENINGQ1_ED_ALL_ED
Patient will be able to increase static and dynamic sitting/standing by 1/2 grade in order to participate in self care tasks and functional mobility/transfers within 4 weeks No

## 2024-10-09 DIAGNOSIS — E11.9 TYPE 2 DIABETES MELLITUS WITHOUT COMPLICATIONS: ICD-10-CM

## 2024-10-09 DIAGNOSIS — I10 ESSENTIAL (PRIMARY) HYPERTENSION: ICD-10-CM

## 2024-10-09 DIAGNOSIS — R26.81 UNSTEADINESS ON FEET: ICD-10-CM

## 2024-10-09 DIAGNOSIS — K21.9 GASTRO-ESOPHAGEAL REFLUX DISEASE WITHOUT ESOPHAGITIS: ICD-10-CM

## 2024-10-09 DIAGNOSIS — E78.5 HYPERLIPIDEMIA, UNSPECIFIED: ICD-10-CM

## 2024-10-09 DIAGNOSIS — Z29.9 ENCOUNTER FOR PROPHYLACTIC MEASURES, UNSPECIFIED: ICD-10-CM

## 2024-10-09 DIAGNOSIS — N39.0 URINARY TRACT INFECTION, SITE NOT SPECIFIED: ICD-10-CM

## 2024-10-09 DIAGNOSIS — B37.9 CANDIDIASIS, UNSPECIFIED: ICD-10-CM

## 2024-10-09 DIAGNOSIS — F32.9 MAJOR DEPRESSIVE DISORDER, SINGLE EPISODE, UNSPECIFIED: ICD-10-CM

## 2024-10-09 DIAGNOSIS — E03.9 HYPOTHYROIDISM, UNSPECIFIED: ICD-10-CM

## 2024-10-09 DIAGNOSIS — N17.9 ACUTE KIDNEY FAILURE, UNSPECIFIED: ICD-10-CM

## 2024-10-09 LAB
A1C WITH ESTIMATED AVERAGE GLUCOSE RESULT: 7.8 % — HIGH (ref 4–5.6)
ADD ON TEST-SPECIMEN IN LAB: SIGNIFICANT CHANGE UP
ADD ON TEST-SPECIMEN IN LAB: SIGNIFICANT CHANGE UP
ALBUMIN SERPL ELPH-MCNC: 3.3 G/DL — SIGNIFICANT CHANGE UP (ref 3.3–5)
ALBUMIN SERPL ELPH-MCNC: 4.2 G/DL — SIGNIFICANT CHANGE UP (ref 3.3–5)
ALP SERPL-CCNC: 54 U/L — SIGNIFICANT CHANGE UP (ref 40–120)
ALP SERPL-CCNC: 66 U/L — SIGNIFICANT CHANGE UP (ref 40–120)
ALT FLD-CCNC: 11 U/L — SIGNIFICANT CHANGE UP (ref 4–33)
ALT FLD-CCNC: 6 U/L — SIGNIFICANT CHANGE UP (ref 4–33)
ANION GAP SERPL CALC-SCNC: 12 MMOL/L — SIGNIFICANT CHANGE UP (ref 7–14)
ANION GAP SERPL CALC-SCNC: 17 MMOL/L — HIGH (ref 7–14)
APPEARANCE UR: ABNORMAL
AST SERPL-CCNC: 18 U/L — SIGNIFICANT CHANGE UP (ref 4–32)
AST SERPL-CCNC: 22 U/L — SIGNIFICANT CHANGE UP (ref 4–32)
BACTERIA # UR AUTO: NEGATIVE /HPF — SIGNIFICANT CHANGE UP
BILIRUB SERPL-MCNC: 0.3 MG/DL — SIGNIFICANT CHANGE UP (ref 0.2–1.2)
BILIRUB SERPL-MCNC: 0.4 MG/DL — SIGNIFICANT CHANGE UP (ref 0.2–1.2)
BILIRUB UR-MCNC: NEGATIVE — SIGNIFICANT CHANGE UP
BLOOD GAS VENOUS COMPREHENSIVE RESULT: SIGNIFICANT CHANGE UP
BUN SERPL-MCNC: 10 MG/DL — SIGNIFICANT CHANGE UP (ref 7–23)
BUN SERPL-MCNC: 13 MG/DL — SIGNIFICANT CHANGE UP (ref 7–23)
CALCIUM SERPL-MCNC: 8.5 MG/DL — SIGNIFICANT CHANGE UP (ref 8.4–10.5)
CALCIUM SERPL-MCNC: 9.3 MG/DL — SIGNIFICANT CHANGE UP (ref 8.4–10.5)
CAST: 0 /LPF — SIGNIFICANT CHANGE UP (ref 0–4)
CHLORIDE SERPL-SCNC: 105 MMOL/L — SIGNIFICANT CHANGE UP (ref 98–107)
CHLORIDE SERPL-SCNC: 98 MMOL/L — SIGNIFICANT CHANGE UP (ref 98–107)
CHLORIDE UR-SCNC: 91 MMOL/L — SIGNIFICANT CHANGE UP
CO2 SERPL-SCNC: 22 MMOL/L — SIGNIFICANT CHANGE UP (ref 22–31)
CO2 SERPL-SCNC: 22 MMOL/L — SIGNIFICANT CHANGE UP (ref 22–31)
COLOR SPEC: YELLOW — SIGNIFICANT CHANGE UP
CREAT SERPL-MCNC: 1.24 MG/DL — SIGNIFICANT CHANGE UP (ref 0.5–1.3)
CREAT SERPL-MCNC: 1.73 MG/DL — HIGH (ref 0.5–1.3)
DIFF PNL FLD: ABNORMAL
EGFR: 29 ML/MIN/1.73M2 — LOW
EGFR: 44 ML/MIN/1.73M2 — LOW
ESTIMATED AVERAGE GLUCOSE: 177 — SIGNIFICANT CHANGE UP
GLUCOSE BLDC GLUCOMTR-MCNC: 110 MG/DL — HIGH (ref 70–99)
GLUCOSE BLDC GLUCOMTR-MCNC: 124 MG/DL — HIGH (ref 70–99)
GLUCOSE BLDC GLUCOMTR-MCNC: 138 MG/DL — HIGH (ref 70–99)
GLUCOSE BLDC GLUCOMTR-MCNC: 139 MG/DL — HIGH (ref 70–99)
GLUCOSE SERPL-MCNC: 140 MG/DL — HIGH (ref 70–99)
GLUCOSE SERPL-MCNC: 220 MG/DL — HIGH (ref 70–99)
GLUCOSE UR QL: >=1000 MG/DL
KETONES UR-MCNC: NEGATIVE MG/DL — SIGNIFICANT CHANGE UP
LEUKOCYTE ESTERASE UR-ACNC: ABNORMAL
MAGNESIUM SERPL-MCNC: 1.7 MG/DL — SIGNIFICANT CHANGE UP (ref 1.6–2.6)
NITRITE UR-MCNC: NEGATIVE — SIGNIFICANT CHANGE UP
PH UR: 6 — SIGNIFICANT CHANGE UP (ref 5–8)
PHOSPHATE SERPL-MCNC: 3.6 MG/DL — SIGNIFICANT CHANGE UP (ref 2.5–4.5)
POTASSIUM SERPL-MCNC: 3.6 MMOL/L — SIGNIFICANT CHANGE UP (ref 3.5–5.3)
POTASSIUM SERPL-MCNC: 3.9 MMOL/L — SIGNIFICANT CHANGE UP (ref 3.5–5.3)
POTASSIUM SERPL-SCNC: 3.6 MMOL/L — SIGNIFICANT CHANGE UP (ref 3.5–5.3)
POTASSIUM SERPL-SCNC: 3.9 MMOL/L — SIGNIFICANT CHANGE UP (ref 3.5–5.3)
POTASSIUM UR-SCNC: 7.2 MMOL/L — SIGNIFICANT CHANGE UP
PROT SERPL-MCNC: 6.3 G/DL — SIGNIFICANT CHANGE UP (ref 6–8.3)
PROT SERPL-MCNC: 7.3 G/DL — SIGNIFICANT CHANGE UP (ref 6–8.3)
PROT UR-MCNC: SIGNIFICANT CHANGE UP MG/DL
RBC CASTS # UR COMP ASSIST: 1 /HPF — SIGNIFICANT CHANGE UP (ref 0–4)
REVIEW: SIGNIFICANT CHANGE UP
SODIUM SERPL-SCNC: 137 MMOL/L — SIGNIFICANT CHANGE UP (ref 135–145)
SODIUM SERPL-SCNC: 139 MMOL/L — SIGNIFICANT CHANGE UP (ref 135–145)
SODIUM UR-SCNC: 95 MMOL/L — SIGNIFICANT CHANGE UP
SP GR SPEC: 1.01 — SIGNIFICANT CHANGE UP (ref 1–1.03)
SQUAMOUS # UR AUTO: 1 /HPF — SIGNIFICANT CHANGE UP (ref 0–5)
UROBILINOGEN FLD QL: 0.2 MG/DL — SIGNIFICANT CHANGE UP (ref 0.2–1)
WBC UR QL: 208 /HPF — HIGH (ref 0–5)
YEAST-LIKE CELLS: PRESENT

## 2024-10-09 PROCEDURE — 76770 US EXAM ABDO BACK WALL COMP: CPT | Mod: 26

## 2024-10-09 PROCEDURE — 73562 X-RAY EXAM OF KNEE 3: CPT | Mod: 26,LT,76

## 2024-10-09 PROCEDURE — 70450 CT HEAD/BRAIN W/O DYE: CPT | Mod: 26,MC

## 2024-10-09 PROCEDURE — 71045 X-RAY EXAM CHEST 1 VIEW: CPT | Mod: 26

## 2024-10-09 PROCEDURE — 99223 1ST HOSP IP/OBS HIGH 75: CPT

## 2024-10-09 PROCEDURE — 99222 1ST HOSP IP/OBS MODERATE 55: CPT

## 2024-10-09 PROCEDURE — 72170 X-RAY EXAM OF PELVIS: CPT | Mod: 26

## 2024-10-09 PROCEDURE — 93970 EXTREMITY STUDY: CPT | Mod: 26

## 2024-10-09 PROCEDURE — 99221 1ST HOSP IP/OBS SF/LOW 40: CPT

## 2024-10-09 RX ORDER — ALCOHOL ANTISEPTIC PADS
25 PADS, MEDICATED (EA) TOPICAL ONCE
Refills: 0 | Status: DISCONTINUED | OUTPATIENT
Start: 2024-10-09 | End: 2024-10-14

## 2024-10-09 RX ORDER — MAGNESIUM SULFATE 500 MG/ML
2 VIAL (ML) INJECTION ONCE
Refills: 0 | Status: COMPLETED | OUTPATIENT
Start: 2024-10-09 | End: 2024-10-09

## 2024-10-09 RX ORDER — GABAPENTIN 800 MG/1
1 TABLET, FILM COATED ORAL
Refills: 0 | DISCHARGE

## 2024-10-09 RX ORDER — VENLAFAXINE HCL 75 MG
225 TABLET ORAL DAILY
Refills: 0 | Status: DISCONTINUED | OUTPATIENT
Start: 2024-10-09 | End: 2024-10-14

## 2024-10-09 RX ORDER — ASPIRIN 325 MG
81 TABLET ORAL DAILY
Refills: 0 | Status: DISCONTINUED | OUTPATIENT
Start: 2024-10-10 | End: 2024-10-14

## 2024-10-09 RX ORDER — GLUCAGON INJECTION, SOLUTION 0.5 MG/.1ML
1 INJECTION, SOLUTION SUBCUTANEOUS ONCE
Refills: 0 | Status: DISCONTINUED | OUTPATIENT
Start: 2024-10-09 | End: 2024-10-14

## 2024-10-09 RX ORDER — LOSARTAN POTASSIUM 100 MG/1
25 TABLET, FILM COATED ORAL DAILY
Refills: 0 | Status: DISCONTINUED | OUTPATIENT
Start: 2024-10-09 | End: 2024-10-14

## 2024-10-09 RX ORDER — 5-HYDROXYTRYPTOPHAN (5-HTP) 100 MG
3 TABLET,DISINTEGRATING ORAL AT BEDTIME
Refills: 0 | Status: DISCONTINUED | OUTPATIENT
Start: 2024-10-09 | End: 2024-10-14

## 2024-10-09 RX ORDER — ALCOHOL ANTISEPTIC PADS
15 PADS, MEDICATED (EA) TOPICAL ONCE
Refills: 0 | Status: DISCONTINUED | OUTPATIENT
Start: 2024-10-09 | End: 2024-10-14

## 2024-10-09 RX ORDER — FLUCONAZOLE 200 MG
150 TABLET ORAL ONCE
Refills: 0 | Status: COMPLETED | OUTPATIENT
Start: 2024-10-09 | End: 2024-10-09

## 2024-10-09 RX ORDER — BUSPIRONE HCL 5 MG
10 TABLET ORAL THREE TIMES A DAY
Refills: 0 | Status: DISCONTINUED | OUTPATIENT
Start: 2024-10-09 | End: 2024-10-14

## 2024-10-09 RX ORDER — FAMOTIDINE 40 MG
20 TABLET ORAL
Refills: 0 | Status: DISCONTINUED | OUTPATIENT
Start: 2024-10-09 | End: 2024-10-14

## 2024-10-09 RX ORDER — SITAGLIPTIN PHOSPHATE 100 MG
1 TABLET ORAL
Refills: 0 | DISCHARGE

## 2024-10-09 RX ORDER — LOSARTAN POTASSIUM 100 MG/1
1 TABLET, FILM COATED ORAL
Refills: 0 | DISCHARGE

## 2024-10-09 RX ORDER — ALCOHOL ANTISEPTIC PADS
12.5 PADS, MEDICATED (EA) TOPICAL ONCE
Refills: 0 | Status: DISCONTINUED | OUTPATIENT
Start: 2024-10-09 | End: 2024-10-14

## 2024-10-09 RX ORDER — INSULIN LISPRO 100/ML
VIAL (ML) SUBCUTANEOUS
Refills: 0 | Status: DISCONTINUED | OUTPATIENT
Start: 2024-10-09 | End: 2024-10-14

## 2024-10-09 RX ORDER — ACETAMINOPHEN 325 MG
650 TABLET ORAL EVERY 6 HOURS
Refills: 0 | Status: DISCONTINUED | OUTPATIENT
Start: 2024-10-09 | End: 2024-10-14

## 2024-10-09 RX ORDER — FAMOTIDINE 40 MG
1 TABLET ORAL
Refills: 0 | DISCHARGE

## 2024-10-09 RX ORDER — SODIUM CHLORIDE IRRIG SOLUTION 0.9 %
1000 SOLUTION, IRRIGATION IRRIGATION
Refills: 0 | Status: DISCONTINUED | OUTPATIENT
Start: 2024-10-09 | End: 2024-10-14

## 2024-10-09 RX ORDER — GABAPENTIN 800 MG/1
400 TABLET, FILM COATED ORAL AT BEDTIME
Refills: 0 | Status: DISCONTINUED | OUTPATIENT
Start: 2024-10-09 | End: 2024-10-14

## 2024-10-09 RX ORDER — EMPAGLIFLOZIN 25 MG/1
1 TABLET, FILM COATED ORAL
Refills: 0 | DISCHARGE

## 2024-10-09 RX ORDER — INSULIN LISPRO 100/ML
VIAL (ML) SUBCUTANEOUS AT BEDTIME
Refills: 0 | Status: DISCONTINUED | OUTPATIENT
Start: 2024-10-09 | End: 2024-10-14

## 2024-10-09 RX ORDER — ATORVASTATIN CALCIUM 10 MG/1
40 TABLET, FILM COATED ORAL AT BEDTIME
Refills: 0 | Status: DISCONTINUED | OUTPATIENT
Start: 2024-10-09 | End: 2024-10-14

## 2024-10-09 RX ORDER — SODIUM CHLORIDE IRRIG SOLUTION 0.9 %
500 SOLUTION, IRRIGATION IRRIGATION ONCE
Refills: 0 | Status: COMPLETED | OUTPATIENT
Start: 2024-10-09 | End: 2024-10-09

## 2024-10-09 RX ORDER — CEFTRIAXONE SODIUM 1 G
1000 VIAL (EA) INJECTION ONCE
Refills: 0 | Status: COMPLETED | OUTPATIENT
Start: 2024-10-09 | End: 2024-10-09

## 2024-10-09 RX ORDER — VENLAFAXINE HCL 75 MG
1 TABLET ORAL
Refills: 0 | DISCHARGE

## 2024-10-09 RX ADMIN — Medication 5000 UNIT(S): at 14:08

## 2024-10-09 RX ADMIN — Medication 5000 UNIT(S): at 21:02

## 2024-10-09 RX ADMIN — Medication 10 MILLIGRAM(S): at 21:02

## 2024-10-09 RX ADMIN — Medication 100 MILLIGRAM(S): at 05:56

## 2024-10-09 RX ADMIN — Medication 150 MILLIGRAM(S): at 05:56

## 2024-10-09 RX ADMIN — Medication 250 MILLILITER(S): at 18:54

## 2024-10-09 RX ADMIN — Medication 20 MILLIEQUIVALENT(S): at 18:54

## 2024-10-09 RX ADMIN — Medication 37.5 MICROGRAM(S): at 14:08

## 2024-10-09 RX ADMIN — Medication 20 MILLIGRAM(S): at 18:43

## 2024-10-09 RX ADMIN — Medication 25 GRAM(S): at 18:54

## 2024-10-09 RX ADMIN — Medication 10 MILLIGRAM(S): at 14:08

## 2024-10-09 RX ADMIN — ATORVASTATIN CALCIUM 40 MILLIGRAM(S): 10 TABLET, FILM COATED ORAL at 21:02

## 2024-10-09 RX ADMIN — Medication 225 MILLIGRAM(S): at 14:07

## 2024-10-09 RX ADMIN — GABAPENTIN 400 MILLIGRAM(S): 800 TABLET, FILM COATED ORAL at 21:02

## 2024-10-09 NOTE — H&P ADULT - NSHPPHYSICALEXAM_GEN_ALL_CORE
PHYSICAL EXAM    GEN: no distress, comfortable, AAOx4, alert, and talking in full sentences  HEENT: PEERLA, NC/AT, no LAD, MMM  PULM: BS heard b/l equal, No wheezing  CVS: S1S2 present, no rubs or gallops  ABD: Soft, non-distended, no guarding; non-tender; no bladder pain or tenderness  EXT: No lower extremity edema bilaterally or knee pain/swelling on palpation   NEURO: A&Ox4, CN II-XII intact, moving all extremities with 5/5 in UE and LE  Skin: dry skin, age related skin changes, excortication noted around L knee

## 2024-10-09 NOTE — H&P ADULT - ASSESSMENT
Ms. Yu is an 82 y/o F with PMH of NIDDMT2, GERD, HTN, HLD, hypothyroidism, anxiety/depression, IBS who presented from home at request of OP physical therapist after pt noted to have recurrent falls, more unsteadiness in her gait with her legs buckling. Most recently fell 2 weeks ago, backwards, +headstrike but LOC. 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 Admitted for further workup and evaluation.

## 2024-10-09 NOTE — PATIENT PROFILE ADULT - FALL HARM RISK - HARM RISK INTERVENTIONS
Assistance with ambulation/Assistance OOB with selected safe patient handling equipment/Communicate Risk of Fall with Harm to all staff/Discuss with provider need for PT consult/Monitor for mental status changes/Monitor gait and stability/Provide patient with walking aids - walker, cane, crutches/Reinforce activity limits and safety measures with patient and family/Tailored Fall Risk Interventions/Visual Cue: Yellow wristband and red socks/Bed in lowest position, wheels locked, appropriate side rails in place/Call bell, personal items and telephone in reach/Instruct patient to call for assistance before getting out of bed or chair/Non-slip footwear when patient is out of bed/[place] to call system/Physically safe environment - no spills, clutter or unnecessary equipment/Purposeful Proactive Rounding/Room/bathroom lighting operational, light cord in reach

## 2024-10-09 NOTE — CONSULT NOTE ADULT - SUBJECTIVE AND OBJECTIVE BOX
ID INITIAL CONSULT NOTE     Patient is a 83y old  Female who presents with a chief complaint of Recurrent Falls, UTI (09 Oct 2024 12:10)      HPI:  Ms. Yu is an 82 y/o F with PMH of NIDDMT2, GERD, HTN, HLD, hypothyroidism, anxiety/depression, IBS who presented from home at request of OP physical therapist after pt noted to have recurrent falls, more unsteadiness in her gait with her legs buckling. Most recently fell 2 weeks ago, backwards, +headstrike but LOC. Pt reports L knee pain, intermittent burning on urination that has been ongoing for past 2 months. Lives with son, complaint with meds, has HHA as well, uses a walker to ambulate around. Daughter also reports having cognitive decline/issues, being more "zoned out", at times falling asleep on the toilet - but noted that has been ongoing for a long period of time.     Pt seen in ED, AAOx4, comfortable, not reporting any cp, sob, fevers, chills, n/v/c, reports loose BM (2x/day, brown, non watery), some mild L knee pain.     In the ED, VSS, afebrile with BP in 120s-140s/60s-70s, Labs notable for lactate of 4.7 -> 1.9 on repeat, WBC 8.6, Hb 13.3, increased RDW, K 3.6, Mg 1.7, Cr 1.73 -> 1.23 on repeat. CTH notable for no acute pathology, prior old basal ganglia infarct. LE dopplers negative for clot. CXR unimpressive for acute pathology. Xray for pelvis and L knee showing chronic changes but no acute pathology appreciated.     Admitted to medicine for further workup and management.  (09 Oct 2024 12:10)      prior hospital charts reviewed [  ]  primary team notes reviewed [ X ]  other consultant notes reviewed [X  ]    PAST MEDICAL & SURGICAL HISTORY:  HTN (hypertension)      DM (diabetes mellitus)  type 2      Anxiety      Sleep apnea  Patient reports she does not use CPAP      Hypothyroidism      Panic disorder      Depression      IBS (irritable bowel syndrome)      Noninfective gastroenteritis and colitis      S/P partial hysterectomy  1997      History of cataract  Bilateral          Allergies  No Known Allergies        ANTIMICROBIALS:      Current Abx:     Past Abx:       OTHER MEDS: MEDICATIONS  (STANDING):  acetaminophen     Tablet .. 650 every 6 hours PRN  atorvastatin 40 at bedtime  busPIRone 10 three times a day  dextrose 50% Injectable 25 once  dextrose 50% Injectable 25 once  dextrose 50% Injectable 12.5 once  dextrose Oral Gel 15 once PRN  famotidine    Tablet 20 <User Schedule>  gabapentin 400 at bedtime  glucagon  Injectable 1 once  heparin   Injectable 5000 every 8 hours  insulin lispro (ADMELOG) corrective regimen sliding scale  at bedtime  insulin lispro (ADMELOG) corrective regimen sliding scale  three times a day before meals  losartan 25 daily  melatonin 3 at bedtime PRN  venlafaxine XR. 225 daily      SOCIAL HISTORY: [ ] etoh [ ] tobacco [ ] former smoker [ ] IVDU  Lives w/ son, ambulates w/ walker     FAMILY HISTORY:  Family history of diabetes mellitus    Family history of heart disease    Family history of diabetes mellitus (Child)        REVIEW OF SYSTEMS:    CONSTITUTIONAL: No weakness, fevers or chills  EYES/ENT: No visual changes;  No vertigo or throat pain   NECK: No pain or stiffness  RESPIRATORY: No cough, wheezing, hemoptysis; No shortness of breath  CARDIOVASCULAR: No chest pain or palpitations  GASTROINTESTINAL: No abdominal or epigastric pain. No nausea, vomiting, or hematemesis; No diarrhea or constipation. No melena or hematochezia.  GENITOURINARY: +dysuria   NEUROLOGICAL: No numbness or weakness  SKIN: No itching, burning, rashes, or lesions   All other review of systems is negative unless indicated above.    Vital Signs Last 24 Hrs  T(F): 98 (10-09-24 @ 10:17), Max: 99.3 (10-09-24 @ 08:46)    Vital Signs Last 24 Hrs  HR: 82 (10-09-24 @ 10:17) (77 - 88)  BP: 119/57 (10-09-24 @ 10:17) (119/57 - 144/67)  RR: 16 (10-09-24 @ 10:17)  SpO2: 97% (10-09-24 @ 10:17) (97% - 98%)  Wt(kg): --    PHYSICAL EXAM:  GENERAL: NAD, well-developed  HEAD:  Atraumatic, Normocephalic  EYES: EOMI, PERRLA, conjunctiva and sclera clear  NECK: Supple, No JVD  CHEST/LUNG: Clear to auscultation bilaterally; No wheeze  HEART: Regular rate and rhythm; No murmurs, rubs, or gallops  ABDOMEN: Soft, ND. +suprapubic tenderness,   EXTREMITIES:  2+ Peripheral Pulses, No clubbing, cyanosis, or edema  PSYCH: AAOx3        Labs:                          13.3   8.64  )-----------( 279      ( 08 Oct 2024 23:02 )             42.3       10-09    139  |  105  |  10  ----------------------------<  140[H]  3.9   |  22  |  1.24    Ca    8.5      09 Oct 2024 11:00  Phos  3.6     10-08  Mg     1.70     10-08    TPro  6.3  /  Alb  3.3  /  TBili  0.4  /  DBili  x   /  AST  18  /  ALT  6   /  AlkPhos  54  10-09      Urinalysis Basic - ( 09 Oct 2024 11:00 )    Color: x / Appearance: x / SG: x / pH: x  Gluc: 140 mg/dL / Ketone: x  / Bili: x / Urobili: x   Blood: x / Protein: x / Nitrite: x   Leuk Esterase: x / RBC: x / WBC x   Sq Epi: x / Non Sq Epi: x / Bacteria: x          MICROBIOLOGY:              RADIOLOGY:

## 2024-10-09 NOTE — H&P ADULT - PROBLEM SELECTOR PLAN 1
Direct hyperbilirubinemia on admission w/ AST/ALT elevation. Liver US not reporting dilated bile ducts  No new medications identified for DILI. Hepatitis panel negative.   Concern for congestive hepatopathy due to volume overload from HFpEF    Liver enzymes and total bilirubin remain elevated despite 3 days of dialysis  Also with pancreatitis    -Hold statin  -neprhology consulted, pending HD reqs   - worsening recurrently, noted to have more knees buckling, and difficulty with ambulation with home PT  - CTH notable for no acute changes, prior old R basal ganglia infarct noted, no prior hx of known CVA/TIA  - L knee xray and pelvic xray notable for: no acute fx/dislocations, lumbar degenerative changes with L knee tricompartment OA with slight medial tibiofemoral compartment predominance and generalized osteopenia  - check R knee xray for comparison   - PT eval  - given CTH notable for old R basal ganglia infarct and now with gait issues, neuro consulted for further input/eval; recs appreciated  - also with UTI as well, may also possibly be contributing  - monitor closely, safety precautions, ambulate with assistance

## 2024-10-09 NOTE — H&P ADULT - PROBLEM SELECTOR PLAN 3
- Cr 1.7 on admission, previously in 2023 wnl  - repeat Cr improved to 1.23 on its own, suspect from pre renal etiology given improvement in short time  - 500cc NS bolus over 2 hours  - given improvement, favor c/w pta losartan for now  - renally dose meds, avoid nephrotoxic agents    #Hypokalemia  #Hypomagnesemia  - repleted; monitor

## 2024-10-09 NOTE — PATIENT PROFILE ADULT - FUNCTIONAL ASSESSMENT - BASIC MOBILITY 6.
5/13/2021:  Prior to surgery, H&P dated 4/21/21 reviewed along with patient's physical exam.  No changes are noted.  Blood pressure 112/66, pulse 69, temperature 97.2 °F (36.2 °C), temperature source Temporal, resp. rate 18, height 4' 8\" (1.422 m), weight 54.4 kg, SpO2 96 %.  Proceed as planned with diagnostic cerebral angiogram.  Risks and benefits discussed with patient/family.  Consent signed and in chart.     2 = A lot of assistance

## 2024-10-09 NOTE — H&P ADULT - PROBLEM SELECTOR PLAN 8
#Hypothyroidism  - TSH elevated in 8s, with acute infection  - c/w PTA dose of 37.5mcg levothyroxine  - OP recheck for dose adjustment after infection resolution    #Anxiety/depression  - c/w PTA buspar, effexor

## 2024-10-09 NOTE — PATIENT PROFILE ADULT - NSPROPTRIGHTBILLOFRIGHTS_GEN_A_NUR
Problem: Activity Intolerance  Goal: Enhanced Capacity and Energy  Outcome: Ongoing, Progressing  Intervention: Optimize Activity Tolerance  Flowsheets (Taken 12/11/2020 0815)  Self-Care Promotion: independence encouraged  Activity Management:   ambulated - L4   ambulated to bathroom - L4   ambulated in zimmerman - L4  Environmental Support:   calm environment promoted   distractions minimized   rest periods encouraged      patient

## 2024-10-09 NOTE — PHARMACOTHERAPY INTERVENTION NOTE - COMMENTS
Medication list updated in Outpatient Medication Record ([facility]). Source: Yaron Lomeli    Home Medications:  atorvastatin 40 mg oral tablet: 1 tab(s) orally once a day (at bedtime)   busPIRone 10 mg oral tablet: 1 tab(s) orally 3 times a day   Effexor  mg oral capsule, extended release: 1 cap(s) orally once a day ..(total 225mg)   Effexor XR 75 mg oral capsule, extended release: 1 cap(s) orally once a day ...(total 225mg)  famotidine 20 mg oral tablet: 1 tab(s) orally once a day   gabapentin 400 mg oral capsule: 1 cap(s) orally once a day (at bedtime)   Januvia 100 mg oral tablet: 1 tab(s) orally once a day   Jardiance 25 mg oral tablet: 1 tab(s) orally once a day  levothyroxine 25 mcg (0.025 mg) oral tablet: 1.5 tab(s) orally once a day   losartan 25 mg oral tablet: 1 tab(s) orally once a day   metFORMIN 1000 mg oral tablet: 1 tab(s) orally 2 times a day           ABDOMINAL PAIN/NAUSEA/VOMITING

## 2024-10-09 NOTE — H&P ADULT - HISTORY OF PRESENT ILLNESS
Ms. Yu is an 82 y/o F with PMH of NIDDMT2, GERD, HTN, HLD, hypothyroidism, anxiety/depression, IBS who presented from home at request of OP physical therapist after pt noted to have recurrent falls, more unsteadiness in her gait with her legs buckling. Most recently fell 2 weeks ago, backwards, +headstrike but LOC. Pt reports L knee pain, intermittent burning on urination that has been ongoing for past 2 months. Lives with son, complaint with meds, has HHA as well, uses a walker to ambulate around. Daughter also reports having cognitive decline/issues, being more "zoned out", at times falling asleep on the toilet - but noted that has been ongoing for a long period of time.     Pt seen in ED, AAOx4, comfortable, not reporting any cp, sob, fevers, chills, n/v/c, reports loose BM (2x/day, brown, non watery), some mild L knee pain.     In the ED, VSS, afebrile with BP in 120s-140s/60s-70s, Labs notable for lactate of 4.7 -> 1.9 on repeat, WBC 8.6, Hb 13.3, increased RDW, K 3.6, Mg 1.7, Cr 1.73 -> 1.23 on repeat. CTH notable for no acute pathology, prior old basal ganglia infarct. LE dopplers negative for clot. CXR unimpressive for acute pathology. Xray for pelvis and L knee showing chronic  Ms. Yu is an 84 y/o F with PMH of NIDDMT2, GERD, HTN, HLD, hypothyroidism, anxiety/depression, IBS who presented from home at request of OP physical therapist after pt noted to have recurrent falls, more unsteadiness in her gait with her legs buckling. Most recently fell 2 weeks ago, backwards, +headstrike but LOC. Pt reports L knee pain, intermittent burning on urination that has been ongoing for past 2 months. Lives with son, complaint with meds, has HHA as well, uses a walker to ambulate around. Daughter also reports having cognitive decline/issues, being more "zoned out", at times falling asleep on the toilet - but noted that has been ongoing for a long period of time.     Pt seen in ED, AAOx4, comfortable, not reporting any cp, sob, fevers, chills, n/v/c, reports loose BM (2x/day, brown, non watery), some mild L knee pain.     In the ED, VSS, afebrile with BP in 120s-140s/60s-70s, Labs notable for lactate of 4.7 -> 1.9 on repeat, WBC 8.6, Hb 13.3, increased RDW, K 3.6, Mg 1.7, Cr 1.73 -> 1.23 on repeat. CTH notable for no acute pathology, prior old basal ganglia infarct. LE dopplers negative for clot. CXR unimpressive for acute pathology. Xray for pelvis and L knee showing chronic changes but no acute pathology appreciated.     Admitted to medicine for further workup and management.

## 2024-10-09 NOTE — CONSULT NOTE ADULT - ATTENDING COMMENTS
Ms. Yu is an 82 yo woman with chronic gait difficulty which is multifactorial - age, polyneuropathy, arthritis, chronic left leg weakness.  Chronic lacunar infarct on CT head.   I agree with work up and management as above.   Neurology signing off. Please reconsult PRN or call [phone] with any questions.   Thank you.

## 2024-10-09 NOTE — H&P ADULT - PROBLEM SELECTOR PLAN 2
- UA notable for +LE, WBC, +yeast, and glucose  - on Jardiance at home for DM  - sp CTX and Diflucan in the ED  - symptomatic presentation, appreciate ID input for management recommendations

## 2024-10-09 NOTE — CONSULT NOTE ADULT - SUBJECTIVE AND OBJECTIVE BOX
Neurology - Consult Note  -  Spectra: [phone] ([facility]), [phone] ([facility])  -    LKW: Unknown  pre-MRS- 2      HPI: Patient GLEN RODRIGUEZ is a 83y (1940) woman with a PMHx significant for NIDDMT2, GERD, HTN, HLD, hypothyroidism, who presented from home at request of OP physical therapist after pt noted to have recurrent falls, more unsteadiness in her gait with her left leg buckling. Neurology consulted for falls and CT head finding of chronic R. basal ganglia infarct.     As per patient and chart review, most recent fall 2 weeks ago, backwards, +head strike but LOC. Reports having frequent falls these past 3-4 years, has been using a walker for a few years and recently started using a rollator. Patient reports that she feels that in most falls it's the left leg giving away. She has some tingling and numbness of the feet which she's had for years and has long standing diabetes. Noted to have a UTI and elevated TSH as well. Denies acute onset weakness of one side of the body, speech difficulties or problems with vision.     NIHSS- 2 (left UE and LE drift)  Not a tenecteplase candidate   Not a thrombectomy candidate    Allergies:  No Known Allergies      PMHx/PSHx/Family Hx: As above, otherwise see below   HTN (hypertension)    DM (diabetes mellitus)    Anxiety    Sleep apnea    Hypothyroidism    Panic disorder    Depression    IBS (irritable bowel syndrome)    Noninfective gastroenteritis and colitis    Social Hx:  No current use of tobacco, alcohol, or illicit drugs    Medications:  MEDICATIONS  (STANDING):  atorvastatin 40 milliGRAM(s) Oral at bedtime  busPIRone 10 milliGRAM(s) Oral three times a day  dextrose 5%. 1000 milliLiter(s) (50 mL/Hr) IV Continuous <Continuous>  dextrose 5%. 1000 milliLiter(s) (100 mL/Hr) IV Continuous <Continuous>  dextrose 50% Injectable 25 Gram(s) IV Push once  dextrose 50% Injectable 25 Gram(s) IV Push once  dextrose 50% Injectable 12.5 Gram(s) IV Push once  famotidine    Tablet 20 milliGRAM(s) Oral <User Schedule>  gabapentin 400 milliGRAM(s) Oral at bedtime  glucagon  Injectable 1 milliGRAM(s) IntraMuscular once  heparin   Injectable 5000 Unit(s) SubCutaneous every 8 hours  insulin lispro (ADMELOG) corrective regimen sliding scale   SubCutaneous three times a day before meals  insulin lispro (ADMELOG) corrective regimen sliding scale   SubCutaneous at bedtime  lactated ringers Bolus 500 milliLiter(s) IV Bolus once  losartan 25 milliGRAM(s) Oral daily  magnesium sulfate  IVPB 2 Gram(s) IV Intermittent once  potassium chloride   Powder 20 milliEquivalent(s) Oral once  venlafaxine XR. 225 milliGRAM(s) Oral daily    MEDICATIONS  (PRN):  acetaminophen     Tablet .. 650 milliGRAM(s) Oral every 6 hours PRN Temp greater or equal to 38C (100.4F), Mild Pain (1 - 3)  dextrose Oral Gel 15 Gram(s) Oral once PRN Blood Glucose LESS THAN 70 milliGRAM(s)/deciliter  melatonin 3 milliGRAM(s) Oral at bedtime PRN Insomnia      Vitals:  T(C): 36.7 (10-09-24 @ 10:17), Max: 37.4 (10-09-24 @ 08:46)  HR: 82 (10-09-24 @ 10:17) (77 - 88)  BP: 119/57 (10-09-24 @ 10:17) (119/57 - 144/67)  RR: 16 (10-09-24 @ 10:17) (16 - 17)  SpO2: 97% (10-09-24 @ 10:17) (97% - 98%)    Physical Examination:  General -Pleasant, cooperative, edentulous  Cardiovascular - Mild edema  Neurologic Exam:  Mental status - Awake, Alert, Oriented to person, place, and time. Speech fluent, repetition and naming intact. Follows simple and complex commands. Attention/concentration, recent and remote memory (including registration and recall), and fund of knowledge intact    Cranial nerves:  CN II: Visual fields are full to confrontation. Pupils are 4 mm and briskly reactive to light.   CN III, IV, VI: EOMI, no nystagmus, no ptosis  CN V: Facial sensation is intact to touch in all 3 divisions bilaterally.  CN VII: Face is symmetric with normal eye closure and smile.  CN VII: Hearing is normal to rubbing fingers  CN IX, X: Palate elevates symmetrically  CN XI: Shoulder shrug are intact  CN XII: Tongue is midline with normal movements and no atrophy.    Motor - Normal bulk and tone throughout. No pronator drift of out-stretched arms. Drift in left arm.   Strength testing            Deltoid(C5)  Biceps(C6)    Triceps(C7)     Wrist Extension    Wrist Flexion (C8)     Interossei  (T1)       R            5                 5                        5                     5                              5                                      5                         5  L             5                 5                        5                     5                              5                                      5                          5              Hip Flexion(L2/3)    Hip Extension (L4/5)   Knee Flexion (L4/5/S1)    Knee Extension (L3/4)   Dorsiflexion (L4/5)   Plantar Flexion (S1)  R              4                                    4                                5                     5                                         5                          5  L              3                                    4                                 5              5                                              5                          5    Sensation - Light touch intact in extremities  DTR's -             Biceps      Triceps     Brachioradialis      Patellar    Ankle    Toes/plantar response  R             2+             2+                  2+                       2+            2+                 Down  L              2+             2+                 2+                        2+           2+                 Down    Coordination - Rapid alternating movements and fine finger movements are intact. There is no dysmetria on finger-to-nose and heel-knee-shin. There are no abnormal or extraneous movements. Romberg?    Gait and station - Posture is normal. Gait is steady with normal steps, base, arm swing, and turning. Heel and toe walking are normal. Tandem gait is normal       Labs:                        13.3   8.64  )-----------( 279      ( 08 Oct 2024 23:02 )             42.3     10-09    139  |  105  |  10  ----------------------------<  140[H]  3.9   |  22  |  1.24    Ca    8.5      09 Oct 2024 11:00  Phos  3.6     10-08  Mg     1.70     10-08    TPro  6.3  /  Alb  3.3  /  TBili  0.4  /  DBili  x   /  AST  18  /  ALT  6   /  AlkPhos  54  10-09    CAPILLARY BLOOD GLUCOSE      POCT Blood Glucose.: 139 mg/dL (09 Oct 2024 07:03)    LIVER FUNCTIONS - ( 09 Oct 2024 11:00 )  Alb: 3.3 g/dL / Pro: 6.3 g/dL / ALK PHOS: 54 U/L / ALT: 6 U/L / AST: 18 U/L / GGT: x             PT/INR - ( 08 Oct 2024 23:02 )   PT: 10.8 sec;   INR: 0.91 ratio         PTT - ( 08 Oct 2024 23:02 )  PTT:29.0 sec  CSF:          Radiology:  CT Head No Cont:  (09 Oct 2024 01:30)    < from: CT Head No Cont (10.09.24 @ 01:30) >  IMPRESSION:  No acute intracranial hemorrhage, mass effect, or midline shift.    < end of copied text >  < from: CT Head No Cont (10.09.24 @ 01:30) >  VENTRICLES AND SULCI: Involutional changes.  INTRA-AXIAL: There is an old right basal ganglia infarct, stable No mass   effect, acute hemorrhage, or midline shift.  There are periventricular   and subcortical white matter hypodensities, consistent with microvascular   type changes.    < end of copied text >       Neurology - Consult Note  -  Spectra: [phone] ([facility]), [phone] ([facility])  -    LKW: Unknown  pre-MRS- 2      HPI: Patient GLEN RODRIGUEZ is a 83y (1940) woman with a PMHx significant for NIDDMT2, GERD, HTN, HLD, hypothyroidism, who presented from home at request of OP physical therapist after pt noted to have recurrent falls, more unsteadiness in her gait with her left leg buckling. Neurology consulted for falls and CT head finding of chronic R. basal ganglia infarct.     As per patient and chart review, most recent fall 2 weeks ago, backwards, +head strike but LOC. Reports having frequent falls these past 3-4 years, has been using a walker for a few years and recently started using a rollator. Patient reports that she feels that in most falls it's the left leg giving away. She has some tingling and numbness of the feet which she's had for years and has long standing diabetes. Noted to have a UTI and elevated TSH as well. Denies acute onset weakness of one side of the body, speech difficulties or problems with vision.     NIHSS- 3 (left UE and LE drift hitting bed)  Not a tenecteplase candidate   Not a thrombectomy candidate    Allergies:  No Known Allergies      PMHx/PSHx/Family Hx: As above, otherwise see below   HTN (hypertension)    DM (diabetes mellitus)    Anxiety    Sleep apnea    Hypothyroidism    Panic disorder    Depression    IBS (irritable bowel syndrome)    Noninfective gastroenteritis and colitis    Social Hx:  No current use of tobacco, alcohol, or illicit drugs    Medications:  MEDICATIONS  (STANDING):  atorvastatin 40 milliGRAM(s) Oral at bedtime  busPIRone 10 milliGRAM(s) Oral three times a day  dextrose 5%. 1000 milliLiter(s) (50 mL/Hr) IV Continuous <Continuous>  dextrose 5%. 1000 milliLiter(s) (100 mL/Hr) IV Continuous <Continuous>  dextrose 50% Injectable 25 Gram(s) IV Push once  dextrose 50% Injectable 25 Gram(s) IV Push once  dextrose 50% Injectable 12.5 Gram(s) IV Push once  famotidine    Tablet 20 milliGRAM(s) Oral <User Schedule>  gabapentin 400 milliGRAM(s) Oral at bedtime  glucagon  Injectable 1 milliGRAM(s) IntraMuscular once  heparin   Injectable 5000 Unit(s) SubCutaneous every 8 hours  insulin lispro (ADMELOG) corrective regimen sliding scale   SubCutaneous three times a day before meals  insulin lispro (ADMELOG) corrective regimen sliding scale   SubCutaneous at bedtime  lactated ringers Bolus 500 milliLiter(s) IV Bolus once  losartan 25 milliGRAM(s) Oral daily  magnesium sulfate  IVPB 2 Gram(s) IV Intermittent once  potassium chloride   Powder 20 milliEquivalent(s) Oral once  venlafaxine XR. 225 milliGRAM(s) Oral daily    MEDICATIONS  (PRN):  acetaminophen     Tablet .. 650 milliGRAM(s) Oral every 6 hours PRN Temp greater or equal to 38C (100.4F), Mild Pain (1 - 3)  dextrose Oral Gel 15 Gram(s) Oral once PRN Blood Glucose LESS THAN 70 milliGRAM(s)/deciliter  melatonin 3 milliGRAM(s) Oral at bedtime PRN Insomnia      Vitals:  T(C): 36.7 (10-09-24 @ 10:17), Max: 37.4 (10-09-24 @ 08:46)  HR: 82 (10-09-24 @ 10:17) (77 - 88)  BP: 119/57 (10-09-24 @ 10:17) (119/57 - 144/67)  RR: 16 (10-09-24 @ 10:17) (16 - 17)  SpO2: 97% (10-09-24 @ 10:17) (97% - 98%)    Physical Examination:  General -Pleasant, cooperative, edentulous  Cardiovascular - Mild edema  Neurologic Exam:  Mental status - Awake, Alert, Oriented to person, place, and time. Speech fluent, repetition and naming intact. Follows simple and complex commands. Attention/concentration, recent and remote memory (including registration and recall), and fund of knowledge intact    Cranial nerves:  CN II: Visual fields are full to confrontation. Pupils are 4 mm and briskly reactive to light.   CN III, IV, VI: EOMI, no nystagmus, no ptosis  CN V: Facial sensation is intact to touch in all 3 divisions bilaterally.  CN VII: Face is symmetric with normal eye closure and smile.  CN VII: Hearing is normal to rubbing fingers  CN IX, X: Palate elevates symmetrically  CN XI: Shoulder shrug are intact  CN XII: Tongue is midline with normal movements and no atrophy.    Motor - Normal bulk and tone throughout. No pronator drift of out-stretched arms. Drift in left arm.   Strength testing            Deltoid(C5)  Biceps(C6)    Triceps(C7)     Wrist Extension    Wrist Flexion (C8)     Interossei  (T1)        R            4+                5                     4+                    5                              5              5                         5  L             4+                5                     4+                    5                              5              5                          5              Hip Flexion(L2/3)    Hip Extension (L4/5)   Knee Flexion (L4/5/S1)    Knee Extension (L3/4)   Dorsiflexion (L4/5)   Plantar Flexion (S1)  R              4                                    4                              4+                           4                                         5                          5  L              3                                    4                               4                             4                                         5                          5    Sensation - Light touch intact in extremities  DTR's -             Biceps      Triceps     Brachioradialis    Patellar    Ankle    Toes/plantar response  R             2+             2+                  2+            2+            2+                 Down  L              2+             2+                 2+             2+           2+                 Down    Coordination - Rapid alternating movements and fine finger movements are intact. There is no dysmetria on finger-to-nose and heel-knee-shin. There are no abnormal or extraneous movements.     Gait and station - Posture is normal. Gait not assessed due to patient safety concerns      Labs:                        13.3   8.64  )-----------( 279      ( 08 Oct 2024 23:02 )             42.3     10-09    139  |  105  |  10  ----------------------------<  140[H]  3.9   |  22  |  1.24    Ca    8.5      09 Oct 2024 11:00  Phos  3.6     10-08  Mg     1.70     10-08    TPro  6.3  /  Alb  3.3  /  TBili  0.4  /  DBili  x   /  AST  18  /  ALT  6   /  AlkPhos  54  10-09    CAPILLARY BLOOD GLUCOSE      POCT Blood Glucose.: 139 mg/dL (09 Oct 2024 07:03)    LIVER FUNCTIONS - ( 09 Oct 2024 11:00 )  Alb: 3.3 g/dL / Pro: 6.3 g/dL / ALK PHOS: 54 U/L / ALT: 6 U/L / AST: 18 U/L / GGT: x             PT/INR - ( 08 Oct 2024 23:02 )   PT: 10.8 sec;   INR: 0.91 ratio         PTT - ( 08 Oct 2024 23:02 )  PTT:29.0 sec  CSF:          Radiology:  CT Head No Cont (10.09.24 @ 01:30)   IMPRESSION:  VENTRICLES AND SULCI: Involutional changes.  INTRA-AXIAL: There is an old right basal ganglia infarct, stable No mass   effect, acute hemorrhage, or midline shift.  There are periventricular   and subcortical white matter hypodensities, consistent with microvascular   type changes.  No acute intracranial hemorrhage, mass effect, or midline shift.>

## 2024-10-09 NOTE — CONSULT NOTE ADULT - ASSESSMENT
INCOMPLETE    Assessment: ***. Initial VS in ED: ***. Exam: ***.      mRS: ***  LKN: ***  NIHSS: ***    Impression: ***    Recommendations:    Diagnostics:  [] MRI brain without contrast   [] MRA brain without contrast; MRA neck with contrast   [] TTE w/ bubble   [] Implantable loop recorder  [] Lipid panel, HbA1c  [] CBC, CMP, coagulation panel, troponin    Medications:  [] ASA 81 mg PO (325 per rectum if fails dysphagia)   [] Atorvastatin 80mg (titrate to LDL < 70)   [] Lovenox SQ for DVT prophylaxis     Miscellaneous:  [] NPO unless passes dysphagia screen; swallow eval if fails  [] Keep BP permissive up to 220/110 for 48 hours followed by gradual normotension over 2-3 days   [] Telemonitoring  [] Neurological checks and vital signs per unit protocol  [] BGM goals 140-180  [] PT/OT; S/S evaluation    * Case and plan not final until Attending attestation * Assessment:   Patient GLEN RODRIGUEZ is a 83y (1940) woman with a PMHx significant for NIDDMT2, GERD, HTN, HLD, hypothyroidism, who presented from home at request of OP physical therapist after pt noted to have recurrent falls, more unsteadiness in her gait with her left leg buckling. Neurology consulted for falls and CT head finding of chronic R. basal ganglia infarct. As per patient and chart review, most recent fall 2 weeks ago, backwards, +head strike but LOC. Reports having frequent falls these past 3-4 years, has been using a walker for a few years and recently started using a rollator. Patient reports that she feels that in most falls it's the left leg giving away. She has some tingling and numbness of the feet which she's had for years and has long standing diabetes. Noted to have a UTI and elevated TSH as well. Hba1c- 7.6. Weakness   Denies acute onset weakness of one side of the body, speech difficulties or problems with vision.    mRS: 2  LKN: 10/9/23:   NIHSS: 2    Impression: Falls due to buckling of left leg and unsteady, gait. Most recent fall 2 weeks ago with head-strike without any LOC. Etiology likely multifactorial includes chronic left leg weakness, UTI and  polyneuropathy     Recommendations:    Diagnostics:  [] No role of in-patient MRI at this point going chronicity of symptoms  [] Continue ASA 81 mg PO and home atorvastatin 40mg for secondary stroke prevention  [] DVT prophylaxis as per primary  [] Blood work- B12,  lipid profile. free T4  [] Normotension <140/90  [] PT/OT  [] Fall and aspiration precautions    Discussed with Dr. Valentine, neurology attending

## 2024-10-09 NOTE — ED ADULT NURSE REASSESSMENT NOTE - NS ED NURSE REASSESS COMMENT FT1
Break RN: Pt received in stretcher outside room 12. Pt resting comfortably. pt offered no complains at present. respiration even and non-labored. in NAD. VSS. Admitted to medicine, pending bed assignment.

## 2024-10-09 NOTE — H&P ADULT - NSHPLABSRESULTS_GEN_ALL_CORE
LABS:                         13.3   8.64  )-----------( 279      ( 08 Oct 2024 23:02 )             42.3     10-09    139  |  105  |  10  ----------------------------<  140[H]  3.9   |  22  |  1.24    Ca    8.5      09 Oct 2024 11:00  Phos  3.6     10-08  Mg     1.70     10-08    TPro  6.3  /  Alb  3.3  /  TBili  0.4  /  DBili  x   /  AST  18  /  ALT  6   /  AlkPhos  54  10-09    PT/INR - ( 08 Oct 2024 23:02 )   PT: 10.8 sec;   INR: 0.91 ratio         PTT - ( 08 Oct 2024 23:02 )  PTT:29.0 sec  Urinalysis Basic - ( 09 Oct 2024 11:00 )    Color: x / Appearance: x / SG: x / pH: x  Gluc: 140 mg/dL / Ketone: x  / Bili: x / Urobili: x   Blood: x / Protein: x / Nitrite: x   Leuk Esterase: x / RBC: x / WBC x   Sq Epi: x / Non Sq Epi: x / Bacteria: x      < from: Xray Chest 1 View- PORTABLE-Urgent (Xray Chest 1 View- PORTABLE-Urgent .) (10.09.24 @ 01:37) >    FINDINGS:    The heart size is normal.  No focal consolidation.  No pneumothorax or pleural effusion.    IMPRESSION:  No focal consolidation.    --- End of Report ---    < end of copied text >    < from: CT Head No Cont (10.09.24 @ 01:30) >    FINDINGS:    VENTRICLES AND SULCI: Involutional changes.  INTRA-AXIAL: There is an old right basal ganglia infarct, stable No mass   effect, acute hemorrhage, or midline shift.  There are periventricular   and subcortical white matter hypodensities, consistent with microvascular   type changes.  EXTRA-AXIAL: No mass or fluid collection. Basal cisterns are normal in   appearance.    VISUALIZED SINUSES:  Clear.  TYMPANOMASTOID CAVITIES:  Clear.  VISUALIZED ORBITS: Normal.  CALVARIUM: Intact.    MISCELLANEOUS: None.      IMPRESSION:  No acute intracranial hemorrhage, mass effect, or midline shift.    < end of copied text >    < from: Xray Knee 3 Views, Left (10.09.24 @ 00:52) >    COMPARISON: Pelvis and left knee x-rays 10/6/2023.    FINDINGS:  No acute fractures or dislocations in above imaged anatomic regions.  Intact pelvic and obturator rings and symmetrically aligned and spaced SI   joints and pubic symphysis.  Lumbar spine degenerative change. Left knee tricompartment osteoarthritis   with slight medial tibiofemoral compartment predominance.  Small nodular foci calcific tendinosis adjacent to right ischial   tuberosity margin.  Generalized osteopenia otherwise no discrete lytic or blastic lesions.    IMPRESSION:  No acute fracture or dislocation.    --- End of Report ---    < end of copied text >        RADIOLOGY, EKG & ADDITIONAL TESTS: Reviewed.

## 2024-10-09 NOTE — CONSULT NOTE ADULT - ASSESSMENT
This is a 82 y/o F w/ PMhx of HTN, HLD, DM2, hypothyroidism, who presented to [facility] on 10/9/24 for difficulty ambulating for last 3 months, falls.   In the ED, pt was afebrile, VSS. Labs: no leukcytosis, KINGSLEY to 1.73, lactate 4.7.   U/A positive 208,   CTH no acute findings, US b/l LE w/o DVT. CXR w/o consolidation     #UTI  #Frequent falls   #KINGSLEY    Overall,  82 y/o F w/ PMhx of HTN, HLD, DM2, hypothyroidism p/w difficulty ambulating, found to have positive U/A, c/f UTI, s/p 1 dose of ceftriaxone. UCx not sent. Pt with dysuria for the last few weeks, no CVA tenderness on exam, +suprapubic tenderness. Afebrile, no leukocytosis.     Recommend:   1. Ceftriaxone 1 g q24 x 3 day course   2. Would send UCx to guide therapy  3. Yeast is an infrequent cause of UTI, would not treat candida in the urine at this time     Thank you for this consult. Inpatient ID team will follow.    Akshat Gaytan M.D.  Attending Physician  Division of Infectious Diseases  Department of Medicine    Please contact through MS Teams message.  Office: [phone] (after 5 PM or weekend)

## 2024-10-09 NOTE — ED ADULT NURSE REASSESSMENT NOTE - NS ED NURSE REASSESS COMMENT FT1
Received pt at change of shift, PT is resting in stretcher, easily arousable to verbal stimuli, A+Ox4. no acute distress noted, pt arrives C/O multiple falls with head strike, no LOC, trouble ambulating and weakness to lower extremities. found to have UTI. Respirations even and unlabored, normal work of breathing, no accessory muscle use, speaking in full clear uninterrupted sentences. ABD is soft, non tender, non distended. Pt denies any chest pain, SOB, headache, dizziness, N+V, diarrhea, fever, chills.  no further concerns as of present, patient expresses no other needs at this time, call light within reach. Received pt at change of shift, PT is resting in stretcher, easily arousable to verbal stimuli, A+Ox4. no acute distress noted, pt arrives C/O multiple falls with head strike, no LOC, trouble ambulating and weakness to lower extremities. found to have UTI. Respirations even and unlabored, normal work of breathing, no accessory muscle use, speaking in full clear uninterrupted sentences. ABD is soft, non tender, non distended. Pt denies any chest pain, SOB, headache, dizziness, N+V, diarrhea, fever, chills. 20G to the RAC, no redness or swelling noted.   no further concerns as of present, patient expresses no other needs at this time, call light within reach.

## 2024-10-10 LAB
24R-OH-CALCIDIOL SERPL-MCNC: 31 NG/ML — SIGNIFICANT CHANGE UP (ref 30–80)
ALBUMIN SERPL ELPH-MCNC: 3.7 G/DL — SIGNIFICANT CHANGE UP (ref 3.3–5)
ALP SERPL-CCNC: 62 U/L — SIGNIFICANT CHANGE UP (ref 40–120)
ALT FLD-CCNC: 9 U/L — SIGNIFICANT CHANGE UP (ref 4–33)
ANION GAP SERPL CALC-SCNC: 13 MMOL/L — SIGNIFICANT CHANGE UP (ref 7–14)
AST SERPL-CCNC: 15 U/L — SIGNIFICANT CHANGE UP (ref 4–32)
BASOPHILS # BLD AUTO: 0.03 K/UL — SIGNIFICANT CHANGE UP (ref 0–0.2)
BASOPHILS NFR BLD AUTO: 0.4 % — SIGNIFICANT CHANGE UP (ref 0–2)
BILIRUB SERPL-MCNC: 0.6 MG/DL — SIGNIFICANT CHANGE UP (ref 0.2–1.2)
BUN SERPL-MCNC: 8 MG/DL — SIGNIFICANT CHANGE UP (ref 7–23)
CALCIUM SERPL-MCNC: 9.4 MG/DL — SIGNIFICANT CHANGE UP (ref 8.4–10.5)
CHLORIDE SERPL-SCNC: 102 MMOL/L — SIGNIFICANT CHANGE UP (ref 98–107)
CO2 SERPL-SCNC: 24 MMOL/L — SIGNIFICANT CHANGE UP (ref 22–31)
CREAT SERPL-MCNC: 1.03 MG/DL — SIGNIFICANT CHANGE UP (ref 0.5–1.3)
EGFR: 55 ML/MIN/1.73M2 — LOW
EOSINOPHIL # BLD AUTO: 0.16 K/UL — SIGNIFICANT CHANGE UP (ref 0–0.5)
EOSINOPHIL NFR BLD AUTO: 2.3 % — SIGNIFICANT CHANGE UP (ref 0–6)
GLUCOSE BLDC GLUCOMTR-MCNC: 108 MG/DL — HIGH (ref 70–99)
GLUCOSE BLDC GLUCOMTR-MCNC: 144 MG/DL — HIGH (ref 70–99)
GLUCOSE BLDC GLUCOMTR-MCNC: 161 MG/DL — HIGH (ref 70–99)
GLUCOSE BLDC GLUCOMTR-MCNC: 181 MG/DL — HIGH (ref 70–99)
GLUCOSE SERPL-MCNC: 134 MG/DL — HIGH (ref 70–99)
HCT VFR BLD CALC: 39 % — SIGNIFICANT CHANGE UP (ref 34.5–45)
HGB BLD-MCNC: 12.7 G/DL — SIGNIFICANT CHANGE UP (ref 11.5–15.5)
IANC: 3.68 K/UL — SIGNIFICANT CHANGE UP (ref 1.8–7.4)
IMM GRANULOCYTES NFR BLD AUTO: 0.1 % — SIGNIFICANT CHANGE UP (ref 0–0.9)
LYMPHOCYTES # BLD AUTO: 2.73 K/UL — SIGNIFICANT CHANGE UP (ref 1–3.3)
LYMPHOCYTES # BLD AUTO: 38.4 % — SIGNIFICANT CHANGE UP (ref 13–44)
MAGNESIUM SERPL-MCNC: 1.9 MG/DL — SIGNIFICANT CHANGE UP (ref 1.6–2.6)
MCHC RBC-ENTMCNC: 26.6 PG — LOW (ref 27–34)
MCHC RBC-ENTMCNC: 32.6 GM/DL — SIGNIFICANT CHANGE UP (ref 32–36)
MCV RBC AUTO: 81.8 FL — SIGNIFICANT CHANGE UP (ref 80–100)
MONOCYTES # BLD AUTO: 0.5 K/UL — SIGNIFICANT CHANGE UP (ref 0–0.9)
MONOCYTES NFR BLD AUTO: 7 % — SIGNIFICANT CHANGE UP (ref 2–14)
NEUTROPHILS # BLD AUTO: 3.68 K/UL — SIGNIFICANT CHANGE UP (ref 1.8–7.4)
NEUTROPHILS NFR BLD AUTO: 51.8 % — SIGNIFICANT CHANGE UP (ref 43–77)
NRBC # BLD: 0 /100 WBCS — SIGNIFICANT CHANGE UP (ref 0–0)
NRBC # FLD: 0 K/UL — SIGNIFICANT CHANGE UP (ref 0–0)
PLATELET # BLD AUTO: 262 K/UL — SIGNIFICANT CHANGE UP (ref 150–400)
POTASSIUM SERPL-MCNC: 3.9 MMOL/L — SIGNIFICANT CHANGE UP (ref 3.5–5.3)
POTASSIUM SERPL-SCNC: 3.9 MMOL/L — SIGNIFICANT CHANGE UP (ref 3.5–5.3)
PROT SERPL-MCNC: 7 G/DL — SIGNIFICANT CHANGE UP (ref 6–8.3)
RBC # BLD: 4.77 M/UL — SIGNIFICANT CHANGE UP (ref 3.8–5.2)
RBC # FLD: 14.6 % — HIGH (ref 10.3–14.5)
SODIUM SERPL-SCNC: 139 MMOL/L — SIGNIFICANT CHANGE UP (ref 135–145)
VIT B12 SERPL-MCNC: 641 PG/ML — SIGNIFICANT CHANGE UP (ref 200–900)
WBC # BLD: 7.11 K/UL — SIGNIFICANT CHANGE UP (ref 3.8–10.5)
WBC # FLD AUTO: 7.11 K/UL — SIGNIFICANT CHANGE UP (ref 3.8–10.5)

## 2024-10-10 PROCEDURE — 99232 SBSQ HOSP IP/OBS MODERATE 35: CPT

## 2024-10-10 RX ORDER — CEFTRIAXONE SODIUM 1 G
1000 VIAL (EA) INJECTION EVERY 24 HOURS
Refills: 0 | Status: COMPLETED | OUTPATIENT
Start: 2024-10-10 | End: 2024-10-11

## 2024-10-10 RX ADMIN — Medication 10 MILLIGRAM(S): at 22:13

## 2024-10-10 RX ADMIN — Medication 10 MILLIGRAM(S): at 17:50

## 2024-10-10 RX ADMIN — LOSARTAN POTASSIUM 25 MILLIGRAM(S): 100 TABLET, FILM COATED ORAL at 05:17

## 2024-10-10 RX ADMIN — Medication 5000 UNIT(S): at 22:13

## 2024-10-10 RX ADMIN — Medication 225 MILLIGRAM(S): at 13:50

## 2024-10-10 RX ADMIN — GABAPENTIN 400 MILLIGRAM(S): 800 TABLET, FILM COATED ORAL at 22:13

## 2024-10-10 RX ADMIN — Medication 81 MILLIGRAM(S): at 13:50

## 2024-10-10 RX ADMIN — Medication 10 MILLIGRAM(S): at 05:17

## 2024-10-10 RX ADMIN — Medication 20 MILLIGRAM(S): at 17:50

## 2024-10-10 RX ADMIN — Medication 5000 UNIT(S): at 05:17

## 2024-10-10 RX ADMIN — ATORVASTATIN CALCIUM 40 MILLIGRAM(S): 10 TABLET, FILM COATED ORAL at 22:13

## 2024-10-10 RX ADMIN — Medication 100 MILLIGRAM(S): at 13:54

## 2024-10-10 RX ADMIN — Medication 1: at 17:51

## 2024-10-10 RX ADMIN — Medication 37.5 MICROGRAM(S): at 05:17

## 2024-10-10 NOTE — PHYSICAL THERAPY INITIAL EVALUATION ADULT - ADDITIONAL COMMENTS
Pt lives in a private house with her son; there are 11 steps to enter. Has a home health aide 3-4 hrs/day.

## 2024-10-10 NOTE — DIETITIAN INITIAL EVALUATION ADULT - NS FNS DIET ORDER
Diet, Regular:   Consistent Carbohydrate {Evening Snack} (CSTCHOSN)  DASH/TLC {Sodium & Cholesterol Restricted} (DASH) (10-09-24 @ 10:18)

## 2024-10-10 NOTE — DIETITIAN INITIAL EVALUATION ADULT - EDUCATION DIETARY MODIFICATIONS
Importance of good adherence to consistent carbohydrate diet, having protein at each meal, and avoiding concentrated sweets and beverages. DM Myplate Method./(1) partially meets; needs review/practice/verbalization

## 2024-10-10 NOTE — ADVANCED PRACTICE NURSE CONSULT - RECOMMEDATIONS
Topical recommendations:     Sacrum to b/l buttocks: Cleanse with skin cleanser, pat dry. Apply Haris moisture barrier cream twice a day and PRN with incontinent episodes.    Apply Sween 24 Moisturizer to b/l UE and LE daily, avoiding in between the toes.     Continue low air loss bed therapy, continue heel elevation, continue to turn & reposition as per protocol, soft pillow between bony prominences, continue moisture management with barrier creams & single breathable pad, continue measures to decrease friction/shear/pressure. Continue with nutritional support as per dietary/orders.     Plan of care discussed with Primary RN Valeria, patient, and     Please contact Wound/Ostomy Care Service Line if we can be of further assistance (ext [phone]).  Topical recommendations:     Sacrum to b/l buttocks: Cleanse with skin cleanser, pat dry. Apply Haris moisture barrier cream twice a day and PRN with incontinent episodes.    Apply Sween 24 Moisturizer to b/l UE and LE daily, avoiding in between the toes.     Continue low air loss bed therapy, continue heel elevation, continue to turn & reposition as per protocol, soft pillow between bony prominences, continue moisture management with barrier creams & single breathable pad, continue measures to decrease friction/shear/pressure. Continue with nutritional support as per dietary/orders.     Plan of care discussed with Primary RN Valeria, dominique, and Julita Noriega (NP).    Please contact Wound/Ostomy Care Service Line if we can be of further assistance (ext [phone]).

## 2024-10-10 NOTE — DIETITIAN INITIAL EVALUATION ADULT - PERTINENT LABORATORY DATA
10-10    139  |  102  |  8   ----------------------------<  134[H]  3.9   |  24  |  1.03    Ca    9.4      10 Oct 2024 06:30  Phos  3.6     10-08  Mg     1.90     10-10    TPro  7.0  /  Alb  3.7  /  TBili  0.6  /  DBili  x   /  AST  15  /  ALT  9   /  AlkPhos  62  10-10  POCT Blood Glucose.: 144 mg/dL (10-10-24 @ 12:05)  A1C with Estimated Average Glucose Result: 7.8 % (10-09-24 @ 11:00)

## 2024-10-10 NOTE — ADVANCED PRACTICE NURSE CONSULT - ASSESSMENT
General: A&Ox4, patient currently sitting in chair after working with physical therapy a few minutes prior to introduction. Able to ambulate with x2 assistance, incontinent of urine and stool at times. Patient has female external urinary  (Primafit) in place. Skin warm, dry with increased moisture in intertriginous folds, scattered areas of hyperpigmentation and hypopigmentation, scattered areas of ecchymosis without hematoma on bilateral upper extremities.     Vascular of b/l lower extremities: Bilateral lower extremities with scattered areas of hyper and hypopigmentation. Dry, flaky skin. No temperature changes noted. +1 Edema. Capillary refill less than 3 seconds. +2 DP/PT pulses. Left knee with dry, flakey skin and scattered dried serosanguinous scabs - no s/s of infection.     Sacrum to b/l buttocks: Moisture associated dermatitis as evidenced by hyperpigmentation, blanchable erythema, and linear fissure within sacral/gluteal cleft. Unable to be visualize in natural anatomical position. Impaired skin with irregular borders, exposing pink-moist dermis. Does not overly edita prominences. No suspected candidiasis.

## 2024-10-10 NOTE — DIETITIAN INITIAL EVALUATION ADULT - OTHER INFO
Patient reports having good appetite and po intake. Ate potatoes, scramble egg, milk and tea for breakfast which she reports having 100%. Patient denies any nausea/vomiting/diarrhea/constipation or difficulty chewing and swallowing. No bowel movement since admission. Diet recommendation reviewed.

## 2024-10-10 NOTE — PHYSICAL THERAPY INITIAL EVALUATION ADULT - GAIT DEVIATIONS NOTED, PT EVAL
forward flexed posture with downward gaze; poor rolling walker negotiation; retropulsive/decreased christiano/decreased velocity of limb motion/decreased step length/decreased weight-shifting ability

## 2024-10-10 NOTE — DIETITIAN INITIAL EVALUATION ADULT - PERTINENT MEDS FT
MEDICATIONS  (STANDING):  aspirin enteric coated 81 milliGRAM(s) Oral daily  atorvastatin 40 milliGRAM(s) Oral at bedtime  busPIRone 10 milliGRAM(s) Oral three times a day  cefTRIAXone   IVPB 1000 milliGRAM(s) IV Intermittent every 24 hours  dextrose 5%. 1000 milliLiter(s) (50 mL/Hr) IV Continuous <Continuous>  dextrose 5%. 1000 milliLiter(s) (100 mL/Hr) IV Continuous <Continuous>  dextrose 50% Injectable 25 Gram(s) IV Push once  dextrose 50% Injectable 25 Gram(s) IV Push once  dextrose 50% Injectable 12.5 Gram(s) IV Push once  famotidine    Tablet 20 milliGRAM(s) Oral <User Schedule>  gabapentin 400 milliGRAM(s) Oral at bedtime  glucagon  Injectable 1 milliGRAM(s) IntraMuscular once  heparin   Injectable 5000 Unit(s) SubCutaneous every 8 hours  insulin lispro (ADMELOG) corrective regimen sliding scale   SubCutaneous three times a day before meals  insulin lispro (ADMELOG) corrective regimen sliding scale   SubCutaneous at bedtime  levothyroxine 37.5 MICROGram(s) Oral daily  losartan 25 milliGRAM(s) Oral daily  venlafaxine XR. 225 milliGRAM(s) Oral daily    MEDICATIONS  (PRN):  acetaminophen     Tablet .. 650 milliGRAM(s) Oral every 6 hours PRN Temp greater or equal to 38C (100.4F), Mild Pain (1 - 3)  dextrose Oral Gel 15 Gram(s) Oral once PRN Blood Glucose LESS THAN 70 milliGRAM(s)/deciliter  melatonin 3 milliGRAM(s) Oral at bedtime PRN Insomnia

## 2024-10-10 NOTE — PHYSICAL THERAPY INITIAL EVALUATION ADULT - PERTINENT HX OF CURRENT PROBLEM, REHAB EVAL
Pt is an 83 year old female presenting for recurrent falls associated with more unsteadiness in her gait with her legs buckling. Bilateral knee and pelvis XR negative for acute fractures/dislocations. CXR no focal consolidations. Bilateral LE US duplex negative for DVT. Pt admitted for unsteady gait when walking and acute UTI.

## 2024-10-10 NOTE — ADVANCED PRACTICE NURSE CONSULT - REASON FOR CONSULT
Patient seen on skin care rounds after wound care referral received for assessment of skin impairment and recommendations of topical management. As per H&P, patient is a  83F with hx of HNT, HLD, DM, hypothyroidism, presents to the ED complaining of difficulty ambulating x 3 months. Chart reviewed: WBC 7.11, H/H 12.7/39.0, Platelets 262, INR 0.91, Serum albumin 3.7, A1C 7.8, BMI 28.4, Beltran 16.

## 2024-10-10 NOTE — PROGRESS NOTE ADULT - SUBJECTIVE AND OBJECTIVE BOX
[facility] Department of Hospital Medicine  Eladia Cid MD  Available on MS Teams  Pager: [phone]    Patient is a 83y old  Female who presents with a chief complaint of Candidiasis     Per chart review, 82 y/o F w/ PMhx of HTN, HLD, DM2 (HbA1c 7.8% 10/9/24), hypothyroidism, who presented to [facility] on 10/9/24 for difficulty ambulating for last 3 months, falls.    (10 Oct 2024 13:08)    OVERNIGHT EVENTS: No acute events overnight.    SUBJECTIVE: Pt seen and examined at bedside this morning.     ADDITIONAL REVIEW OF SYSTEMS: as above.    MEDICATIONS  (STANDING):  aspirin enteric coated 81 milliGRAM(s) Oral daily  atorvastatin 40 milliGRAM(s) Oral at bedtime  busPIRone 10 milliGRAM(s) Oral three times a day  cefTRIAXone   IVPB 1000 milliGRAM(s) IV Intermittent every 24 hours  dextrose 5%. 1000 milliLiter(s) (50 mL/Hr) IV Continuous <Continuous>  dextrose 5%. 1000 milliLiter(s) (100 mL/Hr) IV Continuous <Continuous>  dextrose 50% Injectable 25 Gram(s) IV Push once  dextrose 50% Injectable 12.5 Gram(s) IV Push once  dextrose 50% Injectable 25 Gram(s) IV Push once  famotidine    Tablet 20 milliGRAM(s) Oral <User Schedule>  gabapentin 400 milliGRAM(s) Oral at bedtime  glucagon  Injectable 1 milliGRAM(s) IntraMuscular once  heparin   Injectable 5000 Unit(s) SubCutaneous every 8 hours  insulin lispro (ADMELOG) corrective regimen sliding scale   SubCutaneous at bedtime  insulin lispro (ADMELOG) corrective regimen sliding scale   SubCutaneous three times a day before meals  levothyroxine 37.5 MICROGram(s) Oral daily  losartan 25 milliGRAM(s) Oral daily  venlafaxine XR. 225 milliGRAM(s) Oral daily    MEDICATIONS  (PRN):  acetaminophen     Tablet .. 650 milliGRAM(s) Oral every 6 hours PRN Temp greater or equal to 38C (100.4F), Mild Pain (1 - 3)  dextrose Oral Gel 15 Gram(s) Oral once PRN Blood Glucose LESS THAN 70 milliGRAM(s)/deciliter  melatonin 3 milliGRAM(s) Oral at bedtime PRN Insomnia    CAPILLARY BLOOD GLUCOSE    POCT Blood Glucose.: 144 mg/dL (10 Oct 2024 12:05)  POCT Blood Glucose.: 108 mg/dL (10 Oct 2024 08:51)  POCT Blood Glucose.: 138 mg/dL (09 Oct 2024 23:34)  POCT Blood Glucose.: 124 mg/dL (09 Oct 2024 20:58)  POCT Blood Glucose.: 110 mg/dL (09 Oct 2024 16:25)    I&O's Summary    09 Oct 2024 07:01  -  10 Oct 2024 07:00  --------------------------------------------------------  IN: 0 mL / OUT: 500 mL / NET: -500 mL    10 Oct 2024 07:01  -  10 Oct 2024 15:58  --------------------------------------------------------  IN: 550 mL / OUT: 700 mL / NET: -150 mL    PHYSICAL EXAM:  Vital Signs Last 24 Hrs  T(C): 37.1 (10 Oct 2024 14:33), Max: 37.7 (10 Oct 2024 10:01)  T(F): 98.8 (10 Oct 2024 14:33), Max: 99.8 (10 Oct 2024 10:01)  HR: 85 (10 Oct 2024 14:33) (82 - 94)  BP: 120/62 (10 Oct 2024 14:33) (104/80 - 153/79)  BP(mean): --  RR: 17 (10 Oct 2024 14:33) (17 - 18)  SpO2: 99% (10 Oct 2024 14:33) (95% - 99%)    Parameters below as of 10 Oct 2024 14:33  Patient On (Oxygen Delivery Method): room air    CONSTITUTIONAL: NAD, well-developed  HEAD: Normocephalic, atraumatic  EYES: EOMI, PERRL  ENT: no rhinorrhea, no pharyngeal erythema  RESPIRATORY: No increased work of breathing, CTAB, no wheezes or crackles appreciated  CARDIOVASCULAR: RRR, S1 and S2 present, no m/r/g  ABDOMEN: soft, NT, ND, bowel sounds present  EXTREMITIES: trace b/l LE edema  MUSCULOSKELETAL: no joint swelling, no tenderness to palpation  NEURO: moving all extremities    LABS:                        12.7   7.11  )-----------( 262      ( 10 Oct 2024 06:30 )             39.0     10-10    139  |  102  |  8   ----------------------------<  134[H]  3.9   |  24  |  1.03    Ca    9.4      10 Oct 2024 06:30  Phos  3.6     10-08  Mg     1.90     10-10    TPro  7.0  /  Alb  3.7  /  TBili  0.6  /  DBili  x   /  AST  15  /  ALT  9   /  AlkPhos  62  10-10    PT/INR - ( 08 Oct 2024 23:02 )   PT: 10.8 sec;   INR: 0.91 ratio       PTT - ( 08 Oct 2024 23:02 )  PTT:29.0 sec    Urinalysis Basic - ( 10 Oct 2024 06:30 )    Color: x / Appearance: x / SG: x / pH: x  Gluc: 134 mg/dL / Ketone: x  / Bili: x / Urobili: x   Blood: x / Protein: x / Nitrite: x   Leuk Esterase: x / RBC: x / WBC x   Sq Epi: x / Non Sq Epi: x / Bacteria: x    RADIOLOGY & ADDITIONAL TESTS:    Results Reviewed: Y  Imaging Personally Reviewed:   Electrocardiogram Personally Reviewed:    COORDINATION OF CARE:  Care Discussed with Consultants/Other Providers [Y/N]: Y, discussed with medicine ACP  Prior or Outpatient Records Reviewed [Y/N]:

## 2024-10-10 NOTE — PROGRESS NOTE ADULT - SUBJECTIVE AND OBJECTIVE BOX
Infectious Diseases Follow Up:    Patient is a 83y old  Female who presents with a chief complaint of Recurrent Falls, UTI (09 Oct 2024 15:32)      Interval History/ROS:  No acute events, pt states dysuria is improved     Allergies  No Known Allergies        ANTIMICROBIALS:  cefTRIAXone   IVPB 1000 every 24 hours      Current Abx:     Previous Abx     OTHER MEDS:  MEDICATIONS  (STANDING):  acetaminophen     Tablet .. 650 every 6 hours PRN  aspirin enteric coated 81 daily  atorvastatin 40 at bedtime  busPIRone 10 three times a day  dextrose 50% Injectable 25 once  dextrose 50% Injectable 25 once  dextrose 50% Injectable 12.5 once  dextrose Oral Gel 15 once PRN  famotidine    Tablet 20 <User Schedule>  gabapentin 400 at bedtime  glucagon  Injectable 1 once  heparin   Injectable 5000 every 8 hours  insulin lispro (ADMELOG) corrective regimen sliding scale  three times a day before meals  insulin lispro (ADMELOG) corrective regimen sliding scale  at bedtime  levothyroxine 37.5 daily  losartan 25 daily  melatonin 3 at bedtime PRN  venlafaxine XR. 225 daily      Vital Signs Last 24 Hrs  T(C): 37.7 (10 Oct 2024 10:01), Max: 37.7 (10 Oct 2024 10:01)  T(F): 99.8 (10 Oct 2024 10:01), Max: 99.8 (10 Oct 2024 10:01)  HR: 84 (10 Oct 2024 10:01) (82 - 94)  BP: 114/55 (10 Oct 2024 10:01) (104/80 - 153/79)  BP(mean): --  RR: 18 (10 Oct 2024 10:01) (17 - 18)  SpO2: 98% (10 Oct 2024 10:01) (95% - 98%)    Parameters below as of 10 Oct 2024 10:01  Patient On (Oxygen Delivery Method): room air        PHYSICAL EXAM:  GENERAL: NAD, well-developed  HEAD:  Atraumatic, Normocephalic  EYES: EOMI,, conjunctiva and sclera clear  CHEST/LUNG: On RA, not in respiratory distress   PSYCH: AAOx3                          12.7   7.11  )-----------( 262      ( 10 Oct 2024 06:30 )             39.0       10-10    139  |  102  |  8   ----------------------------<  134[H]  3.9   |  24  |  1.03    Ca    9.4      10 Oct 2024 06:30  Phos  3.6     10-08  Mg     1.90     10-10    TPro  7.0  /  Alb  3.7  /  TBili  0.6  /  DBili  x   /  AST  15  /  ALT  9   /  AlkPhos  62  10-10      Urinalysis Basic - ( 10 Oct 2024 06:30 )    Color: x / Appearance: x / SG: x / pH: x  Gluc: 134 mg/dL / Ketone: x  / Bili: x / Urobili: x   Blood: x / Protein: x / Nitrite: x   Leuk Esterase: x / RBC: x / WBC x   Sq Epi: x / Non Sq Epi: x / Bacteria: x        MICROBIOLOGY:  v            RADIOLOGY:

## 2024-10-10 NOTE — PHYSICAL THERAPY INITIAL EVALUATION ADULT - PHYSICAL ASSIST/NONPHYSICAL ASSIST: SIT/STAND, REHAB EVAL
----- Message from Fina Body MD sent at 2/23/2023  7:55 AM EST -----  Please remind to go for BMP     ----- Message -----  From: SYSTEM  Sent: 2/23/2023   1:27 AM EST  To: Fina Boyd MD
Pt aware to go for blood work  
verbal cues/nonverbal cues (demo/gestures)/1 person assist

## 2024-10-10 NOTE — DIETITIAN INITIAL EVALUATION ADULT - COLLABORATION WITH OTHER PROVIDERS
Suggest outpatient follow up with an endocrinologist to ensure long-term DM diet comprehension and compliance.   Suggest outpatient follow up with appropriate RD for the purposes of long-term nutrition evaluation and diet education.

## 2024-10-10 NOTE — PHYSICAL THERAPY INITIAL EVALUATION ADULT - GENERAL OBSERVATIONS, REHAB EVAL
Upon entry, pt semi-supine in bed in NAD. HR 85 bpm. Pt left seated in bedside chair with all tubes/lines intact, call bell in reach and in NAD. KIMBERLY Shaw notified as no chair alarms present on unit.

## 2024-10-10 NOTE — DIETITIAN INITIAL EVALUATION ADULT - REASON FOR ADMISSION
Candidiasis     Per chart review, 82 y/o F w/ PMhx of HTN, HLD, DM2 (HbA1c 7.8% 10/9/24), hypothyroidism, who presented to [facility] on 10/9/24 for difficulty ambulating for last 3 months, falls.

## 2024-10-10 NOTE — DIETITIAN INITIAL EVALUATION ADULT - PROBLEM SELECTOR PLAN 1
- worsening recurrently, noted to have more knees buckling, and difficulty with ambulation with home PT  - CTH notable for no acute changes, prior old R basal ganglia infarct noted, no prior hx of known CVA/TIA  - L knee xray and pelvic xray notable for: no acute fx/dislocations, lumbar degenerative changes with L knee tricompartment OA with slight medial tibiofemoral compartment predominance and generalized osteopenia  - check R knee xray for comparison   - PT eval  - given CTH notable for old R basal ganglia infarct and now with gait issues, neuro consulted for further input/eval; recs appreciated  - also with UTI as well, may also possibly be contributing  - monitor closely, safety precautions, ambulate with assistance

## 2024-10-10 NOTE — DIETITIAN INITIAL EVALUATION ADULT - ENTER TO (G/KG)
[facility]        Individual  Progress Note    Location: [x] [place] [] [facility]                   Patient Name: Stiven Mendez   : 1973     Case # :  [id number]  Therapist: OSCAR Shirley        Objective/Service/Time:   CASE MANAGEMENT  . 48    S:  I met with client about documents requested for Medicaid update. Sent necessary documents to [facility]. Also discussed client contacting [employer] for pill form of Vivitrol. O:  Client was oriented and open to discussion. A:  Client was provided resources about [employer] [place] and will make contact. P:  Client will update me on status of Medicaid benefit. Client will continue to speak with Therapist or  about requests for assistance if needed.       ANITHA Shirley, [facility], CTTS       Electronically signed by Clark Platt on 2023 at 10:57 AM 1.2

## 2024-10-10 NOTE — DIETITIAN INITIAL EVALUATION ADULT - ORAL INTAKE PTA/DIET HISTORY
Patient reports good appetite and po intake PTA. Son helps to cook and prepare meals for patient. NKFA. Patient is edentulous but does not wear her dentures. Reports she is use to eating regular food without her dentures. Denies any chewing issues. Avoids concentrated sweets and beverages. Thinks her usual weight have been around 140lbs. Per [facility] HIE: 71.2kg 6/23/23 and now 72.6kg/159.7lbs on 10/9/24-weight relatively stable.

## 2024-10-10 NOTE — DIETITIAN INITIAL EVALUATION ADULT - RD TO REMAIN AVAILABLE
I spoke to father this PM and she has continued to have pain and not want to eat. Labs still pending but drawn on Saturday.  Will have nurse check on labs in AM to see if an are back so can call parents yes

## 2024-10-11 DIAGNOSIS — R74.8 ABNORMAL LEVELS OF OTHER SERUM ENZYMES: ICD-10-CM

## 2024-10-11 LAB
ALBUMIN SERPL ELPH-MCNC: 3.9 G/DL — SIGNIFICANT CHANGE UP (ref 3.3–5)
ALBUMIN SERPL ELPH-MCNC: 3.9 G/DL — SIGNIFICANT CHANGE UP (ref 3.3–5)
ALP SERPL-CCNC: 35 U/L — LOW (ref 40–120)
ALP SERPL-CCNC: 61 U/L — SIGNIFICANT CHANGE UP (ref 40–120)
ALT FLD-CCNC: 11 U/L — SIGNIFICANT CHANGE UP (ref 4–33)
ALT FLD-CCNC: 9 U/L — SIGNIFICANT CHANGE UP (ref 4–33)
ANION GAP SERPL CALC-SCNC: 10 MMOL/L — SIGNIFICANT CHANGE UP (ref 7–14)
ANION GAP SERPL CALC-SCNC: 11 MMOL/L — SIGNIFICANT CHANGE UP (ref 7–14)
AST SERPL-CCNC: 14 U/L — SIGNIFICANT CHANGE UP (ref 4–32)
AST SERPL-CCNC: 16 U/L — SIGNIFICANT CHANGE UP (ref 4–32)
BASOPHILS # BLD AUTO: 0.02 K/UL — SIGNIFICANT CHANGE UP (ref 0–0.2)
BASOPHILS NFR BLD AUTO: 0.1 % — SIGNIFICANT CHANGE UP (ref 0–2)
BILIRUB SERPL-MCNC: 0.3 MG/DL — SIGNIFICANT CHANGE UP (ref 0.2–1.2)
BILIRUB SERPL-MCNC: 1.7 MG/DL — HIGH (ref 0.2–1.2)
BUN SERPL-MCNC: 10 MG/DL — SIGNIFICANT CHANGE UP (ref 7–23)
BUN SERPL-MCNC: 10 MG/DL — SIGNIFICANT CHANGE UP (ref 7–23)
CALCIUM SERPL-MCNC: 8.1 MG/DL — LOW (ref 8.4–10.5)
CALCIUM SERPL-MCNC: 9.6 MG/DL — SIGNIFICANT CHANGE UP (ref 8.4–10.5)
CHLORIDE SERPL-SCNC: 102 MMOL/L — SIGNIFICANT CHANGE UP (ref 98–107)
CHLORIDE SERPL-SCNC: 103 MMOL/L — SIGNIFICANT CHANGE UP (ref 98–107)
CO2 SERPL-SCNC: 24 MMOL/L — SIGNIFICANT CHANGE UP (ref 22–31)
CO2 SERPL-SCNC: 26 MMOL/L — SIGNIFICANT CHANGE UP (ref 22–31)
CREAT SERPL-MCNC: 0.49 MG/DL — LOW (ref 0.5–1.3)
CREAT SERPL-MCNC: 1.04 MG/DL — SIGNIFICANT CHANGE UP (ref 0.5–1.3)
CULTURE RESULTS: NO GROWTH — SIGNIFICANT CHANGE UP
EGFR: 54 ML/MIN/1.73M2 — LOW
EGFR: 95 ML/MIN/1.73M2 — SIGNIFICANT CHANGE UP
EOSINOPHIL # BLD AUTO: 0 K/UL — SIGNIFICANT CHANGE UP (ref 0–0.5)
EOSINOPHIL NFR BLD AUTO: 0 % — SIGNIFICANT CHANGE UP (ref 0–6)
GLUCOSE BLDC GLUCOMTR-MCNC: 135 MG/DL — HIGH (ref 70–99)
GLUCOSE BLDC GLUCOMTR-MCNC: 146 MG/DL — HIGH (ref 70–99)
GLUCOSE BLDC GLUCOMTR-MCNC: 246 MG/DL — HIGH (ref 70–99)
GLUCOSE BLDC GLUCOMTR-MCNC: 256 MG/DL — HIGH (ref 70–99)
GLUCOSE SERPL-MCNC: 115 MG/DL — HIGH (ref 70–99)
GLUCOSE SERPL-MCNC: 196 MG/DL — HIGH (ref 70–99)
HCT VFR BLD CALC: 32 % — LOW (ref 34.5–45)
HCT VFR BLD CALC: 41.6 % — SIGNIFICANT CHANGE UP (ref 34.5–45)
HGB BLD-MCNC: 11.2 G/DL — LOW (ref 11.5–15.5)
HGB BLD-MCNC: 13.2 G/DL — SIGNIFICANT CHANGE UP (ref 11.5–15.5)
IANC: 11.9 K/UL — HIGH (ref 1.8–7.4)
IMM GRANULOCYTES NFR BLD AUTO: 0.3 % — SIGNIFICANT CHANGE UP (ref 0–0.9)
LYMPHOCYTES # BLD AUTO: 1.64 K/UL — SIGNIFICANT CHANGE UP (ref 1–3.3)
LYMPHOCYTES # BLD AUTO: 11 % — LOW (ref 13–44)
MAGNESIUM SERPL-MCNC: 1.7 MG/DL — SIGNIFICANT CHANGE UP (ref 1.6–2.6)
MCHC RBC-ENTMCNC: 26.8 PG — LOW (ref 27–34)
MCHC RBC-ENTMCNC: 31.7 GM/DL — LOW (ref 32–36)
MCHC RBC-ENTMCNC: 32.9 PG — SIGNIFICANT CHANGE UP (ref 27–34)
MCHC RBC-ENTMCNC: 35 GM/DL — SIGNIFICANT CHANGE UP (ref 32–36)
MCV RBC AUTO: 84.6 FL — SIGNIFICANT CHANGE UP (ref 80–100)
MCV RBC AUTO: 94.1 FL — SIGNIFICANT CHANGE UP (ref 80–100)
MONOCYTES # BLD AUTO: 1.25 K/UL — HIGH (ref 0–0.9)
MONOCYTES NFR BLD AUTO: 8.4 % — SIGNIFICANT CHANGE UP (ref 2–14)
NEUTROPHILS # BLD AUTO: 11.9 K/UL — HIGH (ref 1.8–7.4)
NEUTROPHILS NFR BLD AUTO: 80.2 % — HIGH (ref 43–77)
NRBC # BLD: 0 /100 WBCS — SIGNIFICANT CHANGE UP (ref 0–0)
NRBC # BLD: 0 /100 WBCS — SIGNIFICANT CHANGE UP (ref 0–0)
NRBC # FLD: 0 K/UL — SIGNIFICANT CHANGE UP (ref 0–0)
NRBC # FLD: 0.02 K/UL — HIGH (ref 0–0)
PLATELET # BLD AUTO: 240 K/UL — SIGNIFICANT CHANGE UP (ref 150–400)
PLATELET # BLD AUTO: 263 K/UL — SIGNIFICANT CHANGE UP (ref 150–400)
POTASSIUM SERPL-MCNC: 4 MMOL/L — SIGNIFICANT CHANGE UP (ref 3.5–5.3)
POTASSIUM SERPL-MCNC: 4.7 MMOL/L — SIGNIFICANT CHANGE UP (ref 3.5–5.3)
POTASSIUM SERPL-SCNC: 4 MMOL/L — SIGNIFICANT CHANGE UP (ref 3.5–5.3)
POTASSIUM SERPL-SCNC: 4.7 MMOL/L — SIGNIFICANT CHANGE UP (ref 3.5–5.3)
PROT SERPL-MCNC: 5.9 G/DL — LOW (ref 6–8.3)
PROT SERPL-MCNC: 7.2 G/DL — SIGNIFICANT CHANGE UP (ref 6–8.3)
RBC # BLD: 3.4 M/UL — LOW (ref 3.8–5.2)
RBC # BLD: 4.92 M/UL — SIGNIFICANT CHANGE UP (ref 3.8–5.2)
RBC # FLD: 11.9 % — SIGNIFICANT CHANGE UP (ref 10.3–14.5)
RBC # FLD: 14.7 % — HIGH (ref 10.3–14.5)
SODIUM SERPL-SCNC: 136 MMOL/L — SIGNIFICANT CHANGE UP (ref 135–145)
SODIUM SERPL-SCNC: 140 MMOL/L — SIGNIFICANT CHANGE UP (ref 135–145)
SPECIMEN SOURCE: SIGNIFICANT CHANGE UP
WBC # BLD: 14.86 K/UL — HIGH (ref 3.8–10.5)
WBC # BLD: 8.3 K/UL — SIGNIFICANT CHANGE UP (ref 3.8–10.5)
WBC # FLD AUTO: 14.86 K/UL — HIGH (ref 3.8–10.5)
WBC # FLD AUTO: 8.3 K/UL — SIGNIFICANT CHANGE UP (ref 3.8–10.5)

## 2024-10-11 PROCEDURE — 76700 US EXAM ABDOM COMPLETE: CPT | Mod: 26

## 2024-10-11 PROCEDURE — 99232 SBSQ HOSP IP/OBS MODERATE 35: CPT

## 2024-10-11 RX ADMIN — Medication 10 MILLIGRAM(S): at 13:47

## 2024-10-11 RX ADMIN — Medication 5000 UNIT(S): at 13:47

## 2024-10-11 RX ADMIN — Medication 10 MILLIGRAM(S): at 07:27

## 2024-10-11 RX ADMIN — LOSARTAN POTASSIUM 25 MILLIGRAM(S): 100 TABLET, FILM COATED ORAL at 08:38

## 2024-10-11 RX ADMIN — ATORVASTATIN CALCIUM 40 MILLIGRAM(S): 10 TABLET, FILM COATED ORAL at 22:10

## 2024-10-11 RX ADMIN — Medication 100 MILLIGRAM(S): at 13:58

## 2024-10-11 RX ADMIN — Medication 5000 UNIT(S): at 07:26

## 2024-10-11 RX ADMIN — Medication 225 MILLIGRAM(S): at 13:45

## 2024-10-11 RX ADMIN — Medication 10 MILLIGRAM(S): at 22:10

## 2024-10-11 RX ADMIN — Medication 81 MILLIGRAM(S): at 13:46

## 2024-10-11 RX ADMIN — Medication 37.5 MICROGRAM(S): at 07:27

## 2024-10-11 RX ADMIN — Medication 20 MILLIGRAM(S): at 17:57

## 2024-10-11 RX ADMIN — GABAPENTIN 400 MILLIGRAM(S): 800 TABLET, FILM COATED ORAL at 22:09

## 2024-10-11 RX ADMIN — Medication 1: at 23:28

## 2024-10-11 RX ADMIN — Medication 5000 UNIT(S): at 22:10

## 2024-10-11 NOTE — PROGRESS NOTE ADULT - SUBJECTIVE AND OBJECTIVE BOX
[facility] Department of Hospital Medicine  Eladia Cid MD  Available on MS Teams  Pager: [phone]    Patient is a 83y old  Female who presents with a chief complaint of Recurrent Falls, UTI (10 Oct 2024 15:57)    OVERNIGHT EVENTS: No acute events overnight.    SUBJECTIVE: Pt seen and examined at bedside this morning. Resting comfortably. Denies any acute complaints. Denies any nausea, vomiting or CP/SOB.    ADDITIONAL REVIEW OF SYSTEMS: as above.    MEDICATIONS  (STANDING):  aspirin enteric coated 81 milliGRAM(s) Oral daily  atorvastatin 40 milliGRAM(s) Oral at bedtime  busPIRone 10 milliGRAM(s) Oral three times a day  dextrose 5%. 1000 milliLiter(s) (50 mL/Hr) IV Continuous <Continuous>  dextrose 5%. 1000 milliLiter(s) (100 mL/Hr) IV Continuous <Continuous>  dextrose 50% Injectable 25 Gram(s) IV Push once  dextrose 50% Injectable 12.5 Gram(s) IV Push once  dextrose 50% Injectable 25 Gram(s) IV Push once  famotidine    Tablet 20 milliGRAM(s) Oral <User Schedule>  gabapentin 400 milliGRAM(s) Oral at bedtime  glucagon  Injectable 1 milliGRAM(s) IntraMuscular once  heparin   Injectable 5000 Unit(s) SubCutaneous every 8 hours  insulin lispro (ADMELOG) corrective regimen sliding scale   SubCutaneous at bedtime  insulin lispro (ADMELOG) corrective regimen sliding scale   SubCutaneous three times a day before meals  levothyroxine 37.5 MICROGram(s) Oral daily  losartan 25 milliGRAM(s) Oral daily  venlafaxine XR. 225 milliGRAM(s) Oral daily    MEDICATIONS  (PRN):  acetaminophen     Tablet .. 650 milliGRAM(s) Oral every 6 hours PRN Temp greater or equal to 38C (100.4F), Mild Pain (1 - 3)  dextrose Oral Gel 15 Gram(s) Oral once PRN Blood Glucose LESS THAN 70 milliGRAM(s)/deciliter  melatonin 3 milliGRAM(s) Oral at bedtime PRN Insomnia    CAPILLARY BLOOD GLUCOSE    POCT Blood Glucose.: 246 mg/dL (11 Oct 2024 12:09)  POCT Blood Glucose.: 146 mg/dL (11 Oct 2024 08:29)  POCT Blood Glucose.: 161 mg/dL (10 Oct 2024 21:24)  POCT Blood Glucose.: 181 mg/dL (10 Oct 2024 17:28)    I&O's Summary    10 Oct 2024 07:01  -  11 Oct 2024 07:00  --------------------------------------------------------  IN: 1000 mL / OUT: 1800 mL / NET: -800 mL    PHYSICAL EXAM:  Vital Signs Last 24 Hrs  T(C): 36.7 (11 Oct 2024 10:00), Max: 37.1 (10 Oct 2024 14:33)  T(F): 98 (11 Oct 2024 10:00), Max: 98.8 (10 Oct 2024 14:33)  HR: 80 (11 Oct 2024 10:00) (75 - 85)  BP: 108/53 (11 Oct 2024 10:00) (100/57 - 120/62)  BP(mean): --  RR: 18 (11 Oct 2024 10:00) (17 - 18)  SpO2: 98% (11 Oct 2024 10:00) (95% - 99%)    Parameters below as of 11 Oct 2024 10:00  Patient On (Oxygen Delivery Method): room air    CONSTITUTIONAL: NAD, well-developed  HEAD: Normocephalic, atraumatic  EYES: EOMI, PERRL  ENT: no rhinorrhea, no pharyngeal erythema  RESPIRATORY: No increased work of breathing, CTAB, no wheezes or crackles appreciated  CARDIOVASCULAR: RRR, S1 and S2 present, no m/r/g  ABDOMEN: soft, NT, ND, bowel sounds present  EXTREMITIES: trace b/l LE edema  MUSCULOSKELETAL: no joint swelling, no tenderness to palpation  NEURO: moving all extremities    LABS:                        11.2   14.86 )-----------( 240      ( 11 Oct 2024 05:58 )             32.0     10-11    136  |  102  |  10  ----------------------------<  115[H]  4.0   |  24  |  0.49[L]    Ca    8.1[L]      11 Oct 2024 05:58  Mg     1.70     10-11    TPro  5.9[L]  /  Alb  3.9  /  TBili  1.7[H]  /  DBili  x   /  AST  14  /  ALT  11  /  AlkPhos  35[L]  10-11    Urinalysis Basic - ( 11 Oct 2024 05:58 )    Color: x / Appearance: x / SG: x / pH: x  Gluc: 115 mg/dL / Ketone: x  / Bili: x / Urobili: x   Blood: x / Protein: x / Nitrite: x   Leuk Esterase: x / RBC: x / WBC x   Sq Epi: x / Non Sq Epi: x / Bacteria: x    Culture - Urine (collected 09 Oct 2024 15:30)  Source: Clean Catch Clean Catch (Midstream)  Final Report (11 Oct 2024 03:32):    No growth    RADIOLOGY & ADDITIONAL TESTS:    Results Reviewed:   Imaging Personally Reviewed:  Electrocardiogram Personally Reviewed:    COORDINATION OF CARE:  Care Discussed with Consultants/Other Providers [Y/N]:  Prior or Outpatient Records Reviewed [Y/N]:

## 2024-10-12 ENCOUNTER — TRANSCRIPTION ENCOUNTER (OUTPATIENT)
Age: 84
End: 2024-10-12

## 2024-10-12 LAB
GLUCOSE BLDC GLUCOMTR-MCNC: 168 MG/DL — HIGH (ref 70–99)
GLUCOSE BLDC GLUCOMTR-MCNC: 181 MG/DL — HIGH (ref 70–99)
GLUCOSE BLDC GLUCOMTR-MCNC: 202 MG/DL — HIGH (ref 70–99)
GLUCOSE BLDC GLUCOMTR-MCNC: 212 MG/DL — HIGH (ref 70–99)

## 2024-10-12 PROCEDURE — 99232 SBSQ HOSP IP/OBS MODERATE 35: CPT

## 2024-10-12 RX ADMIN — Medication 5000 UNIT(S): at 21:35

## 2024-10-12 RX ADMIN — Medication 225 MILLIGRAM(S): at 12:29

## 2024-10-12 RX ADMIN — Medication 10 MILLIGRAM(S): at 13:50

## 2024-10-12 RX ADMIN — Medication 5000 UNIT(S): at 07:22

## 2024-10-12 RX ADMIN — Medication 10 MILLIGRAM(S): at 07:21

## 2024-10-12 RX ADMIN — Medication 2: at 08:52

## 2024-10-12 RX ADMIN — Medication 1: at 17:50

## 2024-10-12 RX ADMIN — Medication 10 MILLIGRAM(S): at 21:35

## 2024-10-12 RX ADMIN — Medication 20 MILLIGRAM(S): at 17:49

## 2024-10-12 RX ADMIN — LOSARTAN POTASSIUM 25 MILLIGRAM(S): 100 TABLET, FILM COATED ORAL at 07:22

## 2024-10-12 RX ADMIN — Medication 81 MILLIGRAM(S): at 12:30

## 2024-10-12 RX ADMIN — ATORVASTATIN CALCIUM 40 MILLIGRAM(S): 10 TABLET, FILM COATED ORAL at 21:35

## 2024-10-12 RX ADMIN — Medication 5000 UNIT(S): at 13:50

## 2024-10-12 RX ADMIN — GABAPENTIN 400 MILLIGRAM(S): 800 TABLET, FILM COATED ORAL at 21:35

## 2024-10-12 RX ADMIN — Medication 37.5 MICROGRAM(S): at 07:22

## 2024-10-12 RX ADMIN — Medication 2: at 12:30

## 2024-10-12 NOTE — DISCHARGE NOTE PROVIDER - CARE PROVIDER_API CALL
Jose Maria Diehl.  Internal Medicine  [address] [place] [address]  Phone: [phone]  Fax: [phone]  Established Patient  Follow Up Time: 1 month

## 2024-10-12 NOTE — DISCHARGE NOTE PROVIDER - NSDCCPCAREPLAN_GEN_ALL_CORE_FT
PRINCIPAL DISCHARGE DIAGNOSIS  Diagnosis: Urinary tract infection  Assessment and Plan of Treatment: In the hospital you were found to have a urinary tract infection. You were treated with IV antibiotics and your symptoms resolved. Please continue to drink plenty of fluids and remain hydrated. If you experience burning with urination, fevers, or notice any blood in your urine, please contact your physician or return to the hospital for further evaluation.      SECONDARY DISCHARGE DIAGNOSES  Diagnosis: Recurrent falls  Assessment and Plan of Treatment: You were noted to have several episodes of falls at home. These falls likely occurred due to a combination of muscle weakness and possibly due to your urine infection. You were seen by our physical therapists and they recommended that you go to a rehabilitation facility. Continue to work with your physical therapists to get stronger.    Diagnosis: CVA (cerebrovascular accident)  Assessment and Plan of Treatment: In the hospital you underwent a CT scan of your head and it showed that you had an old infarct (stroke). You were evaluated by our neurologists and they recommended that you take aspirin daily and a cholesterol lowering medication to help prevent future strokes. Please follow up with a neurologist after discharge from the hospital.

## 2024-10-12 NOTE — DISCHARGE NOTE PROVIDER - NSDCCPTREATMENT_GEN_ALL_CORE_FT
PRINCIPAL PROCEDURE  Procedure: CT head  Findings and Treatment: FINDINGS:  VENTRICLES AND SULCI: Involutional changes.  INTRA-AXIAL: There is an old right basal ganglia infarct, stable No mass   effect, acute hemorrhage, or midline shift.  There are periventricular   and subcortical white matter hypodensities, consistent with microvascular type changes.  EXTRA-AXIAL: No mass or fluid collection. Basal cisterns are normal in appearance.  VISUALIZED SINUSES:  Clear.  TYMPANOMASTOID CAVITIES:  Clear.  VISUALIZED ORBITS: Normal.  CALVARIUM: Intact.  MISCELLANEOUS: None.  IMPRESSION:  No acute intracranial hemorrhage, mass effect, or midline shift.  < from: CT Head No Cont (10.09.24 @ 01:30) >

## 2024-10-12 NOTE — DISCHARGE NOTE PROVIDER - CARE PROVIDERS DIRECT ADDRESSES
[email or web address]

## 2024-10-12 NOTE — PROGRESS NOTE ADULT - SUBJECTIVE AND OBJECTIVE BOX
[facility] Department of Hospital Medicine  Eladia Cid MD  Available on MS Teams  Pager: [phone]    Patient is a 83y old  Female who presents with a chief complaint of Recurrent Falls, UTI (11 Oct 2024 14:29)    OVERNIGHT EVENTS: No acute events overnight.    SUBJECTIVE: Pt seen and examined at bedside this morning. Resting comfortably. Denies any acute complaints today. Denies any dysuria or hematuria.    ADDITIONAL REVIEW OF SYSTEMS: as above.    MEDICATIONS  (STANDING):  aspirin enteric coated 81 milliGRAM(s) Oral daily  atorvastatin 40 milliGRAM(s) Oral at bedtime  busPIRone 10 milliGRAM(s) Oral three times a day  dextrose 5%. 1000 milliLiter(s) (50 mL/Hr) IV Continuous <Continuous>  dextrose 5%. 1000 milliLiter(s) (100 mL/Hr) IV Continuous <Continuous>  dextrose 50% Injectable 25 Gram(s) IV Push once  dextrose 50% Injectable 25 Gram(s) IV Push once  dextrose 50% Injectable 12.5 Gram(s) IV Push once  famotidine    Tablet 20 milliGRAM(s) Oral <User Schedule>  gabapentin 400 milliGRAM(s) Oral at bedtime  glucagon  Injectable 1 milliGRAM(s) IntraMuscular once  heparin   Injectable 5000 Unit(s) SubCutaneous every 8 hours  insulin lispro (ADMELOG) corrective regimen sliding scale   SubCutaneous three times a day before meals  insulin lispro (ADMELOG) corrective regimen sliding scale   SubCutaneous at bedtime  levothyroxine 37.5 MICROGram(s) Oral daily  losartan 25 milliGRAM(s) Oral daily  venlafaxine XR. 225 milliGRAM(s) Oral daily    MEDICATIONS  (PRN):  acetaminophen     Tablet .. 650 milliGRAM(s) Oral every 6 hours PRN Temp greater or equal to 38C (100.4F), Mild Pain (1 - 3)  dextrose Oral Gel 15 Gram(s) Oral once PRN Blood Glucose LESS THAN 70 milliGRAM(s)/deciliter  melatonin 3 milliGRAM(s) Oral at bedtime PRN Insomnia    CAPILLARY BLOOD GLUCOSE    POCT Blood Glucose.: 202 mg/dL (12 Oct 2024 12:11)  POCT Blood Glucose.: 212 mg/dL (12 Oct 2024 08:23)  POCT Blood Glucose.: 256 mg/dL (11 Oct 2024 22:49)  POCT Blood Glucose.: 135 mg/dL (11 Oct 2024 17:14)    I&O's Summary    11 Oct 2024 07:01  -  12 Oct 2024 07:00  --------------------------------------------------------  IN: 500 mL / OUT: 1050 mL / NET: -550 mL    12 Oct 2024 07:01  -  12 Oct 2024 16:04  --------------------------------------------------------  IN: 320 mL / OUT: 500 mL / NET: -180 mL    PHYSICAL EXAM:  Vital Signs Last 24 Hrs  T(C): 36.4 (12 Oct 2024 10:00), Max: 37 (11 Oct 2024 22:55)  T(F): 97.6 (12 Oct 2024 10:00), Max: 98.6 (11 Oct 2024 22:55)  HR: 70 (12 Oct 2024 10:00) (70 - 80)  BP: 125/62 (12 Oct 2024 10:00) (104/58 - 130/83)  BP(mean): --  RR: 17 (12 Oct 2024 10:00) (17 - 18)  SpO2: 97% (12 Oct 2024 10:00) (96% - 98%)    Parameters below as of 12 Oct 2024 10:00  Patient On (Oxygen Delivery Method): room air    CONSTITUTIONAL: NAD, well-developed  HEAD: Normocephalic, atraumatic  EYES: EOMI, PERRL  ENT: no rhinorrhea, no pharyngeal erythema  RESPIRATORY: No increased work of breathing, CTAB, no wheezes or crackles appreciated  CARDIOVASCULAR: RRR, S1 and S2 present, no m/r/g  ABDOMEN: soft, NT, ND, bowel sounds present  EXTREMITIES: No LE edema  MUSCULOSKELETAL: no joint swelling, no tenderness to palpation  NEURO: A&Ox3, moving all extremities    LABS:                        13.2   8.30  )-----------( 263      ( 11 Oct 2024 15:51 )             41.6     10-11    140  |  103  |  10  ----------------------------<  196[H]  4.7   |  26  |  1.04    Ca    9.6      11 Oct 2024 15:51  Mg     1.70     10-11    TPro  7.2  /  Alb  3.9  /  TBili  0.3  /  DBili  x   /  AST  16  /  ALT  9   /  AlkPhos  61  10-11    Urinalysis Basic - ( 11 Oct 2024 15:51 )    Color: x / Appearance: x / SG: x / pH: x  Gluc: 196 mg/dL / Ketone: x  / Bili: x / Urobili: x   Blood: x / Protein: x / Nitrite: x   Leuk Esterase: x / RBC: x / WBC x   Sq Epi: x / Non Sq Epi: x / Bacteria: x    RADIOLOGY & ADDITIONAL TESTS:    Results Reviewed:   Imaging Personally Reviewed:  Electrocardiogram Personally Reviewed:    COORDINATION OF CARE:  Care Discussed with Consultants/Other Providers [Y/N]:  Prior or Outpatient Records Reviewed [Y/N]:

## 2024-10-12 NOTE — DISCHARGE NOTE PROVIDER - HOSPITAL COURSE
Briefly, this is an 82 y/o F with PMH of NIDDMT2, GERD, HTN, HLD, hypothyroidism, anxiety/depression, IBS who presented from home at request of OP physical therapist after pt noted to have recurrent falls, more unsteadiness in her gait with her legs buckling. Most recently fell 2 weeks ago, backwards, +headstrike but LOC. 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 Admitted for further workup and evaluation.     Patient seen by neurology. As patient with old infarct, recommended for ASA/Statin and no further workup recommended. Patient seen by ID and completed 3 day course of CTX while admitted with resolution of symptoms. She was seen by physical therapy and recommended for ASHLEE.

## 2024-10-12 NOTE — DISCHARGE NOTE PROVIDER - NSDCMRMEDTOKEN_GEN_ALL_CORE_FT
atorvastatin 40 mg oral tablet: 1 tab(s) orally once a day (at bedtime)  busPIRone 10 mg oral tablet: 1 tab(s) orally 3 times a day  Effexor  mg oral capsule, extended release: 1 cap(s) orally once a day ..(total 225mg)  Effexor XR 75 mg oral capsule, extended release: 1 cap(s) orally once a day ...(total 225mg)  famotidine 20 mg oral tablet: 1 tab(s) orally once a day  gabapentin 400 mg oral capsule: 1 cap(s) orally once a day (at bedtime)  Januvia 100 mg oral tablet: 1 tab(s) orally once a day  Jardiance 25 mg oral tablet: 1 tab(s) orally once a day  levothyroxine 25 mcg (0.025 mg) oral tablet: 1.5 tab(s) orally once a day  losartan 25 mg oral tablet: 1 tab(s) orally once a day  metFORMIN 1000 mg oral tablet: 1 tab(s) orally 2 times a day        aspirin 81 mg oral delayed release tablet: 1 tab(s) orally once a day  busPIRone 10 mg oral tablet: 1 tab(s) orally 3 times a day  Effexor  mg oral capsule, extended release: 1 cap(s) orally once a day ..(total 225mg)  Effexor XR 75 mg oral capsule, extended release: 1 cap(s) orally once a day ...(total 225mg)  famotidine 20 mg oral tablet: 1 tab(s) orally once a day  gabapentin 400 mg oral capsule: 1 cap(s) orally once a day (at bedtime)  Januvia 100 mg oral tablet: 1 tab(s) orally once a day  Jardiance 25 mg oral tablet: 1 tab(s) orally once a day  levothyroxine 25 mcg (0.025 mg) oral tablet: 1.5 tab(s) orally once a day  losartan 25 mg oral tablet: 1 tab(s) orally once a day  melatonin 3 mg oral tablet: 1 tab(s) orally once a day (at bedtime) As needed Insomnia  metFORMIN 1000 mg oral tablet: 1 tab(s) orally 2 times a day       polyethylene glycol 3350 oral powder for reconstitution: 17 gram(s) orally once a day  senna leaf extract oral tablet: 2 tab(s) orally once a day (at bedtime)

## 2024-10-12 NOTE — DISCHARGE NOTE PROVIDER - NSDCFUSCHEDAPPT_GEN_ALL_CORE_FT
[facility]  [facility] OP [id number] [address]  Scheduled Appointment: 10/16/2024

## 2024-10-13 LAB
GLUCOSE BLDC GLUCOMTR-MCNC: 135 MG/DL — HIGH (ref 70–99)
GLUCOSE BLDC GLUCOMTR-MCNC: 172 MG/DL — HIGH (ref 70–99)
GLUCOSE BLDC GLUCOMTR-MCNC: 222 MG/DL — HIGH (ref 70–99)
GLUCOSE BLDC GLUCOMTR-MCNC: 225 MG/DL — HIGH (ref 70–99)

## 2024-10-13 PROCEDURE — 99232 SBSQ HOSP IP/OBS MODERATE 35: CPT

## 2024-10-13 RX ORDER — SENNOSIDES 8.6 MG
2 TABLET ORAL AT BEDTIME
Refills: 0 | Status: DISCONTINUED | OUTPATIENT
Start: 2024-10-13 | End: 2024-10-14

## 2024-10-13 RX ADMIN — Medication 225 MILLIGRAM(S): at 11:49

## 2024-10-13 RX ADMIN — Medication 10 MILLIGRAM(S): at 13:08

## 2024-10-13 RX ADMIN — Medication 81 MILLIGRAM(S): at 11:48

## 2024-10-13 RX ADMIN — Medication 5000 UNIT(S): at 06:36

## 2024-10-13 RX ADMIN — Medication 2: at 12:18

## 2024-10-13 RX ADMIN — Medication 1: at 17:29

## 2024-10-13 RX ADMIN — ATORVASTATIN CALCIUM 40 MILLIGRAM(S): 10 TABLET, FILM COATED ORAL at 21:59

## 2024-10-13 RX ADMIN — LOSARTAN POTASSIUM 25 MILLIGRAM(S): 100 TABLET, FILM COATED ORAL at 06:36

## 2024-10-13 RX ADMIN — Medication 5000 UNIT(S): at 22:00

## 2024-10-13 RX ADMIN — Medication 10 MILLIGRAM(S): at 22:00

## 2024-10-13 RX ADMIN — GABAPENTIN 400 MILLIGRAM(S): 800 TABLET, FILM COATED ORAL at 21:59

## 2024-10-13 RX ADMIN — Medication 37.5 MICROGRAM(S): at 06:36

## 2024-10-13 RX ADMIN — Medication 5000 UNIT(S): at 13:08

## 2024-10-13 RX ADMIN — Medication 20 MILLIGRAM(S): at 17:30

## 2024-10-13 RX ADMIN — Medication 10 MILLIGRAM(S): at 06:36

## 2024-10-13 NOTE — PROGRESS NOTE ADULT - SUBJECTIVE AND OBJECTIVE BOX
[facility] Department of Hospital Medicine  Eladia Cid MD  Available on MS Teams  Pager: [phone]    Patient is a 83y old  Female who presents with a chief complaint of Recurrent Falls, UTI (12 Oct 2024 20:05)    OVERNIGHT EVENTS: No acute events overnight.    SUBJECTIVE: Pt seen and examined at bedside this morning. Reports feeling well. States that she feels tired. Would like to get more sleep. Denies any acute complaints.     ADDITIONAL REVIEW OF SYSTEMS: as above.    MEDICATIONS  (STANDING):  aspirin enteric coated 81 milliGRAM(s) Oral daily  atorvastatin 40 milliGRAM(s) Oral at bedtime  busPIRone 10 milliGRAM(s) Oral three times a day  dextrose 5%. 1000 milliLiter(s) (50 mL/Hr) IV Continuous <Continuous>  dextrose 5%. 1000 milliLiter(s) (100 mL/Hr) IV Continuous <Continuous>  dextrose 50% Injectable 25 Gram(s) IV Push once  dextrose 50% Injectable 25 Gram(s) IV Push once  dextrose 50% Injectable 12.5 Gram(s) IV Push once  famotidine    Tablet 20 milliGRAM(s) Oral <User Schedule>  gabapentin 400 milliGRAM(s) Oral at bedtime  glucagon  Injectable 1 milliGRAM(s) IntraMuscular once  heparin   Injectable 5000 Unit(s) SubCutaneous every 8 hours  insulin lispro (ADMELOG) corrective regimen sliding scale   SubCutaneous at bedtime  insulin lispro (ADMELOG) corrective regimen sliding scale   SubCutaneous three times a day before meals  levothyroxine 37.5 MICROGram(s) Oral daily  losartan 25 milliGRAM(s) Oral daily  polyethylene glycol 3350 17 Gram(s) Oral daily  senna 2 Tablet(s) Oral at bedtime  venlafaxine XR. 225 milliGRAM(s) Oral daily    MEDICATIONS  (PRN):  acetaminophen     Tablet .. 650 milliGRAM(s) Oral every 6 hours PRN Temp greater or equal to 38C (100.4F), Mild Pain (1 - 3)  dextrose Oral Gel 15 Gram(s) Oral once PRN Blood Glucose LESS THAN 70 milliGRAM(s)/deciliter  melatonin 3 milliGRAM(s) Oral at bedtime PRN Insomnia    CAPILLARY BLOOD GLUCOSE    POCT Blood Glucose.: 172 mg/dL (13 Oct 2024 17:11)  POCT Blood Glucose.: 225 mg/dL (13 Oct 2024 12:00)  POCT Blood Glucose.: 135 mg/dL (13 Oct 2024 08:28)  POCT Blood Glucose.: 168 mg/dL (12 Oct 2024 21:50)    I&O's Summary    12 Oct 2024 07:01  -  13 Oct 2024 07:00  --------------------------------------------------------  IN: 830 mL / OUT: 1400 mL / NET: -570 mL    13 Oct 2024 07:01  -  13 Oct 2024 17:48  --------------------------------------------------------  IN: 550 mL / OUT: 1350 mL / NET: -800 mL    PHYSICAL EXAM:  Vital Signs Last 24 Hrs  T(C): 36.8 (13 Oct 2024 17:37), Max: 36.8 (13 Oct 2024 06:00)  T(F): 98.2 (13 Oct 2024 17:37), Max: 98.3 (13 Oct 2024 13:42)  HR: 82 (13 Oct 2024 17:37) (71 - 82)  BP: 118/58 (13 Oct 2024 17:37) (118/58 - 141/66)  BP(mean): --  RR: 18 (13 Oct 2024 17:37) (17 - 18)  SpO2: 96% (13 Oct 2024 17:37) (96% - 100%)    Parameters below as of 13 Oct 2024 17:37  Patient On (Oxygen Delivery Method): room air    CONSTITUTIONAL: NAD, well-developed  HEAD: Normocephalic, atraumatic  EYES: EOMI, PERRL  ENT: no rhinorrhea, no pharyngeal erythema  RESPIRATORY: No increased work of breathing, CTAB, no wheezes or crackles appreciated  CARDIOVASCULAR: RRR, S1 and S2 present, no m/r/g  ABDOMEN: soft, NT, ND, bowel sounds present  EXTREMITIES: No LE edema  MUSCULOSKELETAL: no joint swelling, no tenderness to palpation  NEURO: moving all extremities    LABS:    RADIOLOGY & ADDITIONAL TESTS:    Results Reviewed:   Imaging Personally Reviewed:  Electrocardiogram Personally Reviewed:    COORDINATION OF CARE:  Care Discussed with Consultants/Other Providers [Y/N]:  Prior or Outpatient Records Reviewed [Y/N]:

## 2024-10-14 ENCOUNTER — TRANSCRIPTION ENCOUNTER (OUTPATIENT)
Age: 84
End: 2024-10-14

## 2024-10-14 VITALS
OXYGEN SATURATION: 95 % | TEMPERATURE: 97 F | HEART RATE: 89 BPM | DIASTOLIC BLOOD PRESSURE: 71 MMHG | SYSTOLIC BLOOD PRESSURE: 128 MMHG | RESPIRATION RATE: 17 BRPM

## 2024-10-14 LAB
ANION GAP SERPL CALC-SCNC: 12 MMOL/L — SIGNIFICANT CHANGE UP (ref 7–14)
BUN SERPL-MCNC: 16 MG/DL — SIGNIFICANT CHANGE UP (ref 7–23)
CALCIUM SERPL-MCNC: 9.3 MG/DL — SIGNIFICANT CHANGE UP (ref 8.4–10.5)
CHLORIDE SERPL-SCNC: 104 MMOL/L — SIGNIFICANT CHANGE UP (ref 98–107)
CO2 SERPL-SCNC: 25 MMOL/L — SIGNIFICANT CHANGE UP (ref 22–31)
CREAT SERPL-MCNC: 0.96 MG/DL — SIGNIFICANT CHANGE UP (ref 0.5–1.3)
EGFR: 59 ML/MIN/1.73M2 — LOW
GLUCOSE BLDC GLUCOMTR-MCNC: 148 MG/DL — HIGH (ref 70–99)
GLUCOSE BLDC GLUCOMTR-MCNC: 218 MG/DL — HIGH (ref 70–99)
GLUCOSE SERPL-MCNC: 145 MG/DL — HIGH (ref 70–99)
HCT VFR BLD CALC: 40 % — SIGNIFICANT CHANGE UP (ref 34.5–45)
HGB BLD-MCNC: 12.6 G/DL — SIGNIFICANT CHANGE UP (ref 11.5–15.5)
MAGNESIUM SERPL-MCNC: 1.6 MG/DL — SIGNIFICANT CHANGE UP (ref 1.6–2.6)
MCHC RBC-ENTMCNC: 26.3 PG — LOW (ref 27–34)
MCHC RBC-ENTMCNC: 31.5 GM/DL — LOW (ref 32–36)
MCV RBC AUTO: 83.5 FL — SIGNIFICANT CHANGE UP (ref 80–100)
NRBC # BLD: 0 /100 WBCS — SIGNIFICANT CHANGE UP (ref 0–0)
NRBC # FLD: 0 K/UL — SIGNIFICANT CHANGE UP (ref 0–0)
PHOSPHATE SERPL-MCNC: 2.8 MG/DL — SIGNIFICANT CHANGE UP (ref 2.5–4.5)
PLATELET # BLD AUTO: 276 K/UL — SIGNIFICANT CHANGE UP (ref 150–400)
POTASSIUM SERPL-MCNC: 3.9 MMOL/L — SIGNIFICANT CHANGE UP (ref 3.5–5.3)
POTASSIUM SERPL-SCNC: 3.9 MMOL/L — SIGNIFICANT CHANGE UP (ref 3.5–5.3)
RBC # BLD: 4.79 M/UL — SIGNIFICANT CHANGE UP (ref 3.8–5.2)
RBC # FLD: 14.6 % — HIGH (ref 10.3–14.5)
SODIUM SERPL-SCNC: 141 MMOL/L — SIGNIFICANT CHANGE UP (ref 135–145)
WBC # BLD: 7.26 K/UL — SIGNIFICANT CHANGE UP (ref 3.8–10.5)
WBC # FLD AUTO: 7.26 K/UL — SIGNIFICANT CHANGE UP (ref 3.8–10.5)

## 2024-10-14 PROCEDURE — 99232 SBSQ HOSP IP/OBS MODERATE 35: CPT

## 2024-10-14 RX ORDER — ASPIRIN 325 MG
1 TABLET ORAL
Qty: 0 | Refills: 0 | DISCHARGE
Start: 2024-10-14

## 2024-10-14 RX ORDER — SENNOSIDES 8.6 MG
2 TABLET ORAL
Qty: 0 | Refills: 0 | DISCHARGE
Start: 2024-10-14

## 2024-10-14 RX ORDER — ATORVASTATIN CALCIUM 10 MG/1
1 TABLET, FILM COATED ORAL
Refills: 0 | DISCHARGE

## 2024-10-14 RX ORDER — 5-HYDROXYTRYPTOPHAN (5-HTP) 100 MG
1 TABLET,DISINTEGRATING ORAL
Qty: 0 | Refills: 0 | DISCHARGE
Start: 2024-10-14

## 2024-10-14 RX ADMIN — Medication 10 MILLIGRAM(S): at 13:38

## 2024-10-14 RX ADMIN — Medication 81 MILLIGRAM(S): at 12:36

## 2024-10-14 RX ADMIN — Medication 2: at 12:41

## 2024-10-14 RX ADMIN — Medication 37.5 MICROGRAM(S): at 06:51

## 2024-10-14 RX ADMIN — Medication 225 MILLIGRAM(S): at 12:35

## 2024-10-14 RX ADMIN — Medication 10 MILLIGRAM(S): at 06:51

## 2024-10-14 RX ADMIN — Medication 5000 UNIT(S): at 13:38

## 2024-10-14 RX ADMIN — Medication 17 GRAM(S): at 12:35

## 2024-10-14 RX ADMIN — Medication 20 MILLIGRAM(S): at 16:44

## 2024-10-14 NOTE — PROGRESS NOTE ADULT - PROBLEM SELECTOR PROBLEM 4
KINGSLEY (acute kidney injury)
KINGSLEY (acute kidney injury)
HTN (hypertension)
KINGSLEY (acute kidney injury)
KINGSLEY (acute kidney injury)

## 2024-10-14 NOTE — PROGRESS NOTE ADULT - PROBLEM SELECTOR PLAN 8
- c/w pepcid 20mg qhs, renally dose based on EGFR
#Hypothyroidism  - TSH elevated in 8s, with acute infection  - c/w PTA dose of 37.5mcg levothyroxine  - OP recheck for dose adjustment after infection resolution    #Anxiety/depression  - c/w PTA buspar, effexor

## 2024-10-14 NOTE — PROGRESS NOTE ADULT - REASON FOR ADMISSION
Recurrent Falls, UTI

## 2024-10-14 NOTE — DISCHARGE NOTE NURSING/CASE MANAGEMENT/SOCIAL WORK - PATIENT PORTAL LINK FT
You can access the FollowMyHealth Patient Portal offered by [facility] by registering at the following website: [email or web address]. By joining [employer]’s FollowMyHealth portal, you will also be able to view your health information using other applications (apps) compatible with our system.

## 2024-10-14 NOTE — PROGRESS NOTE ADULT - PROBLEM SELECTOR PLAN 3
- Cr 1.7 on admission, previously in 2023 wnl  - repeat Cr improved to 1.23 on its own, suspect from pre renal etiology given improvement in short time  - given improvement, favor c/w pta losartan for now  - renally dose meds, avoid nephrotoxic agents  - Cr now WNL
Patient noted with slight elevated in bilirubin to 1.7 on 10/11  - unclear etiology  - RUQ sono obtained without any biliary or GB pathology  - repeat CMP, with normal LFTs  - now resolved
Patient noted with slight elevated in bilirubin to 1.7 on 10/11  - unclear etiology  - RUQ sono obtained without any biliary or GB pathology  - will check repeat CMP  - if persistently elevated will check hepatitis panel
Patient noted with slight elevated in bilirubin to 1.7 on 10/11  - unclear etiology  - RUQ sono obtained without any biliary or GB pathology  - repeat CMP, with normal LFTs  - now resolved
Patient noted with slight elevated in bilirubin to 1.7 on 10/11  - unclear etiology  - RUQ sono obtained without any biliary or GB pathology  - repeat CMP, with normal LFTs  - now resolved

## 2024-10-14 NOTE — PROGRESS NOTE ADULT - PROBLEM SELECTOR PROBLEM 3
Elevated liver enzymes
KINGSLEY (acute kidney injury)

## 2024-10-14 NOTE — PROGRESS NOTE ADULT - PROBLEM SELECTOR PLAN 2
- UA notable for +LE, WBC, +yeast, and glucose  - on Jardiance at home for DM  - sp CTX and Diflucan in the ED  - patient notes intermittent dysuria  - ID yimi cohn, rec CTX x3 days  - urine cultures obtained 10/9, NGTD, although possible that cultures may have been obtained after antibiotics administered
- UA notable for +LE, WBC, +yeast, and glucose  - on Jardiance at home for DM  - sp CTX and Diflucan in the ED  - patient notes intermittent dysuria  - ID yimi appreciated, rec CTX x3 days  - urine cultures obtained 10/9, results pending
- UA notable for +LE, WBC, +yeast, and glucose  - on Jardiance at home for DM  - sp CTX and Diflucan in the ED  - patient notes intermittent dysuria  - urine cultures obtained 10/9, NGTD, although possible that cultures may have been obtained after antibiotics administered  - ID eval appreciated, now s/p CTX x3 days

## 2024-10-14 NOTE — PROGRESS NOTE ADULT - PROBLEM SELECTOR PROBLEM 7
GERD (gastroesophageal reflux disease)
Diabetes mellitus

## 2024-10-14 NOTE — PROGRESS NOTE ADULT - PROBLEM SELECTOR PLAN 6
- c/w PTA lipitor
- hold PTA meds and s/w ISS  - check Hba1c
- c/w PTA lipitor

## 2024-10-14 NOTE — PROGRESS NOTE ADULT - PROVIDER SPECIALTY LIST ADULT
Infectious Disease
Hospitalist
Opioid Counseling: I discussed with the patient the potential side effects of opioids including but not limited to addiction, altered mental status, and depression. I stressed avoiding alcohol, benzodiazepines, muscle relaxants and sleep aids unless specifically okayed by a physician. The patient verbalized understanding of the proper use and possible adverse effects of opioids. All of the patient's questions and concerns were addressed. They were instructed to flush the remaining pills down the toilet if they did not need them for pain.

## 2024-10-14 NOTE — DISCHARGE NOTE NURSING/CASE MANAGEMENT/SOCIAL WORK - NSDCPEFALRISK_GEN_ALL_CORE
For information on Fall & Injury Prevention, visit: [email or web address] OR  [email or web address] OR  https://www.cdc.gov/steadi/patient.html

## 2024-10-14 NOTE — PROGRESS NOTE ADULT - PROBLEM SELECTOR PLAN 4
- Cr 1.7 on admission, previously in 2023 wnl  - repeat Cr improved to 1.23 on its own, suspect from pre renal etiology given improvement in short time  - given improvement, favor c/w pta losartan for now  - renally dose meds, avoid nephrotoxic agents  - Cr now WNL
- c/w home losartan

## 2024-10-14 NOTE — PROGRESS NOTE ADULT - SUBJECTIVE AND OBJECTIVE BOX
Patient is a 83y old  Female who presents with a chief complaint of Recurrent Falls, UTI (13 Oct 2024 17:47)      SUBJECTIVE / OVERNIGHT EVENTS:    MEDICATIONS  (STANDING):  aspirin enteric coated 81 milliGRAM(s) Oral daily  atorvastatin 40 milliGRAM(s) Oral at bedtime  busPIRone 10 milliGRAM(s) Oral three times a day  dextrose 5%. 1000 milliLiter(s) (50 mL/Hr) IV Continuous <Continuous>  dextrose 5%. 1000 milliLiter(s) (100 mL/Hr) IV Continuous <Continuous>  dextrose 50% Injectable 25 Gram(s) IV Push once  dextrose 50% Injectable 25 Gram(s) IV Push once  dextrose 50% Injectable 12.5 Gram(s) IV Push once  famotidine    Tablet 20 milliGRAM(s) Oral <User Schedule>  gabapentin 400 milliGRAM(s) Oral at bedtime  glucagon  Injectable 1 milliGRAM(s) IntraMuscular once  heparin   Injectable 5000 Unit(s) SubCutaneous every 8 hours  insulin lispro (ADMELOG) corrective regimen sliding scale   SubCutaneous at bedtime  insulin lispro (ADMELOG) corrective regimen sliding scale   SubCutaneous three times a day before meals  levothyroxine 37.5 MICROGram(s) Oral daily  losartan 25 milliGRAM(s) Oral daily  polyethylene glycol 3350 17 Gram(s) Oral daily  senna 2 Tablet(s) Oral at bedtime  venlafaxine XR. 225 milliGRAM(s) Oral daily    MEDICATIONS  (PRN):  acetaminophen     Tablet .. 650 milliGRAM(s) Oral every 6 hours PRN Temp greater or equal to 38C (100.4F), Mild Pain (1 - 3)  dextrose Oral Gel 15 Gram(s) Oral once PRN Blood Glucose LESS THAN 70 milliGRAM(s)/deciliter  melatonin 3 milliGRAM(s) Oral at bedtime PRN Insomnia      Vital Signs Last 24 Hrs  T(C): 36.6 (14 Oct 2024 02:00), Max: 36.8 (13 Oct 2024 13:42)  T(F): 97.9 (14 Oct 2024 02:00), Max: 98.3 (13 Oct 2024 13:42)  HR: 73 (14 Oct 2024 02:00) (73 - 82)  BP: 131/61 (14 Oct 2024 02:00) (118/58 - 131/64)  BP(mean): --  RR: 18 (14 Oct 2024 02:00) (18 - 18)  SpO2: 97% (14 Oct 2024 02:00) (96% - 100%)    Parameters below as of 14 Oct 2024 02:00  Patient On (Oxygen Delivery Method): room air      CAPILLARY BLOOD GLUCOSE      POCT Blood Glucose.: 148 mg/dL (14 Oct 2024 08:25)  POCT Blood Glucose.: 222 mg/dL (13 Oct 2024 21:53)  POCT Blood Glucose.: 172 mg/dL (13 Oct 2024 17:11)  POCT Blood Glucose.: 225 mg/dL (13 Oct 2024 12:00)    I&O's Summary    13 Oct 2024 07:01  -  14 Oct 2024 07:00  --------------------------------------------------------  IN: 1200 mL / OUT: 1901 mL / NET: -701 mL        PHYSICAL EXAM:  GENERAL: NAD, well-developed  HEAD:  Atraumatic, Normocephalic  EYES: EOMI, PERRLA, conjunctiva and sclera clear  NECK: Supple, No JVD  CHEST/LUNG: Clear to auscultation bilaterally; No wheeze  HEART: Regular rate and rhythm; No murmurs, rubs, or gallops  ABDOMEN: Soft, Nontender, Nondistended; Bowel sounds present  EXTREMITIES:  2+ Peripheral Pulses, No clubbing, cyanosis, or edema  PSYCH: AAOx3  NEUROLOGY: non-focal  SKIN: No rashes or lesions    LABS:                        12.6   7.26  )-----------( 276      ( 14 Oct 2024 05:50 )             40.0     10-14    141  |  104  |  16  ----------------------------<  145[H]  3.9   |  25  |  0.96    Ca    9.3      14 Oct 2024 05:50  Phos  2.8     10-14  Mg     1.60     10-14            Urinalysis Basic - ( 14 Oct 2024 05:50 )    Color: x / Appearance: x / SG: x / pH: x  Gluc: 145 mg/dL / Ketone: x  / Bili: x / Urobili: x   Blood: x / Protein: x / Nitrite: x   Leuk Esterase: x / RBC: x / WBC x   Sq Epi: x / Non Sq Epi: x / Bacteria: x        RADIOLOGY & ADDITIONAL TESTS:    Imaging Personally Reviewed:    Consultant(s) Notes Reviewed:      Care Discussed with Consultants/Other Providers:   Patient is a 83y old  Female who presents with a chief complaint of Recurrent Falls, UTI (13 Oct 2024 17:47)      SUBJECTIVE / OVERNIGHT EVENTS: patient seen and examined by bedside, pt alert, awake, no acute distress noted, denies headache, dizziness, SOB, CP, Palpitations , N/V/D, abdominal pain        MEDICATIONS  (STANDING):  aspirin enteric coated 81 milliGRAM(s) Oral daily  atorvastatin 40 milliGRAM(s) Oral at bedtime  busPIRone 10 milliGRAM(s) Oral three times a day  dextrose 5%. 1000 milliLiter(s) (50 mL/Hr) IV Continuous <Continuous>  dextrose 5%. 1000 milliLiter(s) (100 mL/Hr) IV Continuous <Continuous>  dextrose 50% Injectable 25 Gram(s) IV Push once  dextrose 50% Injectable 25 Gram(s) IV Push once  dextrose 50% Injectable 12.5 Gram(s) IV Push once  famotidine    Tablet 20 milliGRAM(s) Oral <User Schedule>  gabapentin 400 milliGRAM(s) Oral at bedtime  glucagon  Injectable 1 milliGRAM(s) IntraMuscular once  heparin   Injectable 5000 Unit(s) SubCutaneous every 8 hours  insulin lispro (ADMELOG) corrective regimen sliding scale   SubCutaneous at bedtime  insulin lispro (ADMELOG) corrective regimen sliding scale   SubCutaneous three times a day before meals  levothyroxine 37.5 MICROGram(s) Oral daily  losartan 25 milliGRAM(s) Oral daily  polyethylene glycol 3350 17 Gram(s) Oral daily  senna 2 Tablet(s) Oral at bedtime  venlafaxine XR. 225 milliGRAM(s) Oral daily    MEDICATIONS  (PRN):  acetaminophen     Tablet .. 650 milliGRAM(s) Oral every 6 hours PRN Temp greater or equal to 38C (100.4F), Mild Pain (1 - 3)  dextrose Oral Gel 15 Gram(s) Oral once PRN Blood Glucose LESS THAN 70 milliGRAM(s)/deciliter  melatonin 3 milliGRAM(s) Oral at bedtime PRN Insomnia      Vital Signs Last 24 Hrs  T(C): 36.6 (14 Oct 2024 02:00), Max: 36.8 (13 Oct 2024 13:42)  T(F): 97.9 (14 Oct 2024 02:00), Max: 98.3 (13 Oct 2024 13:42)  HR: 73 (14 Oct 2024 02:00) (73 - 82)  BP: 131/61 (14 Oct 2024 02:00) (118/58 - 131/64)  BP(mean): --  RR: 18 (14 Oct 2024 02:00) (18 - 18)  SpO2: 97% (14 Oct 2024 02:00) (96% - 100%)    Parameters below as of 14 Oct 2024 02:00  Patient On (Oxygen Delivery Method): room air      CAPILLARY BLOOD GLUCOSE      POCT Blood Glucose.: 148 mg/dL (14 Oct 2024 08:25)  POCT Blood Glucose.: 222 mg/dL (13 Oct 2024 21:53)  POCT Blood Glucose.: 172 mg/dL (13 Oct 2024 17:11)  POCT Blood Glucose.: 225 mg/dL (13 Oct 2024 12:00)    I&O's Summary    13 Oct 2024 07:01  -  14 Oct 2024 07:00  --------------------------------------------------------  IN: 1200 mL / OUT: 1901 mL / NET: -701 mL        PHYSICAL EXAM:  GENERAL: NAD,   HEAD:  Atraumatic, Normocephalic  EYES: EOMI, PERRLA, conjunctiva and sclera clear  CHEST/LUNG: Clear to auscultation bilaterally; No wheeze  HEART: Regular rate and rhythm; No murmurs, rubs, or gallops  ABDOMEN: Soft, Nontender, Nondistended; Bowel sounds present  EXTREMITIES:  2+ Peripheral Pulses, No clubbing, cyanosis, or edema  PSYCH: AAOx3  NEUROLOGY: non-focal  SKIN: No rashes or lesions    LABS:                        12.6   7.26  )-----------( 276      ( 14 Oct 2024 05:50 )             40.0     10-14    141  |  104  |  16  ----------------------------<  145[H]  3.9   |  25  |  0.96    Ca    9.3      14 Oct 2024 05:50  Phos  2.8     10-14  Mg     1.60     10-14            Urinalysis Basic - ( 14 Oct 2024 05:50 )    Color: x / Appearance: x / SG: x / pH: x  Gluc: 145 mg/dL / Ketone: x  / Bili: x / Urobili: x   Blood: x / Protein: x / Nitrite: x   Leuk Esterase: x / RBC: x / WBC x   Sq Epi: x / Non Sq Epi: x / Bacteria: x        RADIOLOGY & ADDITIONAL TESTS:    Imaging Personally Reviewed:    Consultant(s) Notes Reviewed:      Care Discussed with Consultants/Other Providers:

## 2024-10-14 NOTE — PROGRESS NOTE ADULT - ASSESSMENT
This is a 82 y/o F w/ PMhx of HTN, HLD, DM2, hypothyroidism, who presented to [facility] on 10/9/24 for difficulty ambulating for last 3 months, falls.   In the ED, pt was afebrile, VSS. Labs: no leukocytosis, KINGSLEY to 1.73, lactate 4.7.   U/A positive 208,   CTH no acute findings, US b/l LE w/o DVT. CXR w/o consolidation     #UTI  #Frequent falls   #KINGSLEY    Overall,  82 y/o F w/ PMhx of HTN, HLD, DM2, hypothyroidism p/w difficulty ambulating, found to have positive U/A, c/f UTI, s/p 1 dose of ceftriaxone. UCx not sent. Pt with dysuria for the last few weeks, no CVA tenderness on exam, +suprapubic tenderness. Afebrile, no leukocytosis.     Recommend:   1. Ceftriaxone 1 g q24 x 3 day course   2. Would send UCx to guide therapy  3. Yeast is an infrequent cause of UTI, would not treat candida in the urine at this time     Thank you for this consult. Inpatient ID consult team will sign off.    Further changes in lab values, imaging studies, or clinical status will not be known to ID inpatient consultants unless specifically communicated by primary team.    Akshat Gaytan MD  Attending Physician  Division of Infectious Diseases  Department of Medicine    Please contact through MS Teams message.  Office: [phone] (after 5 PM or weekend)      
Ms. Yu is an 84 y/o F with PMH of NIDDMT2, GERD, HTN, HLD, hypothyroidism, anxiety/depression, IBS who presented from home at request of OP physical therapist after pt noted to have recurrent falls, more unsteadiness in her gait with her legs buckling. Most recently fell 2 weeks ago, backwards, +headstrike but LOC. 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 Admitted for further workup and evaluation. 
Ms. Yu is an 82 y/o F with PMH of NIDDMT2, GERD, HTN, HLD, hypothyroidism, anxiety/depression, IBS who presented from home at request of OP physical therapist after pt noted to have recurrent falls, more unsteadiness in her gait with her legs buckling. Most recently fell 2 weeks ago, backwards, +headstrike but LOC. 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 Admitted for further workup and evaluation. 
Ms. Yu is an 82 y/o F with PMH of NIDDMT2, GERD, HTN, HLD, hypothyroidism, anxiety/depression, IBS who presented from home at request of OP physical therapist after pt noted to have recurrent falls, more unsteadiness in her gait with her legs buckling. Most recently fell 2 weeks ago, backwards, +headstrike but LOC. 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 Admitted for further workup and evaluation.

## 2024-10-14 NOTE — PROGRESS NOTE ADULT - NSPROGADDITIONALINFOA_GEN_ALL_CORE
Pt medically ready for discharge to [facility] , awaiting auth and placement   Plan of care d/w pt and ACP

## 2024-10-14 NOTE — DISCHARGE NOTE NURSING/CASE MANAGEMENT/SOCIAL WORK - NSDCPEPTSTRK_GEN_ALL_CORE
Call 911 for stroke/Need for follow up after discharge/Prescribed medications/Risk factors for stroke/Stroke education booklet/Stroke support groups for patients, families, and friends/Stroke warning signs and symptoms/Signs and symptoms of stroke
Never smoker

## 2024-10-14 NOTE — PROGRESS NOTE ADULT - PROBLEM SELECTOR PLAN 7
- hold PTA meds and s/w ISS  - check Hba1c
- c/w pepcid 20mg qhs, renally dose based on EGFR
- hold PTA meds and s/w ISS  - check Hba1c

## 2024-10-14 NOTE — PROGRESS NOTE ADULT - PROBLEM SELECTOR PLAN 1
- worsening recurrently, noted to have more knees buckling, and difficulty with ambulation with home PT  - CTH notable for no acute changes, prior old R basal ganglia infarct noted, no prior hx of known CVA/TIA  - L knee xray and pelvic xray notable for: no acute fx/dislocations, lumbar degenerative changes with L knee tricompartment OA with slight medial tibiofemoral compartment predominance and generalized osteopenia  - R knee xray with tricompartment OA, no acute fractures  - given CTH notable for old R basal ganglia infarct and now with gait issues, neuro consulted for further input/eval; recs appreciated, rec ASA/Atorva, no further imaging warranted at this time  - also with UTI as well, may also possibly be contributing  - monitor closely, safety precautions, ambulate with assistance  - PT rec ASHLEE, placement in progress
- worsening recurrently, noted to have more knees buckling, and difficulty with ambulation with home PT  - CTH notable for no acute changes, prior old R basal ganglia infarct noted, no prior hx of known CVA/TIA  - L knee xray and pelvic xray notable for: no acute fx/dislocations, lumbar degenerative changes with L knee tricompartment OA with slight medial tibiofemoral compartment predominance and generalized osteopenia  - R knee xray with tricompartment OA, no acute fractures  - given CTH notable for old R basal ganglia infarct and now with gait issues, neuro consulted for further input/eval; recs appreciated, rec ASA/Atorva, no further imaging warranted at this time  - also with UTI as well, may also possibly be contributing  - monitor closely, safety precautions, ambulate with assistance  - PT rec ASHLEE, placement in progress
- worsening recurrently, noted to have more knees buckling, and difficulty with ambulation with home PT  - CTH notable for no acute changes, prior old R basal ganglia infarct noted, no prior hx of known CVA/TIA  - L knee xray and pelvic xray notable for: no acute fx/dislocations, lumbar degenerative changes with L knee tricompartment OA with slight medial tibiofemoral compartment predominance and generalized osteopenia  - R knee xray with tricompartment OA, no acute fractures  - PT rec ASHLEE  - given CTH notable for old R basal ganglia infarct and now with gait issues, neuro consulted for further input/eval; recs appreciated, rec ASA/Atorva, no further imaging warranted at this time  - also with UTI as well, may also possibly be contributing  - monitor closely, safety precautions, ambulate with assistance
- worsening recurrently, noted to have more knees buckling, and difficulty with ambulation with home PT  - CTH notable for no acute changes, prior old R basal ganglia infarct noted, no prior hx of known CVA/TIA  - L knee xray and pelvic xray notable for: no acute fx/dislocations, lumbar degenerative changes with L knee tricompartment OA with slight medial tibiofemoral compartment predominance and generalized osteopenia  - R knee xray with tricompartment OA, no acute fractures  - given CTH notable for old R basal ganglia infarct and now with gait issues, neuro consulted for further input/eval; recs appreciated, rec ASA/Atorva, no further imaging warranted at this time  - also with UTI as well, may also possibly be contributing  - monitor closely, safety precautions, ambulate with assistance  - PT rec ASHLEE, placement in progress
- worsening recurrently, noted to have more knees buckling, and difficulty with ambulation with home PT  - CTH notable for no acute changes, prior old R basal ganglia infarct noted, no prior hx of known CVA/TIA  - L knee xray and pelvic xray notable for: no acute fx/dislocations, lumbar degenerative changes with L knee tricompartment OA with slight medial tibiofemoral compartment predominance and generalized osteopenia  - R knee xray with tricompartment OA, no acute fractures  - PT rec ASHLEE  - given CTH notable for old R basal ganglia infarct and now with gait issues, neuro consulted for further input/eval; recs appreciated, rec ASA/Atorva, no further imaging warranted at this time  - also with UTI as well, may also possibly be contributing  - monitor closely, safety precautions, ambulate with assistance

## 2024-10-14 NOTE — PROGRESS NOTE ADULT - PROBLEM SELECTOR PROBLEM 6
Diabetes mellitus
HLD (hyperlipidemia)

## 2024-10-16 ENCOUNTER — APPOINTMENT (OUTPATIENT)
Dept: INTERNAL MEDICINE | Facility: CLINIC | Age: 84
End: 2024-10-16

## 2025-01-06 NOTE — ASSESSMENT
[FreeTextEntry1] : HCM- \par -Tdap last unknown, address at next visit\par -Prevnar 13 on 2/2018, due for Pneumovax 23, address at next visit\par -Breast ca screening: Mammo screen on 5/16/19 Bi-RADS 0 requiring further imaging. A 5 mm mass in the retroareolar right breast found and per radiology recommendations, patient needs a targeted right breast US. Missed 7/30 appt r/t being in rehab facility. Patient to re-schedule appt.\par -Colorectal ca screen: Colonoscopy last on 2/2019 with evidence of internal hemorrhoids. Would stop routine screening being that pt is >75yrs\par -DEXA scan on 3/2018 osteopenia, repeat scan 2020\par -RTC in 3 months with resident clinic for follow-up and health care needs \par \par *Assessment and Plan as noted above*\par  [de-identified] : General: Well appearing, no acute distress Neurologic: A&Ox3, No focal deficits Head: NCAT without abrasions, lacerations, or ecchymosis to head, face, or scalp Eyes: No scleral icterus, no gross abnormalities Respiratory: Equal chest wall expansion bilaterally, no accessory muscle use Lymphatic: No lymphadenopathy palpated Skin: Warm and dry Psychiatric: Normal mood and affect  Left sided CP. Upper extremity with some decreased function.   Examination of the Left hip reveals no obvious deformity or leg length discrepancy. There is no swelling noted. The patient is nontender to palpation over the greater trochanter, groin, and IT band. The patients range of motion is to 110 degrees of hip flexion, 40 degrees of abduction, 40 degrees of abduction, 20 degrees of adduction, 15 degrees of internal rotation and 35 degrees of external rotation. The patient has 5/5 strength to resisted hip flexion, abduction and adduction. The patient has a negative PARVEZ and positive FADIR Test. Positive Francois Test. Positive resisted SLR test at 30 degrees of hip flexion ([facility]). Negative Piriformis Test. No SI joint instability. There is no pain with logrolling. The calf and thigh are soft and nontender bilaterally. The patient is grossly neurovascularly intact distally. [de-identified] : The following radiographs were ordered and read by me during this patient's visit. I reviewed each radiograph in detail with the patient and discussed the findings as highlighted below. 3 views of the pelvis, 2 views of the left hip were obtained today that show no fracture, dislocation. Hip impingement present. There is no degenerative change seen. There is no malalignment. No obvious osseous abnormality. Otherwise unremarkable.

## 2025-04-03 ENCOUNTER — INPATIENT (INPATIENT)
Facility: HOSPITAL | Age: 85
LOS: 5 days | Discharge: NURSING, REHAB OR LONG-TERM CARE | End: 2025-04-09
Attending: HOSPITALIST | Admitting: HOSPITALIST
Payer: MEDICARE

## 2025-04-03 VITALS
DIASTOLIC BLOOD PRESSURE: 74 MMHG | OXYGEN SATURATION: 94 % | HEART RATE: 86 BPM | WEIGHT: 164.91 LBS | TEMPERATURE: 99 F | SYSTOLIC BLOOD PRESSURE: 117 MMHG | RESPIRATION RATE: 15 BRPM

## 2025-04-03 DIAGNOSIS — Z90.711 ACQUIRED ABSENCE OF UTERUS WITH REMAINING CERVICAL STUMP: Chronic | ICD-10-CM

## 2025-04-03 DIAGNOSIS — Z86.69 PERSONAL HISTORY OF OTHER DISEASES OF THE NERVOUS SYSTEM AND SENSE ORGANS: Chronic | ICD-10-CM

## 2025-04-03 LAB
APPEARANCE UR: CLEAR — SIGNIFICANT CHANGE UP
BASOPHILS # BLD AUTO: 0.03 K/UL — SIGNIFICANT CHANGE UP (ref 0–0.2)
BASOPHILS NFR BLD AUTO: 0.3 % — SIGNIFICANT CHANGE UP (ref 0–2)
BILIRUB UR-MCNC: NEGATIVE — SIGNIFICANT CHANGE UP
COLOR SPEC: YELLOW — SIGNIFICANT CHANGE UP
DIFF PNL FLD: ABNORMAL
EOSINOPHIL # BLD AUTO: 0.02 K/UL — SIGNIFICANT CHANGE UP (ref 0–0.5)
EOSINOPHIL NFR BLD AUTO: 0.2 % — SIGNIFICANT CHANGE UP (ref 0–6)
GAS PNL BLDV: SIGNIFICANT CHANGE UP
GLUCOSE UR QL: >=1000 MG/DL
HCT VFR BLD CALC: 35.5 % — SIGNIFICANT CHANGE UP (ref 34.5–45)
HGB BLD-MCNC: 11.6 G/DL — SIGNIFICANT CHANGE UP (ref 11.5–15.5)
IANC: 8.23 K/UL — HIGH (ref 1.8–7.4)
IMM GRANULOCYTES NFR BLD AUTO: 0.3 % — SIGNIFICANT CHANGE UP (ref 0–0.9)
KETONES UR-MCNC: ABNORMAL MG/DL
LEUKOCYTE ESTERASE UR-ACNC: ABNORMAL
LYMPHOCYTES # BLD AUTO: 2.52 K/UL — SIGNIFICANT CHANGE UP (ref 1–3.3)
LYMPHOCYTES # BLD AUTO: 22.1 % — SIGNIFICANT CHANGE UP (ref 13–44)
MCHC RBC-ENTMCNC: 26.9 PG — LOW (ref 27–34)
MCHC RBC-ENTMCNC: 32.7 G/DL — SIGNIFICANT CHANGE UP (ref 32–36)
MCV RBC AUTO: 82.4 FL — SIGNIFICANT CHANGE UP (ref 80–100)
MONOCYTES # BLD AUTO: 0.55 K/UL — SIGNIFICANT CHANGE UP (ref 0–0.9)
MONOCYTES NFR BLD AUTO: 4.8 % — SIGNIFICANT CHANGE UP (ref 2–14)
NEUTROPHILS # BLD AUTO: 8.23 K/UL — HIGH (ref 1.8–7.4)
NEUTROPHILS NFR BLD AUTO: 72.3 % — SIGNIFICANT CHANGE UP (ref 43–77)
NITRITE UR-MCNC: NEGATIVE — SIGNIFICANT CHANGE UP
NRBC # BLD AUTO: 0 K/UL — SIGNIFICANT CHANGE UP (ref 0–0)
NRBC # FLD: 0 K/UL — SIGNIFICANT CHANGE UP (ref 0–0)
NRBC BLD AUTO-RTO: 0 /100 WBCS — SIGNIFICANT CHANGE UP (ref 0–0)
PH UR: 6 — SIGNIFICANT CHANGE UP (ref 5–8)
PLATELET # BLD AUTO: 224 K/UL — SIGNIFICANT CHANGE UP (ref 150–400)
PROT UR-MCNC: 30 MG/DL
RBC # BLD: 4.31 M/UL — SIGNIFICANT CHANGE UP (ref 3.8–5.2)
RBC # FLD: 14.6 % — HIGH (ref 10.3–14.5)
SP GR SPEC: 1.04 — HIGH (ref 1–1.03)
UROBILINOGEN FLD QL: 0.2 MG/DL — SIGNIFICANT CHANGE UP (ref 0.2–1)
WBC # BLD: 11.38 K/UL — HIGH (ref 3.8–10.5)
WBC # FLD AUTO: 11.38 K/UL — HIGH (ref 3.8–10.5)

## 2025-04-03 PROCEDURE — 99285 EMERGENCY DEPT VISIT HI MDM: CPT

## 2025-04-03 PROCEDURE — 71045 X-RAY EXAM CHEST 1 VIEW: CPT | Mod: 26

## 2025-04-03 RX ORDER — ACETAMINOPHEN 500 MG/5ML
1000 LIQUID (ML) ORAL ONCE
Refills: 0 | Status: COMPLETED | OUTPATIENT
Start: 2025-04-03 | End: 2025-04-03

## 2025-04-03 RX ADMIN — Medication 400 MILLIGRAM(S): at 23:47

## 2025-04-03 NOTE — ED ADULT TRIAGE NOTE - CHIEF COMPLAINT QUOTE
C/O productive cough and leg weakness. No complaints of chest pain, headache, nausea, dizziness, vomiting  SOB, fever, chills verbalized. phx Dm2 hypothyroid HTN

## 2025-04-03 NOTE — ED PROVIDER NOTE - CLINICAL SUMMARY MEDICAL DECISION MAKING FREE TEXT BOX
84-year-old female with PMHx HTN, HLD, DM2, hypothyroidism, depression presenting with cough, weakness of bilateral LE x this morning. Seen at [facility] ED as stroke code this AM. Presented tachy and hypoxic. Has been on 2L O2 NC all day. CT head, CTA head and neck, CXR, pelvic XR, EKG wnl. Urine not test. Viral panel did not result. VS: febrile, O2 94% RA. PE:  appears cold, + cough, otherwise benign.  DDx includes but is not limited to UTI, PNA, flu/covid  Work up: basic labs, VBG, UA, viral panel, CXR, UA  Tx: ofirmev  Plan: will attempt to find source of fever and tx accordingly, likely will admit

## 2025-04-03 NOTE — ED PROVIDER NOTE - PHYSICAL EXAMINATION
Const: Awake, alert, mild acute distress.  Appears cold. Curled up under blanket on stretcher.  HEENT: NC/AT.  Moist mucous membranes.  Eyes: Extraocular movements intact b/l.  No scleral icterus.  Neck: Full ROM without pain. Supple.  Cardiac: Regular rate and regular rhythm. S1 S2 present.  Resp: No evidence of respiratory distress.  No stridor or wheeze.  Good air entry b/l, breath sounds clear to auscultation b/l. Dry cough appreciated during exam.  Abd: Non-distended. Soft, nontender  Skin: Normal coloration.  No rashes, abrasions or lacerations.  Neuro: Awake, alert & oriented x 3.  Moves all extremities symmetrically.  No obvious focal deficits.

## 2025-04-03 NOTE — ED ADULT NURSE NOTE - OBJECTIVE STATEMENT
Patient presents to ED A&04 ambulatory with walker at baseline coming in compalinig of cough congestion, increased weakness and generalized fatigue x1 week. son at home sick with same symptoms. Respirations even and unlabored, appears in NAD. No complaints of chest pain, headache, nausea, dizziness, vomiting  SOB, chills verbalized. 20GRAC in place, labs sent, rectally febrile, placed on cardiac monitor.

## 2025-04-03 NOTE — ED PROVIDER NOTE - OBJECTIVE STATEMENT
84-year-old female with PMHx HTN, HLD, DM2, hypothyroidism, depression presenting with cough, weakness of bilateral LE x this morning. Patient normally walks with a walker at baseline, family endorses worsening lower extremity weakness and fatigue over the past week. Patient taken to [facility] ED this AM.  Came in as a stroke code, was tachycardic to 107 and hypoxic to 89% on room air.  Not on home O2, had to be kept on 2L O2 NC all day.  CT head and CTA head and neck were negative.  Pelvic x-ray showed chronic degeneration of lower lumbar spine.  Magnesium was low, repleted.  EKG indicative of prior MI, not concerning for ACS.  CXR neg for PNA. Viral swab done but is sent out, no results.  Last time patient had similar symptoms that was secondary to UTI. Urine not tested in previous ED. Family was not content with care at previous ED, requested to be transferred to [facility], would cost $1300 so patient's son drove her to this ED instead. Denies fever, CP, SOB, abd pain, n/v, d/c, dysuria.

## 2025-04-03 NOTE — ED ADULT NURSE NOTE - IN THE PAST 12 MONTHS HAVE YOU USED DRUGS OTHER THAN THOSE REQUIRED FOR MEDICAL REASON?
From: Maddie Duckworth  To: ANGELO Kahn  Sent: 7/10/2019 9:39 PM CDT  Subject: Test Results Question    Dandy Lu,     Can you please get me my test results from all 3 tests done on June 3rd?  No one has been able to contact me from No

## 2025-04-03 NOTE — ED PROVIDER NOTE - ATTENDING CONTRIBUTION TO CARE
I have personally performed a face to face medical and diagnostic evaluation of the patient. I have discussed with and reviewed the Resident's note and agree with the History, ROS, Physical Exam and MDM unless otherwise indicated. A brief summary of my personal evaluation and impression can be found below.    84-year-old female with past medical history as detailed above presenting with chief complaint of generalized weakness, abnormal chest was seen at outside hospital, workup included code stroke workup with head CT, labs.  Preferred care here, left that hospital AGAINST MEDICAL ADVICE prior to completion of entire workup and brought here by family members.  Febrile tachycardic on presentation.  Endorses cough, body aches.  Denies any other symptoms.  On exam, vital signs stable, no reproducible abdominal tenderness to palpation.  No focal lung sounds.  Patient  lethargic appearing but alert and oriented x 3.  Plan for sepsis labs, possible viral syndrome.  Will assess for bacterial etiology.  Of note per review of paperwork at outside hospital, magnesium was repleted, no other actionable findings.  Reassess to dispo.  Anticipate admission for sepsis and weakness. Sarah Martino, ED Attending

## 2025-04-03 NOTE — ED PROVIDER NOTE - PROGRESS NOTE DETAILS
Work up sig for UTI. Labs and imaging results reviewed with pt at bedside and daughter in law over the phone. All questions answered. Pt aware of and in agreement with plan. Patient case discussed with hospitalist, ok to admit.  Patient is stable and ready for admission.

## 2025-04-04 DIAGNOSIS — E11.9 TYPE 2 DIABETES MELLITUS WITHOUT COMPLICATIONS: ICD-10-CM

## 2025-04-04 DIAGNOSIS — B34.8 OTHER VIRAL INFECTIONS OF UNSPECIFIED SITE: ICD-10-CM

## 2025-04-04 DIAGNOSIS — R53.1 WEAKNESS: ICD-10-CM

## 2025-04-04 DIAGNOSIS — N39.0 URINARY TRACT INFECTION, SITE NOT SPECIFIED: ICD-10-CM

## 2025-04-04 DIAGNOSIS — E78.5 HYPERLIPIDEMIA, UNSPECIFIED: ICD-10-CM

## 2025-04-04 DIAGNOSIS — Z29.9 ENCOUNTER FOR PROPHYLACTIC MEASURES, UNSPECIFIED: ICD-10-CM

## 2025-04-04 DIAGNOSIS — E03.9 HYPOTHYROIDISM, UNSPECIFIED: ICD-10-CM

## 2025-04-04 DIAGNOSIS — R05.9 COUGH, UNSPECIFIED: ICD-10-CM

## 2025-04-04 DIAGNOSIS — Z79.899 OTHER LONG TERM (CURRENT) DRUG THERAPY: ICD-10-CM

## 2025-04-04 DIAGNOSIS — I10 ESSENTIAL (PRIMARY) HYPERTENSION: ICD-10-CM

## 2025-04-04 LAB
A1C WITH ESTIMATED AVERAGE GLUCOSE RESULT: 7.9 % — HIGH (ref 4–5.6)
ADD ON TEST-SPECIMEN IN LAB: SIGNIFICANT CHANGE UP
ALBUMIN SERPL ELPH-MCNC: 3.4 G/DL — SIGNIFICANT CHANGE UP (ref 3.3–5)
ALBUMIN SERPL ELPH-MCNC: 3.8 G/DL — SIGNIFICANT CHANGE UP (ref 3.3–5)
ALP SERPL-CCNC: 50 U/L — SIGNIFICANT CHANGE UP (ref 40–120)
ALP SERPL-CCNC: 59 U/L — SIGNIFICANT CHANGE UP (ref 40–120)
ALT FLD-CCNC: 13 U/L — SIGNIFICANT CHANGE UP (ref 4–33)
ALT FLD-CCNC: 15 U/L — SIGNIFICANT CHANGE UP (ref 4–33)
ANION GAP SERPL CALC-SCNC: 12 MMOL/L — SIGNIFICANT CHANGE UP (ref 7–14)
ANION GAP SERPL CALC-SCNC: 18 MMOL/L — HIGH (ref 7–14)
APPEARANCE UR: CLEAR — SIGNIFICANT CHANGE UP
AST SERPL-CCNC: 19 U/L — SIGNIFICANT CHANGE UP (ref 4–32)
AST SERPL-CCNC: 19 U/L — SIGNIFICANT CHANGE UP (ref 4–32)
B PERT DNA SPEC QL NAA+PROBE: SIGNIFICANT CHANGE UP
B PERT+PARAPERT DNA PNL SPEC NAA+PROBE: SIGNIFICANT CHANGE UP
BACTERIA # UR AUTO: NEGATIVE /HPF — SIGNIFICANT CHANGE UP
BACTERIA # UR AUTO: NEGATIVE /HPF — SIGNIFICANT CHANGE UP
BILIRUB SERPL-MCNC: 0.5 MG/DL — SIGNIFICANT CHANGE UP (ref 0.2–1.2)
BILIRUB SERPL-MCNC: 0.7 MG/DL — SIGNIFICANT CHANGE UP (ref 0.2–1.2)
BILIRUB UR-MCNC: NEGATIVE — SIGNIFICANT CHANGE UP
BUN SERPL-MCNC: 14 MG/DL — SIGNIFICANT CHANGE UP (ref 7–23)
BUN SERPL-MCNC: 16 MG/DL — SIGNIFICANT CHANGE UP (ref 7–23)
C PNEUM DNA SPEC QL NAA+PROBE: SIGNIFICANT CHANGE UP
CALCIUM SERPL-MCNC: 8.6 MG/DL — SIGNIFICANT CHANGE UP (ref 8.4–10.5)
CALCIUM SERPL-MCNC: 8.9 MG/DL — SIGNIFICANT CHANGE UP (ref 8.4–10.5)
CAST: 0 /LPF — SIGNIFICANT CHANGE UP (ref 0–4)
CAST: 0 /LPF — SIGNIFICANT CHANGE UP (ref 0–4)
CHLORIDE SERPL-SCNC: 105 MMOL/L — SIGNIFICANT CHANGE UP (ref 98–107)
CHLORIDE SERPL-SCNC: 98 MMOL/L — SIGNIFICANT CHANGE UP (ref 98–107)
CO2 SERPL-SCNC: 21 MMOL/L — LOW (ref 22–31)
CO2 SERPL-SCNC: 23 MMOL/L — SIGNIFICANT CHANGE UP (ref 22–31)
COLOR SPEC: YELLOW — SIGNIFICANT CHANGE UP
CREAT SERPL-MCNC: 0.98 MG/DL — SIGNIFICANT CHANGE UP (ref 0.5–1.3)
CREAT SERPL-MCNC: 0.99 MG/DL — SIGNIFICANT CHANGE UP (ref 0.5–1.3)
DIFF PNL FLD: ABNORMAL
EGFR: 57 ML/MIN/1.73M2 — LOW
EGFR: 57 ML/MIN/1.73M2 — LOW
EGFR: 58 ML/MIN/1.73M2 — LOW
EGFR: 58 ML/MIN/1.73M2 — LOW
ESTIMATED AVERAGE GLUCOSE: 180 — SIGNIFICANT CHANGE UP
FLUAV AG NPH QL: SIGNIFICANT CHANGE UP
FLUAV SUBTYP SPEC NAA+PROBE: SIGNIFICANT CHANGE UP
FLUBV AG NPH QL: SIGNIFICANT CHANGE UP
FLUBV RNA SPEC QL NAA+PROBE: SIGNIFICANT CHANGE UP
GLUCOSE BLDC GLUCOMTR-MCNC: 125 MG/DL — HIGH (ref 70–99)
GLUCOSE BLDC GLUCOMTR-MCNC: 155 MG/DL — HIGH (ref 70–99)
GLUCOSE SERPL-MCNC: 117 MG/DL — HIGH (ref 70–99)
GLUCOSE SERPL-MCNC: 142 MG/DL — HIGH (ref 70–99)
GLUCOSE UR QL: >=1000 MG/DL
HADV DNA SPEC QL NAA+PROBE: SIGNIFICANT CHANGE UP
HCOV 229E RNA SPEC QL NAA+PROBE: SIGNIFICANT CHANGE UP
HCOV HKU1 RNA SPEC QL NAA+PROBE: SIGNIFICANT CHANGE UP
HCOV NL63 RNA SPEC QL NAA+PROBE: SIGNIFICANT CHANGE UP
HCOV OC43 RNA SPEC QL NAA+PROBE: SIGNIFICANT CHANGE UP
HCT VFR BLD CALC: 33.6 % — LOW (ref 34.5–45)
HGB BLD-MCNC: 10.5 G/DL — LOW (ref 11.5–15.5)
HMPV RNA SPEC QL NAA+PROBE: SIGNIFICANT CHANGE UP
HPIV1 RNA SPEC QL NAA+PROBE: SIGNIFICANT CHANGE UP
HPIV2 RNA SPEC QL NAA+PROBE: SIGNIFICANT CHANGE UP
HPIV3 RNA SPEC QL NAA+PROBE: SIGNIFICANT CHANGE UP
HPIV4 RNA SPEC QL NAA+PROBE: SIGNIFICANT CHANGE UP
KETONES UR-MCNC: ABNORMAL MG/DL
LEUKOCYTE ESTERASE UR-ACNC: ABNORMAL
M PNEUMO DNA SPEC QL NAA+PROBE: SIGNIFICANT CHANGE UP
MAGNESIUM SERPL-MCNC: 2.2 MG/DL — SIGNIFICANT CHANGE UP (ref 1.6–2.6)
MCHC RBC-ENTMCNC: 26.6 PG — LOW (ref 27–34)
MCHC RBC-ENTMCNC: 31.3 G/DL — LOW (ref 32–36)
MCV RBC AUTO: 85.1 FL — SIGNIFICANT CHANGE UP (ref 80–100)
MRSA PCR RESULT.: SIGNIFICANT CHANGE UP
NITRITE UR-MCNC: NEGATIVE — SIGNIFICANT CHANGE UP
NRBC # BLD AUTO: 0 K/UL — SIGNIFICANT CHANGE UP (ref 0–0)
NRBC # FLD: 0 K/UL — SIGNIFICANT CHANGE UP (ref 0–0)
NRBC BLD AUTO-RTO: 0 /100 WBCS — SIGNIFICANT CHANGE UP (ref 0–0)
PH UR: 7 — SIGNIFICANT CHANGE UP (ref 5–8)
PHOSPHATE SERPL-MCNC: 2.8 MG/DL — SIGNIFICANT CHANGE UP (ref 2.5–4.5)
PLATELET # BLD AUTO: 201 K/UL — SIGNIFICANT CHANGE UP (ref 150–400)
POTASSIUM SERPL-MCNC: 3.7 MMOL/L — SIGNIFICANT CHANGE UP (ref 3.5–5.3)
POTASSIUM SERPL-MCNC: 3.8 MMOL/L — SIGNIFICANT CHANGE UP (ref 3.5–5.3)
POTASSIUM SERPL-SCNC: 3.7 MMOL/L — SIGNIFICANT CHANGE UP (ref 3.5–5.3)
POTASSIUM SERPL-SCNC: 3.8 MMOL/L — SIGNIFICANT CHANGE UP (ref 3.5–5.3)
PROT SERPL-MCNC: 6.2 G/DL — SIGNIFICANT CHANGE UP (ref 6–8.3)
PROT SERPL-MCNC: 6.9 G/DL — SIGNIFICANT CHANGE UP (ref 6–8.3)
PROT UR-MCNC: 30 MG/DL
RAPID RVP RESULT: DETECTED
RBC # BLD: 3.95 M/UL — SIGNIFICANT CHANGE UP (ref 3.8–5.2)
RBC # FLD: 14.8 % — HIGH (ref 10.3–14.5)
RBC CASTS # UR COMP ASSIST: 2 /HPF — SIGNIFICANT CHANGE UP (ref 0–4)
RBC CASTS # UR COMP ASSIST: 3 /HPF — SIGNIFICANT CHANGE UP (ref 0–4)
REVIEW: SIGNIFICANT CHANGE UP
REVIEW: SIGNIFICANT CHANGE UP
RSV RNA NPH QL NAA+NON-PROBE: SIGNIFICANT CHANGE UP
RSV RNA SPEC QL NAA+PROBE: SIGNIFICANT CHANGE UP
RV+EV RNA SPEC QL NAA+PROBE: DETECTED
S AUREUS DNA NOSE QL NAA+PROBE: SIGNIFICANT CHANGE UP
SARS-COV-2 RNA SPEC QL NAA+PROBE: SIGNIFICANT CHANGE UP
SARS-COV-2 RNA SPEC QL NAA+PROBE: SIGNIFICANT CHANGE UP
SODIUM SERPL-SCNC: 137 MMOL/L — SIGNIFICANT CHANGE UP (ref 135–145)
SODIUM SERPL-SCNC: 140 MMOL/L — SIGNIFICANT CHANGE UP (ref 135–145)
SOURCE RESPIRATORY: SIGNIFICANT CHANGE UP
SP GR SPEC: 1.03 — SIGNIFICANT CHANGE UP (ref 1–1.03)
SQUAMOUS # UR AUTO: 0 /HPF — SIGNIFICANT CHANGE UP (ref 0–5)
SQUAMOUS # UR AUTO: 0 /HPF — SIGNIFICANT CHANGE UP (ref 0–5)
T4 FREE SERPL-MCNC: 0.9 NG/DL — SIGNIFICANT CHANGE UP (ref 0.9–1.7)
TSH SERPL-MCNC: 4.41 UIU/ML — HIGH (ref 0.27–4.2)
UROBILINOGEN FLD QL: 0.2 MG/DL — SIGNIFICANT CHANGE UP (ref 0.2–1)
WBC # BLD: 9.2 K/UL — SIGNIFICANT CHANGE UP (ref 3.8–10.5)
WBC # FLD AUTO: 9.2 K/UL — SIGNIFICANT CHANGE UP (ref 3.8–10.5)
WBC UR QL: 116 /HPF — HIGH (ref 0–5)
WBC UR QL: 31 /HPF — HIGH (ref 0–5)
YEAST-LIKE CELLS: PRESENT

## 2025-04-04 PROCEDURE — 99223 1ST HOSP IP/OBS HIGH 75: CPT

## 2025-04-04 RX ORDER — LEVOTHYROXINE SODIUM 300 MCG
25 TABLET ORAL DAILY
Refills: 0 | Status: DISCONTINUED | OUTPATIENT
Start: 2025-04-04 | End: 2025-04-09

## 2025-04-04 RX ORDER — ONDANSETRON HCL/PF 4 MG/2 ML
4 VIAL (ML) INJECTION EVERY 8 HOURS
Refills: 0 | Status: DISCONTINUED | OUTPATIENT
Start: 2025-04-04 | End: 2025-04-09

## 2025-04-04 RX ORDER — VENLAFAXINE HYDROCHLORIDE 37.5 MG/1
1 CAPSULE, EXTENDED RELEASE ORAL
Refills: 0 | DISCHARGE

## 2025-04-04 RX ORDER — VENLAFAXINE HYDROCHLORIDE 37.5 MG/1
150 CAPSULE, EXTENDED RELEASE ORAL ONCE
Refills: 0 | Status: DISCONTINUED | OUTPATIENT
Start: 2025-04-04 | End: 2025-04-04

## 2025-04-04 RX ORDER — METFORMIN HYDROCHLORIDE 850 MG/1
1 TABLET ORAL
Refills: 0 | DISCHARGE

## 2025-04-04 RX ORDER — ACETAMINOPHEN 500 MG/5ML
650 LIQUID (ML) ORAL EVERY 6 HOURS
Refills: 0 | Status: DISCONTINUED | OUTPATIENT
Start: 2025-04-04 | End: 2025-04-09

## 2025-04-04 RX ORDER — INSULIN LISPRO 100 U/ML
INJECTION, SOLUTION INTRAVENOUS; SUBCUTANEOUS AT BEDTIME
Refills: 0 | Status: DISCONTINUED | OUTPATIENT
Start: 2025-04-04 | End: 2025-04-09

## 2025-04-04 RX ORDER — DEXTROMETHORPHAN HBR, GUAIFENESIN 200 MG/10ML
1200 LIQUID ORAL EVERY 12 HOURS
Refills: 0 | Status: DISCONTINUED | OUTPATIENT
Start: 2025-04-04 | End: 2025-04-09

## 2025-04-04 RX ORDER — ATORVASTATIN CALCIUM 80 MG/1
1 TABLET, FILM COATED ORAL
Refills: 0 | DISCHARGE

## 2025-04-04 RX ORDER — GABAPENTIN 400 MG/1
1 CAPSULE ORAL
Refills: 0 | DISCHARGE

## 2025-04-04 RX ORDER — SODIUM CHLORIDE 0.65 %
1 AEROSOL, SPRAY (ML) NASAL
Refills: 0 | Status: DISCONTINUED | OUTPATIENT
Start: 2025-04-04 | End: 2025-04-09

## 2025-04-04 RX ORDER — DEXTROSE 50 % IN WATER 50 %
12.5 SYRINGE (ML) INTRAVENOUS ONCE
Refills: 0 | Status: DISCONTINUED | OUTPATIENT
Start: 2025-04-04 | End: 2025-04-09

## 2025-04-04 RX ORDER — VENLAFAXINE HYDROCHLORIDE 37.5 MG/1
225 CAPSULE, EXTENDED RELEASE ORAL DAILY
Refills: 0 | Status: DISCONTINUED | OUTPATIENT
Start: 2025-04-04 | End: 2025-04-04

## 2025-04-04 RX ORDER — INSULIN LISPRO 100 U/ML
INJECTION, SOLUTION INTRAVENOUS; SUBCUTANEOUS
Refills: 0 | Status: DISCONTINUED | OUTPATIENT
Start: 2025-04-04 | End: 2025-04-09

## 2025-04-04 RX ORDER — ENOXAPARIN SODIUM 100 MG/ML
40 INJECTION SUBCUTANEOUS EVERY 24 HOURS
Refills: 0 | Status: DISCONTINUED | OUTPATIENT
Start: 2025-04-04 | End: 2025-04-09

## 2025-04-04 RX ORDER — DEXTROSE 50 % IN WATER 50 %
25 SYRINGE (ML) INTRAVENOUS ONCE
Refills: 0 | Status: DISCONTINUED | OUTPATIENT
Start: 2025-04-04 | End: 2025-04-09

## 2025-04-04 RX ORDER — INFLUENZA A VIRUS A/IDAHO/07/2018 (H1N1) ANTIGEN (MDCK CELL DERIVED, PROPIOLACTONE INACTIVATED, INFLUENZA A VIRUS A/INDIANA/08/2018 (H3N2) ANTIGEN (MDCK CELL DERIVED, PROPIOLACTONE INACTIVATED), INFLUENZA B VIRUS B/SINGAPORE/INFTT-16-0610/2016 ANTIGEN (MDCK CELL DERIVED, PROPIOLACTONE INACTIVATED), INFLUENZA B VIRUS B/IOWA/06/2017 ANTIGEN (MDCK CELL DERIVED, PROPIOLACTONE INACTIVATED) 15; 15; 15; 15 UG/.5ML; UG/.5ML; UG/.5ML; UG/.5ML
0.5 INJECTION, SUSPENSION INTRAMUSCULAR ONCE
Refills: 0 | Status: DISCONTINUED | OUTPATIENT
Start: 2025-04-04 | End: 2025-04-09

## 2025-04-04 RX ORDER — BENZONATATE 100 MG
100 CAPSULE ORAL THREE TIMES A DAY
Refills: 0 | Status: DISCONTINUED | OUTPATIENT
Start: 2025-04-04 | End: 2025-04-09

## 2025-04-04 RX ORDER — GLUCAGON 3 MG/1
1 POWDER NASAL ONCE
Refills: 0 | Status: DISCONTINUED | OUTPATIENT
Start: 2025-04-04 | End: 2025-04-09

## 2025-04-04 RX ORDER — ATORVASTATIN CALCIUM 80 MG/1
40 TABLET, FILM COATED ORAL AT BEDTIME
Refills: 0 | Status: DISCONTINUED | OUTPATIENT
Start: 2025-04-04 | End: 2025-04-09

## 2025-04-04 RX ORDER — GABAPENTIN 400 MG/1
400 CAPSULE ORAL AT BEDTIME
Refills: 0 | Status: DISCONTINUED | OUTPATIENT
Start: 2025-04-04 | End: 2025-04-09

## 2025-04-04 RX ORDER — VENLAFAXINE HYDROCHLORIDE 37.5 MG/1
75 CAPSULE, EXTENDED RELEASE ORAL
Refills: 0 | Status: DISCONTINUED | OUTPATIENT
Start: 2025-04-04 | End: 2025-04-04

## 2025-04-04 RX ORDER — CEFTRIAXONE 500 MG/1
1000 INJECTION, POWDER, FOR SOLUTION INTRAMUSCULAR; INTRAVENOUS EVERY 24 HOURS
Refills: 0 | Status: COMPLETED | OUTPATIENT
Start: 2025-04-04 | End: 2025-04-06

## 2025-04-04 RX ORDER — LOSARTAN POTASSIUM 100 MG/1
1 TABLET, FILM COATED ORAL
Refills: 0 | DISCHARGE

## 2025-04-04 RX ORDER — DEXTROSE 50 % IN WATER 50 %
15 SYRINGE (ML) INTRAVENOUS ONCE
Refills: 0 | Status: DISCONTINUED | OUTPATIENT
Start: 2025-04-04 | End: 2025-04-09

## 2025-04-04 RX ORDER — SODIUM CHLORIDE 9 G/1000ML
1000 INJECTION, SOLUTION INTRAVENOUS
Refills: 0 | Status: DISCONTINUED | OUTPATIENT
Start: 2025-04-04 | End: 2025-04-09

## 2025-04-04 RX ORDER — MELATONIN 5 MG
3 TABLET ORAL AT BEDTIME
Refills: 0 | Status: DISCONTINUED | OUTPATIENT
Start: 2025-04-04 | End: 2025-04-09

## 2025-04-04 RX ORDER — BUSPIRONE HYDROCHLORIDE 15 MG/1
10 TABLET ORAL
Refills: 0 | Status: DISCONTINUED | OUTPATIENT
Start: 2025-04-04 | End: 2025-04-09

## 2025-04-04 RX ORDER — CEFTRIAXONE 500 MG/1
1000 INJECTION, POWDER, FOR SOLUTION INTRAMUSCULAR; INTRAVENOUS ONCE
Refills: 0 | Status: COMPLETED | OUTPATIENT
Start: 2025-04-04 | End: 2025-04-04

## 2025-04-04 RX ORDER — MAGNESIUM, ALUMINUM HYDROXIDE 200-200 MG
30 TABLET,CHEWABLE ORAL EVERY 4 HOURS
Refills: 0 | Status: DISCONTINUED | OUTPATIENT
Start: 2025-04-04 | End: 2025-04-09

## 2025-04-04 RX ORDER — VENLAFAXINE HYDROCHLORIDE 37.5 MG/1
225 CAPSULE, EXTENDED RELEASE ORAL DAILY
Refills: 0 | Status: DISCONTINUED | OUTPATIENT
Start: 2025-04-04 | End: 2025-04-09

## 2025-04-04 RX ADMIN — Medication 1 SPRAY(S): at 18:22

## 2025-04-04 RX ADMIN — Medication 1 APPLICATION(S): at 12:22

## 2025-04-04 RX ADMIN — VENLAFAXINE HYDROCHLORIDE 225 MILLIGRAM(S): 37.5 CAPSULE, EXTENDED RELEASE ORAL at 18:21

## 2025-04-04 RX ADMIN — ATORVASTATIN CALCIUM 40 MILLIGRAM(S): 80 TABLET, FILM COATED ORAL at 22:00

## 2025-04-04 RX ADMIN — BUSPIRONE HYDROCHLORIDE 10 MILLIGRAM(S): 15 TABLET ORAL at 18:22

## 2025-04-04 RX ADMIN — BUSPIRONE HYDROCHLORIDE 10 MILLIGRAM(S): 15 TABLET ORAL at 06:48

## 2025-04-04 RX ADMIN — Medication 650 MILLIGRAM(S): at 18:57

## 2025-04-04 RX ADMIN — ENOXAPARIN SODIUM 40 MILLIGRAM(S): 100 INJECTION SUBCUTANEOUS at 06:48

## 2025-04-04 RX ADMIN — CEFTRIAXONE 100 MILLIGRAM(S): 500 INJECTION, POWDER, FOR SOLUTION INTRAMUSCULAR; INTRAVENOUS at 06:49

## 2025-04-04 RX ADMIN — CEFTRIAXONE 100 MILLIGRAM(S): 500 INJECTION, POWDER, FOR SOLUTION INTRAMUSCULAR; INTRAVENOUS at 01:15

## 2025-04-04 RX ADMIN — Medication 650 MILLIGRAM(S): at 18:27

## 2025-04-04 RX ADMIN — GABAPENTIN 400 MILLIGRAM(S): 400 CAPSULE ORAL at 22:01

## 2025-04-04 RX ADMIN — Medication 20 MILLIGRAM(S): at 06:48

## 2025-04-04 RX ADMIN — INSULIN LISPRO 1: 100 INJECTION, SOLUTION INTRAVENOUS; SUBCUTANEOUS at 12:23

## 2025-04-04 RX ADMIN — Medication 100 MILLIGRAM(S): at 18:27

## 2025-04-04 RX ADMIN — DEXTROMETHORPHAN HBR, GUAIFENESIN 1200 MILLIGRAM(S): 200 LIQUID ORAL at 18:22

## 2025-04-04 RX ADMIN — Medication 20 MILLIGRAM(S): at 18:21

## 2025-04-04 RX ADMIN — DEXTROMETHORPHAN HBR, GUAIFENESIN 1200 MILLIGRAM(S): 200 LIQUID ORAL at 06:48

## 2025-04-04 RX ADMIN — Medication 1000 MILLILITER(S): at 01:14

## 2025-04-04 NOTE — PROGRESS NOTE ADULT - PROBLEM SELECTOR PLAN 1
-pt febrile to 101.7 in ED with associated cough and weakness  -pt without leukocytosis at [place] ED this morning however now with WBC 11.38  -UA grossly positive for WBC/blood, negative bacteria but positive for yeast-like cells, per prev ED documentation pt. did not receive ABx there.  -s/p CTX in ED, f/u official UCx and BCx, c/w CTX daily at this time -pt febrile to 101.7 in ED with associated cough and weakness  Leukocytosis on arrival in ED here, now resolving  -UA grossly positive for WBC/blood, negative bacteria but positive for yeast-like cells, per prev ED documentation pt. did not receive ABx there.  -s/p CTX in ED, f/u official UCx and BCx, c/w CTX daily at this time febrile to 101.7 in ED with associated cough and weakness  pt reporting mildly productive cough x 1-2 weeks. Son had similar cough and fatigue symptoms.  RVP pos for entero/rhino virus this admission.   No consolidation on CXR  Saturating well on RA    Plan:  supportive care  mucinex, hypertonic saline neb, nasal saline sprays  will consider additional airway clearance methods (chest PT, chest vest/aerobika) if needed.

## 2025-04-04 NOTE — PATIENT PROFILE ADULT - FALL HARM RISK - HARM RISK INTERVENTIONS
Assistance with ambulation/Assistance OOB with selected safe patient handling equipment/Communicate Risk of Fall with Harm to all staff/Discuss with provider need for PT consult/Monitor gait and stability/Reinforce activity limits and safety measures with patient and family/Tailored Fall Risk Interventions/Visual Cue: Yellow wristband and red socks/Bed in lowest position, wheels locked, appropriate side rails in place/Call bell, personal items and telephone in reach/Instruct patient to call for assistance before getting out of bed or chair/Non-slip footwear when patient is out of bed/[place] to call system/Physically safe environment - no spills, clutter or unnecessary equipment/Purposeful Proactive Rounding/Room/bathroom lighting operational, light cord in reach

## 2025-04-04 NOTE — PROGRESS NOTE ADULT - PROBLEM SELECTOR PLAN 2
-pt endorsing progressive lower extremity weakness x 1 week w/ associated fatigue  -possibly i/s/o infection - etiology include UTI vs URI  -RVP negative, UA with +blood and WBC  -f/u UCx and infectious workup as above  -pt. noted to have low mag in [place] ED - now repleted and wnl  -PT consult pt endorsing progressive lower extremity weakness x 1 week w/ associated fatigue, more consistent with whole body generalized weakness on exam  old R basal ganglia infarct on prior CTH  -i/s/o infection - etiology include UTI and URI    Pt had prior admission in 10/2024 for UTI with similar presentation of fatigue and generalized weakness.  Low magnesium at [facility] ED.  +entero/rhinovirus  UA with +blood and WBC  PT consult: rec ASHLEE    Plan:  -f/u UCx and infectious workup as above

## 2025-04-04 NOTE — ED ADULT NURSE REASSESSMENT NOTE - NS ED NURSE REASSESS COMMENT FT1
BREAK RN. pt received in spot 18. pt remains at baseline mental status, RR even unlabored completing full sentences. pt resting in stretcher comfortably at this time, no new complaints offered. stretcher lowest position siderails up safety measures in place. report given to KIMBERLY sandy. call peters within reach. Pending transportation upstairs.

## 2025-04-04 NOTE — PHYSICAL THERAPY INITIAL EVALUATION ADULT - PERTINENT HX OF CURRENT PROBLEM, REHAB EVAL
Pt is a 83year old Female with Past medical history of non-insulin dependent DM2, GERD, HTN, HLD, hypothyroidism, anxiety/depression who presents to the ED for bilateral lower extremity weakness and fatigue x 1 week now acutely worsening today. History obtained from [place] ED documentation in physical chart - family unable to be contacted for collateral - patient also poor historian. Patient reportedly tried to use restroom yesterday morning at 3AM with her son's assistance and upon returning to her bed was unable to move her legs into bed and also developed a cough. Son also reportedly with vomiting, diarrhea, and weakness over the past week as well. She went to [place] ED this morning and was worked up for code stroke - vitals at that time notable for tachycardia to low 100's and hypoxia to 89% on RA - she was subsequently placed on supplemental O2. Labs there notable for low magnesium that was repleted. CT scans were negative for intracranial pathology and CXR was negative for pneumonia - however patient and family were unhappy with care they received in the ED and requested transfer to [facility] - family opted to go with patient independently of hospital transport to [facility].

## 2025-04-04 NOTE — PROGRESS NOTE ADULT - PROBLEM SELECTOR PLAN 9
-pt unable to recall full list of medications and family could not be contacted overnight  -[facility] pharmacy emailed to assist with med rec. Diet: DASH/CC  DVT: Lovenox  Dispo: PT rec ASHLEE

## 2025-04-04 NOTE — PROGRESS NOTE ADULT - ASSESSMENT
83F with PMH non-insulin dependent DM2, GERD, HTN, HLD, hypothyroidism, anxiety/depression who presents to the ED for bilateral lower extremity weakness and fatigue x 1 week now acutely worsening today, found to have UTI 83F with PMH non-insulin dependent DM2, GERD, HTN, HLD, hypothyroidism, anxiety/depression who presents to the ED for fatigue and cough x 1-2 weeks, found to have entero/rhinovirus on RVP and pos UA.  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

## 2025-04-04 NOTE — H&P ADULT - NSHPREVIEWOFSYSTEMS_GEN_ALL_CORE
REVIEW OF SYSTEMS:    CONSTITUTIONAL: No weakness, fevers or chills  EYES/ENT: No visual changes;  No vertigo or throat pain   NECK: No pain or stiffness  RESPIRATORY: No cough, wheezing, hemoptysis; No shortness of breath  CARDIOVASCULAR: No chest pain or palpitations  GASTROINTESTINAL: No abdominal or epigastric pain. No nausea, vomiting, or hematemesis; No diarrhea or constipation. No melena or hematochezia.  GENITOURINARY: No dysuria, frequency or hematuria  NEUROLOGICAL: No numbness or weakness  SKIN: No itching, rashes REVIEW OF SYSTEMS:    CONSTITUTIONAL: +LE weakness, denies fevers or chills  EYES/ENT: No visual changes;  No vertigo or throat pain   NECK: No pain or stiffness  RESPIRATORY: +cough, no wheezing or hemoptysis; No shortness of breath  CARDIOVASCULAR: No chest pain or palpitations  GASTROINTESTINAL: No abdominal or epigastric pain. No nausea, vomiting, or hematemesis; No diarrhea or constipation. No melena or hematochezia.  GENITOURINARY: No dysuria, frequency or hematuria  NEUROLOGICAL: No numbness, +weakness of lower extremities.  SKIN: No itching, rashes

## 2025-04-04 NOTE — H&P ADULT - PROBLEM SELECTOR PLAN 3
-pt on metformin, jardiance, januvia outpt as per ED documentation  -hold oral meds and place on sliding scale with meals and at bedtime with fingersticks.

## 2025-04-04 NOTE — PHARMACOTHERAPY INTERVENTION NOTE - COMMENTS
Medication history is complete. Medication list updated in Outpatient Medication Record (OMR).  Medication list/dosages obtained from daughter over the phone; verified with outpatient pharmacy.    Of Note:   - Gabapentin 400mg rx prescribed as TID - pt takes it QHS  - Buspirone 10mg BID - takes it 11am and 5pm  - Pt on both Effexor XR 75mg and XR 150mg strengths   - Reports on Levothyroxine 25mcg once daily (prescription last refilled in Nov/2024 x 30 day supply)    Home Medications:  atorvastatin 40 mg oral tablet: 1 tab(s) orally once a day (at bedtime)   busPIRone 10 mg oral tablet: 1 tab(s) orally 2 times a day (11am and 5pm)   famotidine 20 mg oral tablet: 1 tab(s) orally once a day   gabapentin 400 mg oral capsule: 1 cap(s) orally once a day (at bedtime)   Januvia 100 mg oral tablet: 1 tab(s) orally once a day  Jardiance 25 mg oral tablet: 1 tab(s) orally once a day   levothyroxine 25 mcg (0.025 mg) oral tablet: 1 tab(s) orally once a day   losartan 25 mg oral tablet: 1 tab(s) orally once a day   metFORMIN 1000 mg oral tablet: 1 tab(s) orally 2 times a day  venlafaxine 150 mg oral capsule, extended release: 1 cap(s) orally once a day (also on XR 75mg)  venlafaxine 75 mg oral capsule, extended release: 1 cap(s) orally once a day (also on XR 150mg)

## 2025-04-04 NOTE — H&P ADULT - PROBLEM SELECTOR PLAN 9
-pt unable to recall full list of medications and family could not be contacted overnight  -[facility] pharmacy emailed to assist with med rec.

## 2025-04-04 NOTE — H&P ADULT - NSHPSOCIALHISTORY_GEN_ALL_CORE
Denies substance use. Lives with son, ambulates w/ walker and needs assistance in home. Has HHA 7 days a week.

## 2025-04-04 NOTE — H&P ADULT - NSHPPHYSICALEXAM_GEN_ALL_CORE
VITALS:   T(C): 37.5 (04-04-25 @ 01:16), Max: 38.7 (04-03-25 @ 23:37)  HR: 83 (04-04-25 @ 01:16) (83 - 94)  BP: 107/54 (04-04-25 @ 01:16) (107/54 - 118/95)  RR: 17 (04-04-25 @ 01:16) (15 - 17)  SpO2: 93% (04-04-25 @ 01:16) (93% - 98%)    GENERAL: NAD, lying in bed comfortably  HEAD:  Atraumatic, normocephalic  EYES: EOMI, PERRLA, conjunctiva and sclera clear  ENT: Moist mucous membranes  NECK: Supple, no JVD  HEART: Regular rate and rhythm, no murmurs, rubs, or gallops  LUNGS: Unlabored respirations.  Clear to auscultation bilaterally, no crackles, wheezing, or rhonchi  ABDOMEN: Soft, nontender, nondistended, +BS  EXTREMITIES: 2+ peripheral pulses bilaterally. No clubbing, cyanosis, +nonpitting edema of lower extremities bilaterally  NERVOUS SYSTEM:  A&Ox3, no focal deficits   SKIN: No rashes or lesions VITALS:   T(C): 37.5 (04-04-25 @ 01:16), Max: 38.7 (04-03-25 @ 23:37)  HR: 83 (04-04-25 @ 01:16) (83 - 94)  BP: 107/54 (04-04-25 @ 01:16) (107/54 - 118/95)  RR: 17 (04-04-25 @ 01:16) (15 - 17)  SpO2: 93% (04-04-25 @ 01:16) (93% - 98%)    GENERAL: NAD, lying in bed comfortably  HEAD:  Atraumatic, normocephalic  EYES: EOMI, PERRLA, conjunctiva and sclera clear  ENT: Moist mucous membranes  NECK: Supple, no JVD  HEART: Regular rate and rhythm, no murmurs, rubs, or gallops  LUNGS: Unlabored respirations.  Clear to auscultation bilaterally, no crackles, wheezing, or rhonchi, +dry cough present  ABDOMEN: Soft, nontender, nondistended, +BS  EXTREMITIES: 2+ peripheral pulses bilaterally. No clubbing, cyanosis, +nonpitting edema of lower extremities bilaterally  NERVOUS SYSTEM:  A&Ox3, no focal deficits   SKIN: No rashes or lesions

## 2025-04-04 NOTE — H&P ADULT - PROBLEM SELECTOR PLAN 2
-pt endorsing progressive lower extremity weakness x 1 week w/ associated fatigue  -possibly i/s/o infection - etiology include UTI vs URI  -RVP negative, UA with +blood and WBC  -f/u UCx and infectious workup as above  -pt. noted to have low mag in [place] ED - now repleted and wnl  -PT consult

## 2025-04-04 NOTE — H&P ADULT - PROBLEM SELECTOR PLAN 8
-pt with hx hypothyroidism however per ED med documentation at [place] was not on levothyroxine - will get TSH in AM and confirm med rec

## 2025-04-04 NOTE — PROGRESS NOTE ADULT - SUBJECTIVE AND OBJECTIVE BOX
***************************************************************  Baljinder Bird, PGY 1   Internal Medicine   ***************************************************************    GLEN RODRIGUEZ  84y  MRN: [id number]    Patient is a 84y old  Female who presents with a chief complaint of Weakness, UTI (04 Apr 2025 01:58)      Subjective: no events ON. Denies fever, CP, SOB, abn pain, N/V, dysuria. Tolerating diet.      MEDICATIONS  (STANDING):  atorvastatin 40 milliGRAM(s) Oral at bedtime  busPIRone 10 milliGRAM(s) Oral two times a day  cefTRIAXone   IVPB 1000 milliGRAM(s) IV Intermittent every 24 hours  chlorhexidine 2% Cloths 1 Application(s) Topical daily  dextrose 5%. 1000 milliLiter(s) (100 mL/Hr) IV Continuous <Continuous>  dextrose 5%. 1000 milliLiter(s) (50 mL/Hr) IV Continuous <Continuous>  dextrose 50% Injectable 25 Gram(s) IV Push once  dextrose 50% Injectable 12.5 Gram(s) IV Push once  dextrose 50% Injectable 25 Gram(s) IV Push once  enoxaparin Injectable 40 milliGRAM(s) SubCutaneous every 24 hours  famotidine    Tablet 20 milliGRAM(s) Oral two times a day  glucagon  Injectable 1 milliGRAM(s) IntraMuscular once  guaiFENesin ER 1200 milliGRAM(s) Oral every 12 hours  influenza  Vaccine (HIGH DOSE) 0.5 milliLiter(s) IntraMuscular once  insulin lispro (ADMELOG) corrective regimen sliding scale   SubCutaneous three times a day before meals  insulin lispro (ADMELOG) corrective regimen sliding scale   SubCutaneous at bedtime  venlafaxine 75 milliGRAM(s) Oral two times a day with meals    MEDICATIONS  (PRN):  acetaminophen     Tablet .. 650 milliGRAM(s) Oral every 6 hours PRN Temp greater or equal to 38C (100.4F), Mild Pain (1 - 3)  aluminum hydroxide/magnesium hydroxide/simethicone Suspension 30 milliLiter(s) Oral every 4 hours PRN Dyspepsia  benzonatate 100 milliGRAM(s) Oral three times a day PRN Cough  dextrose Oral Gel 15 Gram(s) Oral once PRN Blood Glucose LESS THAN 70 milliGRAM(s)/deciliter  melatonin 3 milliGRAM(s) Oral at bedtime PRN Insomnia  ondansetron Injectable 4 milliGRAM(s) IV Push every 8 hours PRN Nausea and/or Vomiting      Objective:    Vitals: Vital Signs Last 24 Hrs  T(C): 37.4 (04-04-25 @ 03:09), Max: 38.7 (04-03-25 @ 23:37)  T(F): 99.4 (04-04-25 @ 03:09), Max: 101.7 (04-03-25 @ 23:37)  HR: 89 (04-04-25 @ 03:09) (83 - 94)  BP: 105/58 (04-04-25 @ 03:09) (105/58 - 118/95)  BP(mean): 69 (04-04-25 @ 01:16) (69 - 69)  RR: 18 (04-04-25 @ 03:09) (15 - 18)  SpO2: 94% (04-04-25 @ 03:09) (93% - 98%)            I&O's Summary      PHYSICAL EXAM:  GENERAL: NAD  HEAD:  Atraumatic, Normocephalic  EYES: EOMI, conjunctiva and sclera clear  CHEST/LUNG: Clear to auscultation bilaterally; No rales, rhonchi, wheezing, or rubs  HEART: Regular rate and rhythm; No murmurs, rubs, or gallops  ABDOMEN: Soft, Nontender, Nondistended;   SKIN: No rashes or lesions  NERVOUS SYSTEM:  Alert & Oriented X3, no focal deficits    LABS:  04-04    x   |  x   |  14  ----------------------------<  117[H]  x    |  23  |  0.98  04-03    137  |  98  |  16  ----------------------------<  142[H]  3.8   |  21[L]  |  0.99    Ca    8.6      04 Apr 2025 06:46  Ca    8.9      03 Apr 2025 23:50  Phos  2.8     04-04  Mg     2.20     04-04    TPro  6.2  /  Alb  3.4  /  TBili  0.5  /  DBili  x   /  AST  19  /  ALT  13  /  AlkPhos  50  04-04  TPro  6.9  /  Alb  3.8  /  TBili  0.7  /  DBili  x   /  AST  19  /  ALT  15  /  AlkPhos  59  04-03                    Urinalysis Basic - ( 04 Apr 2025 06:46 )    Color: x / Appearance: x / SG: x / pH: x  Gluc: 117 mg/dL / Ketone: x  / Bili: x / Urobili: x   Blood: x / Protein: x / Nitrite: x   Leuk Esterase: x / RBC: x / WBC x   Sq Epi: x / Non Sq Epi: x / Bacteria: x                              10.5   9.20  )-----------( 201      ( 04 Apr 2025 06:46 )             33.6                         11.6   11.38 )-----------( 224      ( 03 Apr 2025 23:50 )             35.5     CAPILLARY BLOOD GLUCOSE      POCT Blood Glucose.: 155 mg/dL (04 Apr 2025 03:06)  POCT Blood Glucose.: 152 mg/dL (03 Apr 2025 21:16)      RADIOLOGY & ADDITIONAL TESTS:    Imaging Personally Reviewed:  [x ] YES  [ ] NO    Consultants involved in case:   Consultant(s) Notes Reviewed:  [ x] YES  [ ] NO:   Care Discussed with Consultants/Other Providers [x ] YES  [ ] NO         ***************************************************************  Baljinder Bird, PGY 1   Internal Medicine   ***************************************************************    GLEN RODRIGUEZ  84y  MRN: [id number]    Patient is a 84y old  Female who presents with a chief complaint of Weakness, UTI (04 Apr 2025 01:58)      Subjective: no events ON. Pt reports mildly productive cough, Denies fever, CP, SOB, abn pain, N/V, dysuria. Tolerating diet.      MEDICATIONS  (STANDING):  atorvastatin 40 milliGRAM(s) Oral at bedtime  busPIRone 10 milliGRAM(s) Oral two times a day  cefTRIAXone   IVPB 1000 milliGRAM(s) IV Intermittent every 24 hours  chlorhexidine 2% Cloths 1 Application(s) Topical daily  dextrose 5%. 1000 milliLiter(s) (100 mL/Hr) IV Continuous <Continuous>  dextrose 5%. 1000 milliLiter(s) (50 mL/Hr) IV Continuous <Continuous>  dextrose 50% Injectable 25 Gram(s) IV Push once  dextrose 50% Injectable 12.5 Gram(s) IV Push once  dextrose 50% Injectable 25 Gram(s) IV Push once  enoxaparin Injectable 40 milliGRAM(s) SubCutaneous every 24 hours  famotidine    Tablet 20 milliGRAM(s) Oral two times a day  glucagon  Injectable 1 milliGRAM(s) IntraMuscular once  guaiFENesin ER 1200 milliGRAM(s) Oral every 12 hours  influenza  Vaccine (HIGH DOSE) 0.5 milliLiter(s) IntraMuscular once  insulin lispro (ADMELOG) corrective regimen sliding scale   SubCutaneous three times a day before meals  insulin lispro (ADMELOG) corrective regimen sliding scale   SubCutaneous at bedtime  venlafaxine 75 milliGRAM(s) Oral two times a day with meals    MEDICATIONS  (PRN):  acetaminophen     Tablet .. 650 milliGRAM(s) Oral every 6 hours PRN Temp greater or equal to 38C (100.4F), Mild Pain (1 - 3)  aluminum hydroxide/magnesium hydroxide/simethicone Suspension 30 milliLiter(s) Oral every 4 hours PRN Dyspepsia  benzonatate 100 milliGRAM(s) Oral three times a day PRN Cough  dextrose Oral Gel 15 Gram(s) Oral once PRN Blood Glucose LESS THAN 70 milliGRAM(s)/deciliter  melatonin 3 milliGRAM(s) Oral at bedtime PRN Insomnia  ondansetron Injectable 4 milliGRAM(s) IV Push every 8 hours PRN Nausea and/or Vomiting      Objective:    Vitals: Vital Signs Last 24 Hrs  T(C): 37.4 (04-04-25 @ 03:09), Max: 38.7 (04-03-25 @ 23:37)  T(F): 99.4 (04-04-25 @ 03:09), Max: 101.7 (04-03-25 @ 23:37)  HR: 89 (04-04-25 @ 03:09) (83 - 94)  BP: 105/58 (04-04-25 @ 03:09) (105/58 - 118/95)  BP(mean): 69 (04-04-25 @ 01:16) (69 - 69)  RR: 18 (04-04-25 @ 03:09) (15 - 18)  SpO2: 94% (04-04-25 @ 03:09) (93% - 98%)            I&O's Summary      PHYSICAL EXAM:  GENERAL: NAD  HEAD:  Atraumatic, Normocephalic  EYES: EOMI, conjunctiva and sclera clear  CHEST/LUNG: Clear to auscultation bilaterally; No rales, rhonchi, wheezing, or rubs  HEART: Regular rate and rhythm; No murmurs, rubs, or gallops  ABDOMEN: Soft, Nontender, Nondistended;   SKIN: No rashes or lesions  NERVOUS SYSTEM:  Alert & Oriented X person and place.     LABS:  04-04    x   |  x   |  14  ----------------------------<  117[H]  x    |  23  |  0.98  04-03    137  |  98  |  16  ----------------------------<  142[H]  3.8   |  21[L]  |  0.99    Ca    8.6      04 Apr 2025 06:46  Ca    8.9      03 Apr 2025 23:50  Phos  2.8     04-04  Mg     2.20     04-04    TPro  6.2  /  Alb  3.4  /  TBili  0.5  /  DBili  x   /  AST  19  /  ALT  13  /  AlkPhos  50  04-04  TPro  6.9  /  Alb  3.8  /  TBili  0.7  /  DBili  x   /  AST  19  /  ALT  15  /  AlkPhos  59  04-03                    Urinalysis Basic - ( 04 Apr 2025 06:46 )    Color: x / Appearance: x / SG: x / pH: x  Gluc: 117 mg/dL / Ketone: x  / Bili: x / Urobili: x   Blood: x / Protein: x / Nitrite: x   Leuk Esterase: x / RBC: x / WBC x   Sq Epi: x / Non Sq Epi: x / Bacteria: x                              10.5   9.20  )-----------( 201      ( 04 Apr 2025 06:46 )             33.6                         11.6   11.38 )-----------( 224      ( 03 Apr 2025 23:50 )             35.5     CAPILLARY BLOOD GLUCOSE      POCT Blood Glucose.: 155 mg/dL (04 Apr 2025 03:06)  POCT Blood Glucose.: 152 mg/dL (03 Apr 2025 21:16)      RADIOLOGY & ADDITIONAL TESTS:    Imaging Personally Reviewed:  [x ] YES  [ ] NO    Consultants involved in case:   Consultant(s) Notes Reviewed:  [ x] YES  [ ] NO:   Care Discussed with Consultants/Other Providers [x ] YES  [ ] NO         ***************************************************************  Baljinder Bird, PGY 1   Internal Medicine   ***************************************************************    GLEN RODRIGUEZ  84y  MRN: [id number]    Patient is a 84y old  Female who presents with a chief complaint of Weakness, UTI (04 Apr 2025 01:58)      Subjective: no events ON. Pt reports mildly productive cough, Denies fever, CP, SOB, abn pain, N/V, dysuria. Tolerating diet.      MEDICATIONS  (STANDING):  atorvastatin 40 milliGRAM(s) Oral at bedtime  busPIRone 10 milliGRAM(s) Oral two times a day  cefTRIAXone   IVPB 1000 milliGRAM(s) IV Intermittent every 24 hours  chlorhexidine 2% Cloths 1 Application(s) Topical daily  dextrose 5%. 1000 milliLiter(s) (100 mL/Hr) IV Continuous <Continuous>  dextrose 5%. 1000 milliLiter(s) (50 mL/Hr) IV Continuous <Continuous>  dextrose 50% Injectable 25 Gram(s) IV Push once  dextrose 50% Injectable 12.5 Gram(s) IV Push once  dextrose 50% Injectable 25 Gram(s) IV Push once  enoxaparin Injectable 40 milliGRAM(s) SubCutaneous every 24 hours  famotidine    Tablet 20 milliGRAM(s) Oral two times a day  glucagon  Injectable 1 milliGRAM(s) IntraMuscular once  guaiFENesin ER 1200 milliGRAM(s) Oral every 12 hours  influenza  Vaccine (HIGH DOSE) 0.5 milliLiter(s) IntraMuscular once  insulin lispro (ADMELOG) corrective regimen sliding scale   SubCutaneous three times a day before meals  insulin lispro (ADMELOG) corrective regimen sliding scale   SubCutaneous at bedtime  venlafaxine 75 milliGRAM(s) Oral two times a day with meals    MEDICATIONS  (PRN):  acetaminophen     Tablet .. 650 milliGRAM(s) Oral every 6 hours PRN Temp greater or equal to 38C (100.4F), Mild Pain (1 - 3)  aluminum hydroxide/magnesium hydroxide/simethicone Suspension 30 milliLiter(s) Oral every 4 hours PRN Dyspepsia  benzonatate 100 milliGRAM(s) Oral three times a day PRN Cough  dextrose Oral Gel 15 Gram(s) Oral once PRN Blood Glucose LESS THAN 70 milliGRAM(s)/deciliter  melatonin 3 milliGRAM(s) Oral at bedtime PRN Insomnia  ondansetron Injectable 4 milliGRAM(s) IV Push every 8 hours PRN Nausea and/or Vomiting      Objective:    Vitals: Vital Signs Last 24 Hrs  T(C): 37.4 (04-04-25 @ 03:09), Max: 38.7 (04-03-25 @ 23:37)  T(F): 99.4 (04-04-25 @ 03:09), Max: 101.7 (04-03-25 @ 23:37)  HR: 89 (04-04-25 @ 03:09) (83 - 94)  BP: 105/58 (04-04-25 @ 03:09) (105/58 - 118/95)  BP(mean): 69 (04-04-25 @ 01:16) (69 - 69)  RR: 18 (04-04-25 @ 03:09) (15 - 18)  SpO2: 94% (04-04-25 @ 03:09) (93% - 98%)            I&O's Summary      PHYSICAL EXAM:  GENERAL: NAD  HEAD:  Atraumatic, Normocephalic  EYES: EOMI, conjunctiva and sclera clear  CHEST/LUNG: Clear to auscultation bilaterally; No rales, rhonchi, wheezing, or rubs  HEART: Regular rate and rhythm; No murmurs, rubs, or gallops  ABDOMEN: Soft, Nontender, Nondistended;   SKIN: No rashes or lesions  NERVOUS SYSTEM:  Alert & Oriented X person and place.     LABS:  04-04    140  |  105  |  14  ----------------------------<  117[H]  3.7   |  23  |  0.98    Ca    8.6      04 Apr 2025 06:46  Phos  2.8     04-04  Mg     2.20     04-04    TPro  6.2  /  Alb  3.4  /  TBili  0.5  /  DBili  x   /  AST  19  /  ALT  13  /  AlkPhos  50  04-04      04-04    x   |  x   |  14  ----------------------------<  117[H]  x    |  23  |  0.98  04-03    137  |  98  |  16  ----------------------------<  142[H]  3.8   |  21[L]  |  0.99    Ca    8.6      04 Apr 2025 06:46  Ca    8.9      03 Apr 2025 23:50  Phos  2.8     04-04  Mg     2.20     04-04    TPro  6.2  /  Alb  3.4  /  TBili  0.5  /  DBili  x   /  AST  19  /  ALT  13  /  AlkPhos  50  04-04  TPro  6.9  /  Alb  3.8  /  TBili  0.7  /  DBili  x   /  AST  19  /  ALT  15  /  AlkPhos  59  04-03                    Urinalysis Basic - ( 04 Apr 2025 06:46 )    Color: x / Appearance: x / SG: x / pH: x  Gluc: 117 mg/dL / Ketone: x  / Bili: x / Urobili: x   Blood: x / Protein: x / Nitrite: x   Leuk Esterase: x / RBC: x / WBC x   Sq Epi: x / Non Sq Epi: x / Bacteria: x                              10.5   9.20  )-----------( 201      ( 04 Apr 2025 06:46 )             33.6                         11.6   11.38 )-----------( 224      ( 03 Apr 2025 23:50 )             35.5     A1C with Estimated Average Glucose Result: 7.9:Entero/Rhinovirus (RapRVP): Detected (04.04.25 @ 01:22)     CAPILLARY BLOOD GLUCOSE      POCT Blood Glucose.: 155 mg/dL (04 Apr 2025 03:06)  POCT Blood Glucose.: 152 mg/dL (03 Apr 2025 21:16)      RADIOLOGY & ADDITIONAL TESTS:    Imaging Personally Reviewed:  [x ] YES  [ ] NO  < from: Xray Chest 1 View- PORTABLE-Urgent (Xray Chest 1 View- PORTABLE-Urgent .) (04.03.25 @ 23:53) >    IMPRESSION:  Clear lungs.    < end of copied text >      Consultants involved in case:   Consultant(s) Notes Reviewed:  [ ] YES  [ ] NO:   Care Discussed with Consultants/Other Providers [ ] YES  [ ] NO

## 2025-04-04 NOTE — H&P ADULT - NSHPLABSRESULTS_GEN_ALL_CORE
LABS:                          11.6   11.38 )-----------( 224      ( 03 Apr 2025 23:50 )             35.5     04-03    137  |  98  |  16  ----------------------------<  142[H]  3.8   |  21[L]  |  0.99    Ca    8.9      03 Apr 2025 23:50  Phos  3.0     04-03  Mg     2.20     04-03    TPro  6.9  /  Alb  3.8  /  TBili  0.7  /  DBili  x   /  AST  19  /  ALT  15  /  AlkPhos  59  04-03

## 2025-04-04 NOTE — PROGRESS NOTE ADULT - PROBLEM SELECTOR PLAN 8
-pt with hx hypothyroidism however per ED med documentation at [place] was not on levothyroxine - will get TSH in AM and confirm med rec Synthroid 25mcg daily Synthroid 25mcg daily  - TSH 4.41, though free T4 wnl .9  - otpt f/u re: repeat TFTs (TSH could be elevated in setting of acute illness)

## 2025-04-04 NOTE — PHYSICAL THERAPY INITIAL EVALUATION ADULT - ADDITIONAL COMMENTS
Pt reports previously ambulating with a rolling walker however she stopped after experiencing a fall while utilizing one. Pt reports her son assists her with all ADLs. Pt was left in semisupine position in room in NAD, primafit intact, KIMBERLY Peacock made aware.

## 2025-04-04 NOTE — H&P ADULT - ASSESSMENT
83F with PMH non-insulin dependent DM2, GERD, HTN, HLD, hypothyroidism, anxiety/depression who presents to the ED for bilateral lower extremity weakness and fatigue x 1 week now acutely worsening today, found to have UTI

## 2025-04-04 NOTE — PHYSICAL THERAPY INITIAL EVALUATION ADULT - IMPAIRMENTS FOUND, PT EVAL
aerobic capacity/endurance/arousal, attention, and cognition/ergonomics and body mechanics/gait, locomotion, and balance/muscle strength/posture/ROM

## 2025-04-04 NOTE — H&P ADULT - PROBLEM SELECTOR PLAN 1
-pt febrile to 101.7 in ED with associated cough and weakness  -pt without leukocytosis at [place] ED this morning however now with WBC 11.38  -UA grossly positive for WBC/blood, negative bacteria but positive for yeast-like cells, per prev ED documentation pt. did not receive ABx there.  -s/p CTX in ED, f/u official UCx and BCx, c/w CTX daily at this time

## 2025-04-04 NOTE — PROGRESS NOTE ADULT - PROBLEM SELECTOR PLAN 4
-pt with new dry sounding cough, allegedly started appx. 24 hours ago  -pt also hypoxic in [place] ED requiring O2 supplementation now on RA.  -of note son with similar cough and weakness who is caretaker for patient  -Rapid RVP, CXR negative at [facility] ED  -will give guaifenesin, tessalon perles for cough -pt on metformin, jardiance, januvia  -hold oral meds   sliding scale with meals and at bedtime with fingersticks. -pt on metformin, jardiance, januvia  -hold oral meds   sliding scale with meals and at bedtime with fingersticks.  - A1C 7.9  - FS currently in acceptable range, goal FS <180

## 2025-04-04 NOTE — PROGRESS NOTE ADULT - PROBLEM SELECTOR PLAN 5
-pt on losartan outpatient, will hold i/s/o presumed infection. -pt on losartan outpatient, will hold i/s/o infection with soft BPs.

## 2025-04-04 NOTE — H&P ADULT - HISTORY OF PRESENT ILLNESS
83F with PMH non-insulin dependent DM2, GERD, HTN, HLD, hypothyroidism, anxiety/depression who presents to the ED for bilateral lower extremity weakness x 1 week now acutely worsening today. Patient reportedly went to [place] ED this morning and was worked up for code stroke - vitals at that time notable for tachycardia to low 100's and hypoxia to 89% on RA - she was subsequently placed on 2LNC. CT scans were negative for intracranial pathology and CXR was negative for pneumonia - however patient and family were unhappy with care they received in the ED and requested transfer to [facility] - family opted to drive patient to [facility] directly to avoid transportation cost.    In [facility] ED patient found to be febrile to 101.7 - was given tylenol and BCX/UA/UCX obtained. UA+, repeat CXR negative, given CTX for presumed UTI. ALso given 1L bolus. 83F with PMH non-insulin dependent DM2, GERD, HTN, HLD, hypothyroidism, anxiety/depression who presents to the ED for bilateral lower extremity weakness and fatigue x 1 week now acutely worsening today. History obtained from [place] ED documentation in physical chart - family unable to be contacted for collateral - patient also poor historian. Patient reportedly tried to use restroom yesterday morning at 3AM with her son's assistance and upon returning to her bed was unable to move her legs into bed and also developed a cough. Son also reportedly with vomiting, diarrhea, and weakness over the past week as well. She went to [place] ED this morning and was worked up for code stroke - vitals at that time notable for tachycardia to low 100's and hypoxia to 89% on RA - she was subsequently placed on supplemental O2. Labs there notable for low magnesium that was repleted. CT scans were negative for intracranial pathology and CXR was negative for pneumonia - however patient and family were unhappy with care they received in the ED and requested transfer to [facility] - family opted to go with patient independently of hospital transport to [facility].    In [facility] ED patient found to be febrile to 101.7 - was given tylenol and BCX/UA/UCX obtained. UA+, repeat CXR negative, given CTX for presumed UTI. Also given 1L bolus. 83F with PMH non-insulin dependent DM2, GERD, HTN, HLD, hypothyroidism, anxiety/depression who presents to the ED for bilateral lower extremity weakness and fatigue x 1 week now acutely worsening today. History obtained from [place] ED documentation in physical chart - family unable to be contacted for collateral - patient also poor historian. Patient reportedly tried to use restroom yesterday morning at 3AM with her son's assistance and upon returning to her bed was unable to move her legs into bed and also developed a cough. Son also reportedly with vomiting, diarrhea, and weakness over the past week as well. She went to [place] ED this morning and was worked up for code stroke - vitals at that time notable for tachycardia to low 100's and hypoxia to 89% on RA - she was subsequently placed on supplemental O2. Labs there notable for low magnesium that was repleted. CT scans were negative for intracranial pathology and CXR was negative for pneumonia - however patient and family were unhappy with care they received in the ED and requested transfer to [facility] - family opted to go with patient independently of hospital transport to [facility]    In [facility] ED patient found to be febrile to 101.7 - was given tylenol and BCX/UA/UCX obtained. Procal weakly positive 0.18. UA+, repeat CXR negative, given CTX for presumed UTI. Also given 1L bolus.

## 2025-04-04 NOTE — PHYSICAL THERAPY INITIAL EVALUATION ADULT - GENERAL OBSERVATIONS, REHAB EVAL
Pt received in semisupine position in bed in NAD, primafit intact, A+Ox4, however pt is confused and forgetful at times. Pt received in semisupine position in bed in NAD, primafit intact, A+Ox4, however pt is confused and forgetful at times, HR 80s.

## 2025-04-04 NOTE — H&P ADULT - PROBLEM SELECTOR PLAN 4
-pt with new wet-sounding cough, allegedly started appx. 24 hours ago  -pt also hypoxic in [place] ED requiring O2 supplementation now on RA.  -of note son with similar cough and weakness who is caretaker for patient  -Rapid RVP, CXR negative at [facility] ED  -will give guaifenesin, tessalon perles for cough -pt with new dry sounding cough, allegedly started appx. 24 hours ago  -pt also hypoxic in [place] ED requiring O2 supplementation now on RA.  -of note son with similar cough and weakness who is caretaker for patient  -Rapid RVP, CXR negative at [facility] ED  -will give guaifenesin, tessalon perles for cough

## 2025-04-05 LAB
A1C WITH ESTIMATED AVERAGE GLUCOSE RESULT: 8 % — HIGH (ref 4–5.6)
ALBUMIN SERPL ELPH-MCNC: 3.3 G/DL — SIGNIFICANT CHANGE UP (ref 3.3–5)
ALP SERPL-CCNC: 51 U/L — SIGNIFICANT CHANGE UP (ref 40–120)
ALT FLD-CCNC: 13 U/L — SIGNIFICANT CHANGE UP (ref 4–33)
ANION GAP SERPL CALC-SCNC: 13 MMOL/L — SIGNIFICANT CHANGE UP (ref 7–14)
AST SERPL-CCNC: 23 U/L — SIGNIFICANT CHANGE UP (ref 4–32)
BASOPHILS # BLD AUTO: 0.03 K/UL — SIGNIFICANT CHANGE UP (ref 0–0.2)
BASOPHILS NFR BLD AUTO: 0.4 % — SIGNIFICANT CHANGE UP (ref 0–2)
BILIRUB SERPL-MCNC: 0.2 MG/DL — SIGNIFICANT CHANGE UP (ref 0.2–1.2)
BUN SERPL-MCNC: 14 MG/DL — SIGNIFICANT CHANGE UP (ref 7–23)
CALCIUM SERPL-MCNC: 8.9 MG/DL — SIGNIFICANT CHANGE UP (ref 8.4–10.5)
CHLORIDE SERPL-SCNC: 104 MMOL/L — SIGNIFICANT CHANGE UP (ref 98–107)
CO2 SERPL-SCNC: 21 MMOL/L — LOW (ref 22–31)
CREAT SERPL-MCNC: 0.94 MG/DL — SIGNIFICANT CHANGE UP (ref 0.5–1.3)
CULTURE RESULTS: SIGNIFICANT CHANGE UP
EGFR: 61 ML/MIN/1.73M2 — SIGNIFICANT CHANGE UP
EGFR: 61 ML/MIN/1.73M2 — SIGNIFICANT CHANGE UP
EOSINOPHIL # BLD AUTO: 0.16 K/UL — SIGNIFICANT CHANGE UP (ref 0–0.5)
EOSINOPHIL NFR BLD AUTO: 2.1 % — SIGNIFICANT CHANGE UP (ref 0–6)
ESTIMATED AVERAGE GLUCOSE: 183 — SIGNIFICANT CHANGE UP
GAS PNL BLDV: SIGNIFICANT CHANGE UP
GLUCOSE SERPL-MCNC: 116 MG/DL — HIGH (ref 70–99)
HCT VFR BLD CALC: 34.2 % — LOW (ref 34.5–45)
HGB BLD-MCNC: 10.9 G/DL — LOW (ref 11.5–15.5)
IANC: 3.58 K/UL — SIGNIFICANT CHANGE UP (ref 1.8–7.4)
IMM GRANULOCYTES NFR BLD AUTO: 0.1 % — SIGNIFICANT CHANGE UP (ref 0–0.9)
LYMPHOCYTES # BLD AUTO: 3.34 K/UL — HIGH (ref 1–3.3)
LYMPHOCYTES # BLD AUTO: 43.1 % — SIGNIFICANT CHANGE UP (ref 13–44)
MAGNESIUM SERPL-MCNC: 2.1 MG/DL — SIGNIFICANT CHANGE UP (ref 1.6–2.6)
MCHC RBC-ENTMCNC: 26.3 PG — LOW (ref 27–34)
MCHC RBC-ENTMCNC: 31.9 G/DL — LOW (ref 32–36)
MCV RBC AUTO: 82.6 FL — SIGNIFICANT CHANGE UP (ref 80–100)
MONOCYTES # BLD AUTO: 0.63 K/UL — SIGNIFICANT CHANGE UP (ref 0–0.9)
MONOCYTES NFR BLD AUTO: 8.1 % — SIGNIFICANT CHANGE UP (ref 2–14)
NEUTROPHILS # BLD AUTO: 3.58 K/UL — SIGNIFICANT CHANGE UP (ref 1.8–7.4)
NEUTROPHILS NFR BLD AUTO: 46.2 % — SIGNIFICANT CHANGE UP (ref 43–77)
NRBC # BLD AUTO: 0 K/UL — SIGNIFICANT CHANGE UP (ref 0–0)
NRBC # FLD: 0 K/UL — SIGNIFICANT CHANGE UP (ref 0–0)
NRBC BLD AUTO-RTO: 0 /100 WBCS — SIGNIFICANT CHANGE UP (ref 0–0)
PHOSPHATE SERPL-MCNC: 3 MG/DL — SIGNIFICANT CHANGE UP (ref 2.5–4.5)
PLATELET # BLD AUTO: 210 K/UL — SIGNIFICANT CHANGE UP (ref 150–400)
POTASSIUM SERPL-MCNC: 3.6 MMOL/L — SIGNIFICANT CHANGE UP (ref 3.5–5.3)
POTASSIUM SERPL-SCNC: 3.6 MMOL/L — SIGNIFICANT CHANGE UP (ref 3.5–5.3)
PROT SERPL-MCNC: 6.4 G/DL — SIGNIFICANT CHANGE UP (ref 6–8.3)
RBC # BLD: 4.14 M/UL — SIGNIFICANT CHANGE UP (ref 3.8–5.2)
RBC # FLD: 14.6 % — HIGH (ref 10.3–14.5)
SODIUM SERPL-SCNC: 138 MMOL/L — SIGNIFICANT CHANGE UP (ref 135–145)
SPECIMEN SOURCE: SIGNIFICANT CHANGE UP
WBC # BLD: 7.75 K/UL — SIGNIFICANT CHANGE UP (ref 3.8–10.5)
WBC # FLD AUTO: 7.75 K/UL — SIGNIFICANT CHANGE UP (ref 3.8–10.5)

## 2025-04-05 PROCEDURE — 99232 SBSQ HOSP IP/OBS MODERATE 35: CPT

## 2025-04-05 RX ADMIN — Medication 100 MILLIGRAM(S): at 12:37

## 2025-04-05 RX ADMIN — Medication 4 MILLILITER(S): at 21:20

## 2025-04-05 RX ADMIN — ENOXAPARIN SODIUM 40 MILLIGRAM(S): 100 INJECTION SUBCUTANEOUS at 05:59

## 2025-04-05 RX ADMIN — Medication 20 MILLIGRAM(S): at 05:43

## 2025-04-05 RX ADMIN — Medication 1 SPRAY(S): at 05:56

## 2025-04-05 RX ADMIN — DEXTROMETHORPHAN HBR, GUAIFENESIN 1200 MILLIGRAM(S): 200 LIQUID ORAL at 05:45

## 2025-04-05 RX ADMIN — ATORVASTATIN CALCIUM 40 MILLIGRAM(S): 80 TABLET, FILM COATED ORAL at 21:39

## 2025-04-05 RX ADMIN — Medication 3 MILLIGRAM(S): at 21:39

## 2025-04-05 RX ADMIN — DEXTROMETHORPHAN HBR, GUAIFENESIN 1200 MILLIGRAM(S): 200 LIQUID ORAL at 17:39

## 2025-04-05 RX ADMIN — Medication 1 SPRAY(S): at 17:36

## 2025-04-05 RX ADMIN — GABAPENTIN 400 MILLIGRAM(S): 400 CAPSULE ORAL at 21:39

## 2025-04-05 RX ADMIN — Medication 1 APPLICATION(S): at 12:37

## 2025-04-05 RX ADMIN — Medication 25 MICROGRAM(S): at 05:43

## 2025-04-05 RX ADMIN — Medication 20 MILLIGRAM(S): at 17:37

## 2025-04-05 RX ADMIN — BUSPIRONE HYDROCHLORIDE 10 MILLIGRAM(S): 15 TABLET ORAL at 17:37

## 2025-04-05 RX ADMIN — CEFTRIAXONE 100 MILLIGRAM(S): 500 INJECTION, POWDER, FOR SOLUTION INTRAMUSCULAR; INTRAVENOUS at 05:45

## 2025-04-05 RX ADMIN — VENLAFAXINE HYDROCHLORIDE 225 MILLIGRAM(S): 37.5 CAPSULE, EXTENDED RELEASE ORAL at 14:10

## 2025-04-05 RX ADMIN — Medication 4 MILLILITER(S): at 07:57

## 2025-04-05 RX ADMIN — BUSPIRONE HYDROCHLORIDE 10 MILLIGRAM(S): 15 TABLET ORAL at 05:56

## 2025-04-05 NOTE — PROGRESS NOTE ADULT - ASSESSMENT
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

## 2025-04-05 NOTE — DIETITIAN INITIAL EVALUATION ADULT - PERTINENT LABORATORY DATA
04-05 @ 06:50: Na 138, BUN 14, Cr 0.94, [H], K+ 3.6, Phos 3.0, Mg 2.10, Alk Phos 51, ALT/SGPT 13, AST/SGOT 23, HbA1c --    CAPILLARY BLOOD GLUCOSE  POCT Blood Glucose.: 148 mg/dL (05 Apr 2025 11:59)  POCT Blood Glucose.: 109 mg/dL (05 Apr 2025 08:13)  POCT Blood Glucose.: 129 mg/dL (04 Apr 2025 21:30)  POCT Blood Glucose.: 113 mg/dL (04 Apr 2025 16:56)

## 2025-04-05 NOTE — DIETITIAN INITIAL EVALUATION ADULT - NS FNS DIET ORDER
Diet, Regular:   Consistent Carbohydrate {No Snacks} (CSTCHO)  DASH/TLC {Sodium & Cholesterol Restricted} (DASH) (04-04-25 @ 01:51) [Active]

## 2025-04-05 NOTE — PROGRESS NOTE ADULT - SUBJECTIVE AND OBJECTIVE BOX
***************************************************************  Fransico Barrientos, PGY2  Internal Medicine   Preferred contact via [employer] Teams   ***************************************************************    GLEN RODRIGUEZ  84y  MRN: [id number]    Patient is a 84y old  Female who presents with a chief complaint of Weakness, UTI (2025 08:07)      Interval/Overnight Events: no events ON.     Subjective: Pt seen and examined at bedside. Denies fever, CP, SOB, abn pain, N/V, dysuria. Tolerating diet.      MEDICATIONS  (STANDING):  atorvastatin 40 milliGRAM(s) Oral at bedtime  busPIRone 10 milliGRAM(s) Oral two times a day  cefTRIAXone   IVPB 1000 milliGRAM(s) IV Intermittent every 24 hours  chlorhexidine 2% Cloths 1 Application(s) Topical daily  dextrose 5%. 1000 milliLiter(s) (100 mL/Hr) IV Continuous <Continuous>  dextrose 5%. 1000 milliLiter(s) (50 mL/Hr) IV Continuous <Continuous>  dextrose 50% Injectable 25 Gram(s) IV Push once  dextrose 50% Injectable 12.5 Gram(s) IV Push once  dextrose 50% Injectable 25 Gram(s) IV Push once  enoxaparin Injectable 40 milliGRAM(s) SubCutaneous every 24 hours  famotidine    Tablet 20 milliGRAM(s) Oral two times a day  gabapentin 400 milliGRAM(s) Oral at bedtime  glucagon  Injectable 1 milliGRAM(s) IntraMuscular once  guaiFENesin ER 1200 milliGRAM(s) Oral every 12 hours  influenza  Vaccine (HIGH DOSE) 0.5 milliLiter(s) IntraMuscular once  insulin lispro (ADMELOG) corrective regimen sliding scale   SubCutaneous three times a day before meals  insulin lispro (ADMELOG) corrective regimen sliding scale   SubCutaneous at bedtime  levothyroxine 25 MICROGram(s) Oral daily  sodium chloride 0.65% Nasal 1 Spray(s) Both Nostrils two times a day  sodium chloride 3%  Inhalation 4 milliLiter(s) Inhalation every 12 hours  venlafaxine XR. 225 milliGRAM(s) Oral daily    MEDICATIONS  (PRN):  acetaminophen     Tablet .. 650 milliGRAM(s) Oral every 6 hours PRN Temp greater or equal to 38C (100.4F), Mild Pain (1 - 3)  aluminum hydroxide/magnesium hydroxide/simethicone Suspension 30 milliLiter(s) Oral every 4 hours PRN Dyspepsia  benzonatate 100 milliGRAM(s) Oral three times a day PRN Cough  dextrose Oral Gel 15 Gram(s) Oral once PRN Blood Glucose LESS THAN 70 milliGRAM(s)/deciliter  melatonin 3 milliGRAM(s) Oral at bedtime PRN Insomnia  ondansetron Injectable 4 milliGRAM(s) IV Push every 8 hours PRN Nausea and/or Vomiting      Objective:    Vitals: Vital Signs Last 24 Hrs  T(C): 37.2 (25 @ 05:20), Max: 37.2 (25 @ 05:20)  T(F): 99 (--25 @ 05:20), Max: 99 (04-05-25 @ 05:20)  HR: 79 (25 @ 05:20) (73 - 81)  BP: 109/62 (-25 @ 05:20) (109/62 - 129/78)  BP(mean): --  RR: 18 (25 @ 05:20) (18 - 19)  SpO2: 95% (25 @ 05:20) (93% - 95%)                I&O's Summary      PHYSICAL EXAM:  GENERAL: NAD  HEAD:  Atraumatic, Normocephalic  EYES: EOMI, conjunctiva and sclera clear  CHEST/LUNG: Clear to auscultation bilaterally; No rales, rhonchi, wheezing, or rubs  HEART: Regular rate and rhythm; No murmurs, rubs, or gallops  ABDOMEN: Soft, Nontender, Nondistended;   SKIN: No rashes or lesions  NERVOUS SYSTEM:  Alert & Oriented X3, no focal deficits    LABS:                        10.5   9.20  )-----------( 201      ( 2025 06:46 )             33.6                         11.6   11.38 )-----------( 224      ( 2025 23:50 )             35.5     04-04    140  |  105  |  14  ----------------------------<  117[H]  3.7   |  23  |  0.98  04-03    137  |  98  |  16  ----------------------------<  142[H]  3.8   |  21[L]  |  0.99    Ca    8.6      2025 06:46  Ca    8.9      2025 23:50  Phos  2.8     04-04  Mg     2.20     04-04    TPro  6.2  /  Alb  3.4  /  TBili  0.5  /  DBili  x   /  AST  19  /  ALT  13  /  AlkPhos  50  04-04  TPro  6.9  /  Alb  3.8  /  TBili  0.7  /  DBili  x   /  AST  19  /  ALT  15  /  AlkPhos  59  04-03    CAPILLARY BLOOD GLUCOSE      POCT Blood Glucose.: 129 mg/dL (2025 21:30)  POCT Blood Glucose.: 113 mg/dL (2025 16:56)  POCT Blood Glucose.: 158 mg/dL (2025 12:05)  POCT Blood Glucose.: 125 mg/dL (2025 08:12)        Urinalysis Basic - ( 2025 11:26 )    Color: Yellow / Appearance: Clear / S.028 / pH: x  Gluc: x / Ketone: Trace mg/dL  / Bili: Negative / Urobili: 0.2 mg/dL   Blood: x / Protein: 30 mg/dL / Nitrite: Negative   Leuk Esterase: Small / RBC: 2 /HPF / WBC 31 /HPF   Sq Epi: x / Non Sq Epi: 0 /HPF / Bacteria: Negative /HPF          RADIOLOGY & ADDITIONAL TESTS:    Imaging Personally Reviewed:  [x ] YES  [ ] NO    Consultants involved in case:   Consultant(s) Notes Reviewed:  [ x] YES  [ ] NO:   Care Discussed with Consultants/Other Providers [x ] YES  [ ] NO         ***************************************************************  Fransico Barrientos, PGY2  Internal Medicine   Preferred contact via [employer] Teams   ***************************************************************    GLEN RODRIGUEZ  84y  MRN: [id number]    Patient is a 84y old  Female who presents with a chief complaint of Weakness, UTI (2025 08:07)      Interval/Overnight Events: no events ON.     Subjective: No subjective complaints     MEDICATIONS  (STANDING):  atorvastatin 40 milliGRAM(s) Oral at bedtime  busPIRone 10 milliGRAM(s) Oral two times a day  cefTRIAXone   IVPB 1000 milliGRAM(s) IV Intermittent every 24 hours  chlorhexidine 2% Cloths 1 Application(s) Topical daily  dextrose 5%. 1000 milliLiter(s) (100 mL/Hr) IV Continuous <Continuous>  dextrose 5%. 1000 milliLiter(s) (50 mL/Hr) IV Continuous <Continuous>  dextrose 50% Injectable 25 Gram(s) IV Push once  dextrose 50% Injectable 12.5 Gram(s) IV Push once  dextrose 50% Injectable 25 Gram(s) IV Push once  enoxaparin Injectable 40 milliGRAM(s) SubCutaneous every 24 hours  famotidine    Tablet 20 milliGRAM(s) Oral two times a day  gabapentin 400 milliGRAM(s) Oral at bedtime  glucagon  Injectable 1 milliGRAM(s) IntraMuscular once  guaiFENesin ER 1200 milliGRAM(s) Oral every 12 hours  influenza  Vaccine (HIGH DOSE) 0.5 milliLiter(s) IntraMuscular once  insulin lispro (ADMELOG) corrective regimen sliding scale   SubCutaneous three times a day before meals  insulin lispro (ADMELOG) corrective regimen sliding scale   SubCutaneous at bedtime  levothyroxine 25 MICROGram(s) Oral daily  sodium chloride 0.65% Nasal 1 Spray(s) Both Nostrils two times a day  sodium chloride 3%  Inhalation 4 milliLiter(s) Inhalation every 12 hours  venlafaxine XR. 225 milliGRAM(s) Oral daily    MEDICATIONS  (PRN):  acetaminophen     Tablet .. 650 milliGRAM(s) Oral every 6 hours PRN Temp greater or equal to 38C (100.4F), Mild Pain (1 - 3)  aluminum hydroxide/magnesium hydroxide/simethicone Suspension 30 milliLiter(s) Oral every 4 hours PRN Dyspepsia  benzonatate 100 milliGRAM(s) Oral three times a day PRN Cough  dextrose Oral Gel 15 Gram(s) Oral once PRN Blood Glucose LESS THAN 70 milliGRAM(s)/deciliter  melatonin 3 milliGRAM(s) Oral at bedtime PRN Insomnia  ondansetron Injectable 4 milliGRAM(s) IV Push every 8 hours PRN Nausea and/or Vomiting      Objective:    Vitals: Vital Signs Last 24 Hrs  T(C): 37.2 (25 @ 05:20), Max: 37.2 (-25 @ 05:20)  T(F): 99 (25 @ 05:20), Max: 99 (05-25 @ 05:20)  HR: 79 (25 @ 05:20) (73 - 81)  BP: 109/62 (25 @ 05:20) (109/62 - 129/78)  BP(mean): --  RR: 18 (25 @ 05:20) (18 - 19)  SpO2: 95% (25 @ 05:20) (93% - 95%)                I&O's Summary      PHYSICAL EXAM:  GENERAL: NAD  HEAD:  Atraumatic, Normocephalic  EYES: EOMI, conjunctiva and sclera clear  CHEST/LUNG: Clear to auscultation bilaterally; No rales, rhonchi, wheezing, or rubs  HEART: Regular rate and rhythm; No murmurs, rubs, or gallops  ABDOMEN: Soft, Nontender, Nondistended;   SKIN: No rashes or lesions  NERVOUS SYSTEM:  Alert & Oriented X3, no focal deficits    LABS:                        10.5   9.20  )-----------( 201      ( 2025 06:46 )             33.6                         11.6   11.38 )-----------( 224      ( 2025 23:50 )             35.5     04-04    140  |  105  |  14  ----------------------------<  117[H]  3.7   |  23  |  0.98  04-03    137  |  98  |  16  ----------------------------<  142[H]  3.8   |  21[L]  |  0.99    Ca    8.6      2025 06:46  Ca    8.9      2025 23:50  Phos  2.8     04-04  Mg     2.20     04-04    TPro  6.2  /  Alb  3.4  /  TBili  0.5  /  DBili  x   /  AST  19  /  ALT  13  /  AlkPhos  50  04-04  TPro  6.9  /  Alb  3.8  /  TBili  0.7  /  DBili  x   /  AST  19  /  ALT  15  /  AlkPhos  59  04-03    CAPILLARY BLOOD GLUCOSE      POCT Blood Glucose.: 129 mg/dL (2025 21:30)  POCT Blood Glucose.: 113 mg/dL (2025 16:56)  POCT Blood Glucose.: 158 mg/dL (2025 12:05)  POCT Blood Glucose.: 125 mg/dL (2025 08:12)        Urinalysis Basic - ( 2025 11:26 )    Color: Yellow / Appearance: Clear / S.028 / pH: x  Gluc: x / Ketone: Trace mg/dL  / Bili: Negative / Urobili: 0.2 mg/dL   Blood: x / Protein: 30 mg/dL / Nitrite: Negative   Leuk Esterase: Small / RBC: 2 /HPF / WBC 31 /HPF   Sq Epi: x / Non Sq Epi: 0 /HPF / Bacteria: Negative /HPF          RADIOLOGY & ADDITIONAL TESTS:    Imaging Personally Reviewed:  [x ] YES  [ ] NO    Consultants involved in case:   Consultant(s) Notes Reviewed:  [ x] YES  [ ] NO:   Care Discussed with Consultants/Other Providers [x ] YES  [ ] NO

## 2025-04-05 NOTE — PROGRESS NOTE ADULT - PROBLEM SELECTOR PLAN 3
-pt febrile to 101.7 in ED with associated cough and weakness  Leukocytosis on arrival in ED here, now resolving  -UA grossly positive for WBC/blood, negative bacteria but positive for yeast-like cells, repeat without yeast cells, possible skin contaminant  -s/p CTX in ED, f/u official UCx and BCx, c/w CTX daily at this time

## 2025-04-05 NOTE — DIETITIAN INITIAL EVALUATION ADULT - REASON FOR ADMISSION
Weakness, UTI  Per chart, Pt is 83F with PMH prior CVA (old R basal ganglia infarct on prior CTH),  non-insulin dependent DM2, GERD, HTN, HLD, hypothyroidism, anxiety/depression who presents to the ED for fatigue and cough x 1-2 weeks, found to have entero/rhinovirus on RVP and pos UA.

## 2025-04-05 NOTE — DIETITIAN INITIAL EVALUATION ADULT - OTHER CALCULATIONS
Defer fluid needs to MD/Team.   Ideal Body Weight: 115lbs / 52.1kg +/-10%  Wt hx as per [facility] HIE:74.8kg (dosing wt 4/3), 72.6kg (10/8). Wt increased x6 months (3.0%). Continue to monitor wt trend.

## 2025-04-05 NOTE — PROGRESS NOTE ADULT - PROBLEM SELECTOR PLAN 1
febrile to 101.7 in ED with associated cough and weakness  pt reporting mildly productive cough x 1-2 weeks. Son had similar cough and fatigue symptoms.  RVP pos for entero/rhino virus this admission.   No consolidation on CXR  Saturating well on RA    Plan:  supportive care  mucinex, hypertonic saline neb, nasal saline sprays  will consider additional airway clearance methods (chest PT, chest vest/aerobika) if needed.

## 2025-04-05 NOTE — PROGRESS NOTE ADULT - PROBLEM SELECTOR PLAN 2
pt endorsing progressive lower extremity weakness x 1 week w/ associated fatigue, more consistent with whole body generalized weakness on exam  old R basal ganglia infarct on prior CTH  -i/s/o infection - etiology include UTI and URI    Pt had prior admission in 10/2024 for UTI with similar presentation of fatigue and generalized weakness.  Low magnesium at [facility] ED.  +entero/rhinovirus  UA with +blood and WBC  PT consult: rec [facility]    Plan:  -f/u UCx and infectious workup as above

## 2025-04-05 NOTE — PROGRESS NOTE ADULT - PROBLEM SELECTOR PLAN 8
Synthroid 25mcg daily  - TSH 4.41, though free T4 wnl .9  - otpt f/u re: repeat TFTs (TSH could be elevated in setting of acute illness)

## 2025-04-05 NOTE — DIETITIAN INITIAL EVALUATION ADULT - ORAL INTAKE PTA/DIET HISTORY
Patient reports generally good appetite/PO intake PTA, consumes a regular diet at baseline. Pt confirms NKFA, food intolerance. Pt reported UBW 143lbs/65kg (last year). Pt denies any recent weight changes.

## 2025-04-05 NOTE — PROGRESS NOTE ADULT - PROBLEM SELECTOR PLAN 4
-pt on metformin, jardiance, januvia  -hold oral meds   sliding scale with meals and at bedtime with fingersticks.  - A1C 7.9  - FS currently in acceptable range, goal FS <180

## 2025-04-05 NOTE — DIETITIAN INITIAL EVALUATION ADULT - OTHER INFO
Pt seen at bedside. Pt reported good PO intake in house. No intake reported per RN flowsheets. Continue to monitor and document PO in flowsheets. Pt is edentulous. Pt reported tolerating current diet. No GI distress reported i.e. nausea, vomiting, diarrhea. Unknown last BM. Not noted to be on a bowel regimen. Recommend to continue to monitor and document BMs in RN flowsheet. Bilateral foot edema 1+ noted, per RN flowsheet. Labs noted for elevated HgA1c 8.0% (4/5). Pt educated on Consistent Carbohydrate diet and understanding. RDN answered questions/concerns related to diet. RDN remains available and will follow-up per protocol.

## 2025-04-06 LAB
ALBUMIN SERPL ELPH-MCNC: 3.5 G/DL — SIGNIFICANT CHANGE UP (ref 3.3–5)
ALP SERPL-CCNC: 51 U/L — SIGNIFICANT CHANGE UP (ref 40–120)
ALT FLD-CCNC: 16 U/L — SIGNIFICANT CHANGE UP (ref 4–33)
ANION GAP SERPL CALC-SCNC: 15 MMOL/L — HIGH (ref 7–14)
AST SERPL-CCNC: 21 U/L — SIGNIFICANT CHANGE UP (ref 4–32)
BASOPHILS # BLD AUTO: 0.03 K/UL — SIGNIFICANT CHANGE UP (ref 0–0.2)
BASOPHILS NFR BLD AUTO: 0.5 % — SIGNIFICANT CHANGE UP (ref 0–2)
BILIRUB SERPL-MCNC: 0.3 MG/DL — SIGNIFICANT CHANGE UP (ref 0.2–1.2)
BUN SERPL-MCNC: 18 MG/DL — SIGNIFICANT CHANGE UP (ref 7–23)
CALCIUM SERPL-MCNC: 9 MG/DL — SIGNIFICANT CHANGE UP (ref 8.4–10.5)
CHLORIDE SERPL-SCNC: 103 MMOL/L — SIGNIFICANT CHANGE UP (ref 98–107)
CO2 SERPL-SCNC: 20 MMOL/L — LOW (ref 22–31)
CREAT SERPL-MCNC: 0.98 MG/DL — SIGNIFICANT CHANGE UP (ref 0.5–1.3)
EGFR: 58 ML/MIN/1.73M2 — LOW
EGFR: 58 ML/MIN/1.73M2 — LOW
EOSINOPHIL # BLD AUTO: 0.17 K/UL — SIGNIFICANT CHANGE UP (ref 0–0.5)
EOSINOPHIL NFR BLD AUTO: 2.7 % — SIGNIFICANT CHANGE UP (ref 0–6)
GLUCOSE SERPL-MCNC: 121 MG/DL — HIGH (ref 70–99)
HCT VFR BLD CALC: 37.2 % — SIGNIFICANT CHANGE UP (ref 34.5–45)
HGB BLD-MCNC: 11.7 G/DL — SIGNIFICANT CHANGE UP (ref 11.5–15.5)
IANC: 2.36 K/UL — SIGNIFICANT CHANGE UP (ref 1.8–7.4)
IMM GRANULOCYTES NFR BLD AUTO: 0.2 % — SIGNIFICANT CHANGE UP (ref 0–0.9)
LYMPHOCYTES # BLD AUTO: 3.33 K/UL — HIGH (ref 1–3.3)
LYMPHOCYTES # BLD AUTO: 52.4 % — HIGH (ref 13–44)
MAGNESIUM SERPL-MCNC: 2 MG/DL — SIGNIFICANT CHANGE UP (ref 1.6–2.6)
MCHC RBC-ENTMCNC: 26.3 PG — LOW (ref 27–34)
MCHC RBC-ENTMCNC: 31.5 G/DL — LOW (ref 32–36)
MCV RBC AUTO: 83.6 FL — SIGNIFICANT CHANGE UP (ref 80–100)
MONOCYTES # BLD AUTO: 0.45 K/UL — SIGNIFICANT CHANGE UP (ref 0–0.9)
MONOCYTES NFR BLD AUTO: 7.1 % — SIGNIFICANT CHANGE UP (ref 2–14)
NEUTROPHILS # BLD AUTO: 2.36 K/UL — SIGNIFICANT CHANGE UP (ref 1.8–7.4)
NEUTROPHILS NFR BLD AUTO: 37.1 % — LOW (ref 43–77)
NRBC # BLD AUTO: 0 K/UL — SIGNIFICANT CHANGE UP (ref 0–0)
NRBC # FLD: 0 K/UL — SIGNIFICANT CHANGE UP (ref 0–0)
NRBC BLD AUTO-RTO: 0 /100 WBCS — SIGNIFICANT CHANGE UP (ref 0–0)
PHOSPHATE SERPL-MCNC: 3.2 MG/DL — SIGNIFICANT CHANGE UP (ref 2.5–4.5)
PLATELET # BLD AUTO: 212 K/UL — SIGNIFICANT CHANGE UP (ref 150–400)
POTASSIUM SERPL-MCNC: 3.7 MMOL/L — SIGNIFICANT CHANGE UP (ref 3.5–5.3)
POTASSIUM SERPL-SCNC: 3.7 MMOL/L — SIGNIFICANT CHANGE UP (ref 3.5–5.3)
PROT SERPL-MCNC: 6.5 G/DL — SIGNIFICANT CHANGE UP (ref 6–8.3)
RBC # BLD: 4.45 M/UL — SIGNIFICANT CHANGE UP (ref 3.8–5.2)
RBC # FLD: 14.6 % — HIGH (ref 10.3–14.5)
SODIUM SERPL-SCNC: 138 MMOL/L — SIGNIFICANT CHANGE UP (ref 135–145)
WBC # BLD: 6.35 K/UL — SIGNIFICANT CHANGE UP (ref 3.8–10.5)
WBC # FLD AUTO: 6.35 K/UL — SIGNIFICANT CHANGE UP (ref 3.8–10.5)

## 2025-04-06 PROCEDURE — 99232 SBSQ HOSP IP/OBS MODERATE 35: CPT

## 2025-04-06 RX ADMIN — Medication 4 MILLILITER(S): at 21:14

## 2025-04-06 RX ADMIN — GABAPENTIN 400 MILLIGRAM(S): 400 CAPSULE ORAL at 21:22

## 2025-04-06 RX ADMIN — CEFTRIAXONE 100 MILLIGRAM(S): 500 INJECTION, POWDER, FOR SOLUTION INTRAMUSCULAR; INTRAVENOUS at 07:06

## 2025-04-06 RX ADMIN — DEXTROMETHORPHAN HBR, GUAIFENESIN 1200 MILLIGRAM(S): 200 LIQUID ORAL at 17:12

## 2025-04-06 RX ADMIN — BUSPIRONE HYDROCHLORIDE 10 MILLIGRAM(S): 15 TABLET ORAL at 05:48

## 2025-04-06 RX ADMIN — VENLAFAXINE HYDROCHLORIDE 225 MILLIGRAM(S): 37.5 CAPSULE, EXTENDED RELEASE ORAL at 11:14

## 2025-04-06 RX ADMIN — Medication 1 APPLICATION(S): at 11:14

## 2025-04-06 RX ADMIN — Medication 1 SPRAY(S): at 17:12

## 2025-04-06 RX ADMIN — INSULIN LISPRO 1: 100 INJECTION, SOLUTION INTRAVENOUS; SUBCUTANEOUS at 12:13

## 2025-04-06 RX ADMIN — BUSPIRONE HYDROCHLORIDE 10 MILLIGRAM(S): 15 TABLET ORAL at 17:12

## 2025-04-06 RX ADMIN — DEXTROMETHORPHAN HBR, GUAIFENESIN 1200 MILLIGRAM(S): 200 LIQUID ORAL at 05:48

## 2025-04-06 RX ADMIN — ENOXAPARIN SODIUM 40 MILLIGRAM(S): 100 INJECTION SUBCUTANEOUS at 05:47

## 2025-04-06 RX ADMIN — ATORVASTATIN CALCIUM 40 MILLIGRAM(S): 80 TABLET, FILM COATED ORAL at 21:23

## 2025-04-06 RX ADMIN — Medication 20 MILLIGRAM(S): at 17:12

## 2025-04-06 RX ADMIN — Medication 4 MILLILITER(S): at 07:33

## 2025-04-06 RX ADMIN — Medication 20 MILLIGRAM(S): at 05:48

## 2025-04-06 RX ADMIN — Medication 1 SPRAY(S): at 05:47

## 2025-04-06 RX ADMIN — Medication 25 MICROGRAM(S): at 05:48

## 2025-04-06 NOTE — DISCHARGE NOTE PROVIDER - PROVIDER TOKENS
PROVIDER:[TOKEN:[[id number]],FOLLOWUP:[1-3 days],ESTABLISHEDPATIENT:[T]],PROVIDER:[TOKEN:[[id number]],FOLLOWUP:[1 week],ESTABLISHEDPATIENT:[T]]

## 2025-04-06 NOTE — DISCHARGE NOTE PROVIDER - CARE PROVIDER_API CALL
Marina Hackett  Internal Medicine  [address] [place] [address]  Phone: [phone]  Fax: [phone]  Established Patient  Follow Up Time: 1-3 days    Jose Maria Diehl  Internal Medicine  [address] [place] [address]  Phone: [phone]  Fax: [phone]  Established Patient  Follow Up Time: 1 week

## 2025-04-06 NOTE — DISCHARGE NOTE PROVIDER - NSDCCPCAREPLAN_GEN_ALL_CORE_FT
PRINCIPAL DISCHARGE DIAGNOSIS  Diagnosis: Rhinovirus  Assessment and Plan of Treatment: You were found to have rhinovirus, which is the common cold. You were treated with supportive care. Your chest xray did not show any consolidation, which means that you do not have pneumonia. You were treated with antibiotics because of white blood cells in your urine. IF you experience any shortness of breath, productive cough with thick yellow/green sputum, fever, or chest pain, call 911 immediately.

## 2025-04-06 NOTE — DISCHARGE NOTE PROVIDER - HOSPITAL COURSE
HPI:  83F with PMH non-insulin dependent DM2, GERD, HTN, HLD, hypothyroidism, anxiety/depression who presents to the ED for bilateral lower extremity weakness and fatigue x 1 week now acutely worsening today. History obtained from [facility] ED documentation in physical chart - family unable to be contacted for collateral - patient also poor historian. Patient reportedly tried to use restroom yesterday morning at 3AM with her son's assistance and upon returning to her bed was unable to move her legs into bed and also developed a cough. Son also reportedly with vomiting, diarrhea, and weakness over the past week as well. She went to [facility] ED this morning and was worked up for code stroke - vitals at that time notable for tachycardia to low 100's and hypoxia to 89% on RA - she was subsequently placed on supplemental O2. Labs there notable for low magnesium that was repleted. CT scans were negative for intracranial pathology and CXR was negative for pneumonia - however patient and family were unhappy with care they received in the ED and requested transfer to [facility] - family opted to go with patient independently of hospital transport to [facility].    In [facility] ED patient found to be febrile to 101.7 - was given tylenol and BCX/UA/UCX obtained. Procal weakly positive 0.18. UA+, repeat CXR negative, given CTX for presumed UTI. Also given 1L bolus. (04 Apr 2025 01:58)    Hospital Course:  Pt was admitted to medicine, was treated with ceftriaxone x 3 days for positive UA, pt denied any dysuria. RVP positive for rhinovirus. Pts wbc and temp remained normal during admission, remained stable on RA. PT eval rec [facility]. Plan for DC to [facility].     Important Medication Changes and Reason:    Active or Pending Issues Requiring Follow-up:    The patient is afebrile, hemodynamically stable and medically optimized for discharge to [facility] with follow up with PCP. On day of discharge, patient is clinically stable with no new exam findings or acute symptoms compared to prior. The patient was seen by the attending physician on the date of discharge and deemed stable and acceptable for discharge. The patient's chronic medical conditions were treated accordingly per the patient's home medication regimen. The patient's medication reconciliation (with changes made to chronic medications), follow up appointments, discharge orders, instructions, and significant lab and diagnostic studies are as noted.  Advanced Directives:   [X] Full code  [ ] DNR  [ ] Hospice    Discharge Diagnoses:  Rhinovirus  Pyuria       HPI:  83F with PMH non-insulin dependent DM2, GERD, HTN, HLD, hypothyroidism, anxiety/depression who presents to the ED for bilateral lower extremity weakness and fatigue x 1 week now acutely worsening today. History obtained from [facility] ED documentation in physical chart - family unable to be contacted for collateral - patient also poor historian. Patient reportedly tried to use restroom yesterday morning at 3AM with her son's assistance and upon returning to her bed was unable to move her legs into bed and also developed a cough. Son also reportedly with vomiting, diarrhea, and weakness over the past week as well. She went to [facility] ED this morning and was worked up for code stroke - vitals at that time notable for tachycardia to low 100's and hypoxia to 89% on RA - she was subsequently placed on supplemental O2. Labs there notable for low magnesium that was repleted. CT scans were negative for intracranial pathology and CXR was negative for pneumonia - however patient and family were unhappy with care they received in the ED and requested transfer to [facility] - family opted to go with patient independently of hospital transport to [facility].    In [facility] ED patient found to be febrile to 101.7 - was given tylenol and BCX/UA/UCX obtained. Procal weakly positive 0.18. UA+, repeat CXR negative, given CTX for presumed UTI. Also given 1L bolus. (04 Apr 2025 01:58)    Hospital Course:  Pt was admitted to medicine, was treated with ceftriaxone x 3 days for positive UA, pt denied any dysuria. RVP positive for rhinovirus. Pts wbc and temp remained normal during admission, remained stable on RA. PT eval rec [facility]. Plan for DC to [facility].     Important Medication Changes and Reason:  None    Active or Pending Issues Requiring Follow-up:    The patient is afebrile, hemodynamically stable and medically optimized for discharge to [facility] with follow up with PCP. On day of discharge, patient is clinically stable with no new exam findings or acute symptoms compared to prior. The patient was seen by the attending physician on the date of discharge and deemed stable and acceptable for discharge. The patient's chronic medical conditions were treated accordingly per the patient's home medication regimen. The patient's medication reconciliation (with changes made to chronic medications), follow up appointments, discharge orders, instructions, and significant lab and diagnostic studies are as noted.  Advanced Directives:   [X] Full code  [ ] DNR  [ ] Hospice    Discharge Diagnoses:  Rhinovirus  Pyuria       HPI:  83F with PMH non-insulin dependent DM2, GERD, HTN, HLD, hypothyroidism, anxiety/depression who presents to the ED for bilateral lower extremity weakness and fatigue x 1 week now acutely worsening today. History obtained from [facility] ED documentation in physical chart - family unable to be contacted for collateral - patient also poor historian. Patient reportedly tried to use restroom yesterday morning at 3AM with her son's assistance and upon returning to her bed was unable to move her legs into bed and also developed a cough. Son also reportedly with vomiting, diarrhea, and weakness over the past week as well. She went to [facility] ED this morning and was worked up for code stroke - vitals at that time notable for tachycardia to low 100's and hypoxia to 89% on RA - she was subsequently placed on supplemental O2. Labs there notable for low magnesium that was repleted. CT scans were negative for intracranial pathology and CXR was negative for pneumonia - however patient and family were unhappy with care they received in the ED and requested transfer to [facility] - family opted to go with patient independently of hospital transport to [facility].    In [facility] ED patient found to be febrile to 101.7 - was given tylenol and BCX/UA/UCX obtained. Procal weakly positive 0.18. UA+, repeat CXR negative, given CTX for presumed UTI. Also given 1L bolus. (04 Apr 2025 01:58)    Hospital Course:  Pt was admitted to medicine, was treated with ceftriaxone x 3 days for positive UA, pt denied any dysuria. RVP positive for rhinovirus. Pts wbc and temp remained normal during admission, remained stable on RA. PT eval rec [facility]. Plan for DC to [facility].     Important Medication Changes and Reason:   None    Active or Pending Issues Requiring Follow-up:      The patient is afebrile, hemodynamically stable and medically optimized for discharge to [facility] with follow up with PCP. On day of discharge, patient is clinically stable with no new exam findings or acute symptoms compared to prior. The patient was seen by the attending physician on the date of discharge and deemed stable and acceptable for discharge. The patient's chronic medical conditions were treated accordingly per the patient's home medication regimen. The patient's medication reconciliation (with changes made to chronic medications), follow up appointments, discharge orders, instructions, and significant lab and diagnostic studies are as noted.  Advanced Directives:   [X] Full code  [ ] DNR  [ ] Hospice    Discharge Diagnoses:  Rhinovirus  Pyuria

## 2025-04-06 NOTE — DISCHARGE NOTE PROVIDER - NSDCCPTREATMENT_GEN_ALL_CORE_FT
PRINCIPAL PROCEDURE  Procedure: XR chest AP  Findings and Treatment: FINDINGS:  The heart size is normal.  The lungs are clear.  There is no pneumothorax or pleural effusion.  IMPRESSION:  Clear lungs.

## 2025-04-06 NOTE — PROGRESS NOTE ADULT - PROBLEM SELECTOR PLAN 4
-pt on metformin, jardiance, januvia  -hold oral meds   sliding scale with meals and at bedtime with fingersticks.  - A1C 7.9  - FS currently in acceptable range, goal FS <180 -pt on metformin, jardiance, januvia  -hold home oral dm2 meds   sliding scale with meals and at bedtime with fingersticks.  - A1C 7.9  - FS currently in acceptable range, goal FS <180

## 2025-04-06 NOTE — DISCHARGE NOTE PROVIDER - CARE PROVIDERS DIRECT ADDRESSES
,DirectAddress_Unknown,[email or web address]

## 2025-04-06 NOTE — PROGRESS NOTE ADULT - SUBJECTIVE AND OBJECTIVE BOX
***************************************************************  Baljinder Bird, PGY 1   Internal Medicine   ***************************************************************    GLEN RODRIGUEZ  84y  MRN: [id number]    Patient is a 84y old  Female who presents with a chief complaint of Weakness, UTI  Per chart, Pt is 83F with PMH prior CVA (old R basal ganglia infarct on prior CTH),  non-insulin dependent DM2, GERD, HTN, HLD, hypothyroidism, anxiety/depression who presents to the ED for fatigue and cough x 1-2 weeks, found to have entero/rhinovirus on RVP and pos UA.    (05 Apr 2025 14:00)      Subjective: no events ON. Denies fever, CP, SOB, abn pain, N/V, dysuria. Tolerating diet.      MEDICATIONS  (STANDING):  atorvastatin 40 milliGRAM(s) Oral at bedtime  busPIRone 10 milliGRAM(s) Oral two times a day  chlorhexidine 2% Cloths 1 Application(s) Topical daily  dextrose 5%. 1000 milliLiter(s) (100 mL/Hr) IV Continuous <Continuous>  dextrose 5%. 1000 milliLiter(s) (50 mL/Hr) IV Continuous <Continuous>  dextrose 50% Injectable 25 Gram(s) IV Push once  dextrose 50% Injectable 12.5 Gram(s) IV Push once  dextrose 50% Injectable 25 Gram(s) IV Push once  enoxaparin Injectable 40 milliGRAM(s) SubCutaneous every 24 hours  famotidine    Tablet 20 milliGRAM(s) Oral two times a day  gabapentin 400 milliGRAM(s) Oral at bedtime  glucagon  Injectable 1 milliGRAM(s) IntraMuscular once  guaiFENesin ER 1200 milliGRAM(s) Oral every 12 hours  influenza  Vaccine (HIGH DOSE) 0.5 milliLiter(s) IntraMuscular once  insulin lispro (ADMELOG) corrective regimen sliding scale   SubCutaneous three times a day before meals  insulin lispro (ADMELOG) corrective regimen sliding scale   SubCutaneous at bedtime  levothyroxine 25 MICROGram(s) Oral daily  sodium chloride 0.65% Nasal 1 Spray(s) Both Nostrils two times a day  sodium chloride 3%  Inhalation 4 milliLiter(s) Inhalation every 12 hours  venlafaxine XR. 225 milliGRAM(s) Oral daily    MEDICATIONS  (PRN):  acetaminophen     Tablet .. 650 milliGRAM(s) Oral every 6 hours PRN Temp greater or equal to 38C (100.4F), Mild Pain (1 - 3)  aluminum hydroxide/magnesium hydroxide/simethicone Suspension 30 milliLiter(s) Oral every 4 hours PRN Dyspepsia  benzonatate 100 milliGRAM(s) Oral three times a day PRN Cough  dextrose Oral Gel 15 Gram(s) Oral once PRN Blood Glucose LESS THAN 70 milliGRAM(s)/deciliter  melatonin 3 milliGRAM(s) Oral at bedtime PRN Insomnia  ondansetron Injectable 4 milliGRAM(s) IV Push every 8 hours PRN Nausea and/or Vomiting      Objective:    Vitals: Vital Signs Last 24 Hrs  T(C): 36.7 (04-06-25 @ 05:53), Max: 36.7 (04-06-25 @ 05:53)  T(F): 98 (04-06-25 @ 05:53), Max: 98 (04-06-25 @ 05:53)  HR: 65 (04-06-25 @ 05:53) (65 - 87)  BP: 117/56 (04-06-25 @ 05:53) (117/56 - 131/78)  BP(mean): --  RR: 18 (04-06-25 @ 05:53) (17 - 18)  SpO2: 97% (04-06-25 @ 05:53) (94% - 98%)            I&O's Summary    05 Apr 2025 07:01  -  06 Apr 2025 07:00  --------------------------------------------------------  IN: 0 mL / OUT: 1050 mL / NET: -1050 mL        PHYSICAL EXAM:  GENERAL: NAD  HEAD:  Atraumatic, Normocephalic  EYES: EOMI, conjunctiva and sclera clear  CHEST/LUNG: Clear to auscultation bilaterally; No rales, rhonchi, wheezing, or rubs  HEART: Regular rate and rhythm; No murmurs, rubs, or gallops  ABDOMEN: Soft, Nontender, Nondistended;   SKIN: No rashes or lesions  NERVOUS SYSTEM:  Alert & Oriented X3, no focal deficits    LABS:  04-05    138  |  104  |  14  ----------------------------<  116[H]  3.6   |  21[L]  |  0.94  04-04    140  |  105  |  14  ----------------------------<  117[H]  3.7   |  23  |  0.98  04-03    137  |  98  |  16  ----------------------------<  142[H]  3.8   |  21[L]  |  0.99    Ca    8.9      05 Apr 2025 06:50  Ca    8.6      04 Apr 2025 06:46  Ca    8.9      03 Apr 2025 23:50  Phos  3.0     04-05  Mg     2.10     04-05    TPro  6.4  /  Alb  3.3  /  TBili  0.2  /  DBili  x   /  AST  23  /  ALT  13  /  AlkPhos  51  04-05  TPro  6.2  /  Alb  3.4  /  TBili  0.5  /  DBili  x   /  AST  19  /  ALT  13  /  AlkPhos  50  04-04  TPro  6.9  /  Alb  3.8  /  TBili  0.7  /  DBili  x   /  AST  19  /  ALT  15  /  AlkPhos  59  04-03                    Urinalysis Basic - ( 05 Apr 2025 06:50 )    Color: x / Appearance: x / SG: x / pH: x  Gluc: 116 mg/dL / Ketone: x  / Bili: x / Urobili: x   Blood: x / Protein: x / Nitrite: x   Leuk Esterase: x / RBC: x / WBC x   Sq Epi: x / Non Sq Epi: x / Bacteria: x                              11.7   6.35  )-----------( 212      ( 06 Apr 2025 05:49 )             37.2                         10.9   7.75  )-----------( 210      ( 05 Apr 2025 06:50 )             34.2                         10.5   9.20  )-----------( 201      ( 04 Apr 2025 06:46 )             33.6     CAPILLARY BLOOD GLUCOSE      POCT Blood Glucose.: 132 mg/dL (05 Apr 2025 21:44)  POCT Blood Glucose.: 116 mg/dL (05 Apr 2025 17:30)  POCT Blood Glucose.: 148 mg/dL (05 Apr 2025 11:59)  POCT Blood Glucose.: 109 mg/dL (05 Apr 2025 08:13)      RADIOLOGY & ADDITIONAL TESTS:    Imaging Personally Reviewed:  [x ] YES  [ ] NO    Consultants involved in case:   Consultant(s) Notes Reviewed:  [ x] YES  [ ] NO:   Care Discussed with Consultants/Other Providers [x ] YES  [ ] NO         ***************************************************************  Baljinder Bird, PGY 1   Internal Medicine   ***************************************************************    GLEN RODRIGUEZ  84y  MRN: [id number]    Patient is a 84y old  Female who presents with a chief complaint of Weakness, UTI  Per chart, Pt is 83F with PMH prior CVA (old R basal ganglia infarct on prior CTH),  non-insulin dependent DM2, GERD, HTN, HLD, hypothyroidism, anxiety/depression who presents to the ED for fatigue and cough x 1-2 weeks, found to have entero/rhinovirus on RVP and pos UA.    (05 Apr 2025 14:00)      Subjective: no events ON. Pt reports improving mildly productive cough, mild amounts of clear light yellow sputum. No hemoptysis. Denies fever, CP, SOB, abn pain, N/V, dysuria. Tolerating diet.      MEDICATIONS  (STANDING):  atorvastatin 40 milliGRAM(s) Oral at bedtime  busPIRone 10 milliGRAM(s) Oral two times a day  chlorhexidine 2% Cloths 1 Application(s) Topical daily  dextrose 5%. 1000 milliLiter(s) (100 mL/Hr) IV Continuous <Continuous>  dextrose 5%. 1000 milliLiter(s) (50 mL/Hr) IV Continuous <Continuous>  dextrose 50% Injectable 25 Gram(s) IV Push once  dextrose 50% Injectable 12.5 Gram(s) IV Push once  dextrose 50% Injectable 25 Gram(s) IV Push once  enoxaparin Injectable 40 milliGRAM(s) SubCutaneous every 24 hours  famotidine    Tablet 20 milliGRAM(s) Oral two times a day  gabapentin 400 milliGRAM(s) Oral at bedtime  glucagon  Injectable 1 milliGRAM(s) IntraMuscular once  guaiFENesin ER 1200 milliGRAM(s) Oral every 12 hours  influenza  Vaccine (HIGH DOSE) 0.5 milliLiter(s) IntraMuscular once  insulin lispro (ADMELOG) corrective regimen sliding scale   SubCutaneous three times a day before meals  insulin lispro (ADMELOG) corrective regimen sliding scale   SubCutaneous at bedtime  levothyroxine 25 MICROGram(s) Oral daily  sodium chloride 0.65% Nasal 1 Spray(s) Both Nostrils two times a day  sodium chloride 3%  Inhalation 4 milliLiter(s) Inhalation every 12 hours  venlafaxine XR. 225 milliGRAM(s) Oral daily    MEDICATIONS  (PRN):  acetaminophen     Tablet .. 650 milliGRAM(s) Oral every 6 hours PRN Temp greater or equal to 38C (100.4F), Mild Pain (1 - 3)  aluminum hydroxide/magnesium hydroxide/simethicone Suspension 30 milliLiter(s) Oral every 4 hours PRN Dyspepsia  benzonatate 100 milliGRAM(s) Oral three times a day PRN Cough  dextrose Oral Gel 15 Gram(s) Oral once PRN Blood Glucose LESS THAN 70 milliGRAM(s)/deciliter  melatonin 3 milliGRAM(s) Oral at bedtime PRN Insomnia  ondansetron Injectable 4 milliGRAM(s) IV Push every 8 hours PRN Nausea and/or Vomiting      Objective:    Vitals: Vital Signs Last 24 Hrs  T(C): 36.7 (04-06-25 @ 05:53), Max: 36.7 (04-06-25 @ 05:53)  T(F): 98 (04-06-25 @ 05:53), Max: 98 (04-06-25 @ 05:53)  HR: 65 (04-06-25 @ 05:53) (65 - 87)  BP: 117/56 (04-06-25 @ 05:53) (117/56 - 131/78)  BP(mean): --  RR: 18 (04-06-25 @ 05:53) (17 - 18)  SpO2: 97% (04-06-25 @ 05:53) (94% - 98%)            I&O's Summary    05 Apr 2025 07:01  -  06 Apr 2025 07:00  --------------------------------------------------------  IN: 0 mL / OUT: 1050 mL / NET: -1050 mL        PHYSICAL EXAM:  GENERAL: NAD  HEAD:  Atraumatic, Normocephalic  EYES: EOMI, conjunctiva and sclera clear  CHEST/LUNG: Clear to auscultation bilaterally; No rales, rhonchi, wheezing, or rubs  HEART: Regular rate and rhythm; No murmurs, rubs, or gallops  ABDOMEN: Soft, Nontender, Nondistended;   SKIN: No rashes or lesions  NERVOUS SYSTEM:  Alert & Oriented X person and place. Equal strength of UE/LE. Raises each leg off bed, ankle strength intact.     LABS:  04-05    138  |  104  |  14  ----------------------------<  116[H]  3.6   |  21[L]  |  0.94  04-04    140  |  105  |  14  ----------------------------<  117[H]  3.7   |  23  |  0.98  04-03    137  |  98  |  16  ----------------------------<  142[H]  3.8   |  21[L]  |  0.99    Ca    8.9      05 Apr 2025 06:50  Ca    8.6      04 Apr 2025 06:46  Ca    8.9      03 Apr 2025 23:50  Phos  3.0     04-05  Mg     2.10     04-05    TPro  6.4  /  Alb  3.3  /  TBili  0.2  /  DBili  x   /  AST  23  /  ALT  13  /  AlkPhos  51  04-05  TPro  6.2  /  Alb  3.4  /  TBili  0.5  /  DBili  x   /  AST  19  /  ALT  13  /  AlkPhos  50  04-04  TPro  6.9  /  Alb  3.8  /  TBili  0.7  /  DBili  x   /  AST  19  /  ALT  15  /  AlkPhos  59  04-03                    Urinalysis Basic - ( 05 Apr 2025 06:50 )    Color: x / Appearance: x / SG: x / pH: x  Gluc: 116 mg/dL / Ketone: x  / Bili: x / Urobili: x   Blood: x / Protein: x / Nitrite: x   Leuk Esterase: x / RBC: x / WBC x   Sq Epi: x / Non Sq Epi: x / Bacteria: x                              11.7   6.35  )-----------( 212      ( 06 Apr 2025 05:49 )             37.2                         10.9   7.75  )-----------( 210      ( 05 Apr 2025 06:50 )             34.2                         10.5   9.20  )-----------( 201      ( 04 Apr 2025 06:46 )             33.6     CAPILLARY BLOOD GLUCOSE      POCT Blood Glucose.: 132 mg/dL (05 Apr 2025 21:44)  POCT Blood Glucose.: 116 mg/dL (05 Apr 2025 17:30)  POCT Blood Glucose.: 148 mg/dL (05 Apr 2025 11:59)  POCT Blood Glucose.: 109 mg/dL (05 Apr 2025 08:13)      RADIOLOGY & ADDITIONAL TESTS:    Imaging Personally Reviewed:  [x ] YES  [ ] NO    Consultants involved in case:   Consultant(s) Notes Reviewed:  [ x] YES  [ ] NO:   Care Discussed with Consultants/Other Providers [x ] YES  [ ] NO

## 2025-04-06 NOTE — DISCHARGE NOTE PROVIDER - NSDCFUADDAPPT_GEN_ALL_CORE_FT
APPTS ARE READY TO BE MADE: [ ] YES    Best Family or Patient Contact (if needed):    Additional Information about above appointments (if needed):    1: PCP nathalie  2: Dr Hackett transitions of care televisit  3:     Other comments or requests:    APPTS ARE READY TO BE MADE: [X] YES    Best Family or Patient Contact (if needed):    Additional Information about above appointments (if needed):    1: PCP nathalie  2: Dr Hackett transitions of care televisit  3:     Other comments or requests:    APPTS ARE READY TO BE MADE: [X] YES    Best Family or Patient Contact (if needed):    Additional Information about above appointments (if needed):    1: PCP nathalie  2: Dr Hackett transitions of care televisit  3:     Other comments or requests:       [facility] Caregiver will arrange follow up.

## 2025-04-06 NOTE — DISCHARGE NOTE PROVIDER - NSDCMRMEDTOKEN_GEN_ALL_CORE_FT
atorvastatin 40 mg oral tablet: 1 tab(s) orally once a day (at bedtime)  busPIRone 10 mg oral tablet: 1 tab(s) orally 2 times a day (11am and 5pm)  famotidine 20 mg oral tablet: 1 tab(s) orally once a day  gabapentin 400 mg oral capsule: 1 cap(s) orally once a day (at bedtime)  Januvia 100 mg oral tablet: 1 tab(s) orally once a day  Jardiance 25 mg oral tablet: 1 tab(s) orally once a day  levothyroxine 25 mcg (0.025 mg) oral tablet: 1 tab(s) orally once a day  losartan 25 mg oral tablet: 1 tab(s) orally once a day  metFORMIN 1000 mg oral tablet: 1 tab(s) orally 2 times a day  venlafaxine 225 mg oral tablet, extended release: 1 tab(s) orally once a day

## 2025-04-06 NOTE — PROGRESS NOTE ADULT - PROBLEM SELECTOR PLAN 2
pt endorsing progressive lower extremity weakness x 1 week w/ associated fatigue, more consistent with whole body generalized weakness on exam  old R basal ganglia infarct on prior CTH  -i/s/o infection - etiology include UTI and URI    Pt had prior admission in 10/2024 for UTI with similar presentation of fatigue and generalized weakness.  Low magnesium at [facility] ED.  +entero/rhinovirus  UA with +blood and WBC  PT consult: rec [facility]    Plan:  -f/u UCx and infectious workup as above pt endorsing progressive lower extremity weakness x 1 week w/ associated fatigue, more consistent with whole body generalized weakness on exam  old R basal ganglia infarct on prior CTH  -i/s/o infection - etiology include UTI and URI    Pt had prior admission in 10/2024 for UTI with similar presentation of fatigue and generalized weakness.  Low magnesium at [facility] ED.  +entero/rhinovirus  UA with +blood and WBC,     urine cx ngtd  blood cx ngtd  completed course of cftx x 3days  PT consult: rec [facility]

## 2025-04-06 NOTE — DISCHARGE NOTE PROVIDER - ATTENDING DISCHARGE PHYSICAL EXAMINATION:
Pt seen at bedside. Reports feeling tired, but no other issues.  Lungs CTA, RRR, Abd NT/ND, awake and alert.  Stable for DC to [facility]

## 2025-04-07 LAB
GLUCOSE BLDC GLUCOMTR-MCNC: 147 MG/DL — HIGH (ref 70–99)
GLUCOSE BLDC GLUCOMTR-MCNC: 151 MG/DL — HIGH (ref 70–99)
GLUCOSE BLDC GLUCOMTR-MCNC: 228 MG/DL — HIGH (ref 70–99)

## 2025-04-07 PROCEDURE — 99232 SBSQ HOSP IP/OBS MODERATE 35: CPT | Mod: GC

## 2025-04-07 RX ORDER — LOSARTAN POTASSIUM 100 MG/1
25 TABLET, FILM COATED ORAL DAILY
Refills: 0 | Status: DISCONTINUED | OUTPATIENT
Start: 2025-04-07 | End: 2025-04-09

## 2025-04-07 RX ADMIN — VENLAFAXINE HYDROCHLORIDE 225 MILLIGRAM(S): 37.5 CAPSULE, EXTENDED RELEASE ORAL at 11:39

## 2025-04-07 RX ADMIN — ENOXAPARIN SODIUM 40 MILLIGRAM(S): 100 INJECTION SUBCUTANEOUS at 06:08

## 2025-04-07 RX ADMIN — Medication 20 MILLIGRAM(S): at 17:23

## 2025-04-07 RX ADMIN — INSULIN LISPRO 1: 100 INJECTION, SOLUTION INTRAVENOUS; SUBCUTANEOUS at 12:19

## 2025-04-07 RX ADMIN — ATORVASTATIN CALCIUM 40 MILLIGRAM(S): 80 TABLET, FILM COATED ORAL at 21:32

## 2025-04-07 RX ADMIN — BUSPIRONE HYDROCHLORIDE 10 MILLIGRAM(S): 15 TABLET ORAL at 06:09

## 2025-04-07 RX ADMIN — Medication 25 MICROGRAM(S): at 06:09

## 2025-04-07 RX ADMIN — Medication 4 MILLILITER(S): at 08:01

## 2025-04-07 RX ADMIN — DEXTROMETHORPHAN HBR, GUAIFENESIN 1200 MILLIGRAM(S): 200 LIQUID ORAL at 17:23

## 2025-04-07 RX ADMIN — Medication 1 APPLICATION(S): at 11:38

## 2025-04-07 RX ADMIN — Medication 1 SPRAY(S): at 17:25

## 2025-04-07 RX ADMIN — Medication 4 MILLILITER(S): at 21:35

## 2025-04-07 RX ADMIN — BUSPIRONE HYDROCHLORIDE 10 MILLIGRAM(S): 15 TABLET ORAL at 17:23

## 2025-04-07 RX ADMIN — GABAPENTIN 400 MILLIGRAM(S): 400 CAPSULE ORAL at 22:03

## 2025-04-07 RX ADMIN — Medication 20 MILLIGRAM(S): at 06:10

## 2025-04-07 RX ADMIN — Medication 1 SPRAY(S): at 06:10

## 2025-04-07 RX ADMIN — DEXTROMETHORPHAN HBR, GUAIFENESIN 1200 MILLIGRAM(S): 200 LIQUID ORAL at 06:08

## 2025-04-07 NOTE — PROGRESS NOTE ADULT - PROBLEM SELECTOR PLAN 4
-pt on metformin, jardiance, januvia  -hold home oral dm2 meds   sliding scale with meals and at bedtime with fingersticks.  - A1C 7.9  - FS currently in acceptable range, goal FS <180

## 2025-04-07 NOTE — PROGRESS NOTE ADULT - PROBLEM SELECTOR PLAN 3
-pt febrile to 101.7 in ED with associated cough and weakness  Leukocytosis on arrival in ED here, now resolving  -UA grossly positive for WBC/blood, negative bacteria but positive for yeast-like cells, repeat without yeast cells, possible skin contaminant    blood cx ngtd  urine cx ngtd

## 2025-04-07 NOTE — PROGRESS NOTE ADULT - PROBLEM SELECTOR PLAN 2
pt endorsing progressive lower extremity weakness x 1 week w/ associated fatigue, more consistent with whole body generalized weakness on exam  old R basal ganglia infarct on prior CTH  -i/s/o infection - etiology include UTI and URI    Pt had prior admission in 10/2024 for UTI with similar presentation of fatigue and generalized weakness.  Low magnesium at [facility] ED.  +entero/rhinovirus  UA with +blood and WBC,     urine cx ngtd  blood cx ngtd  completed course of cftx x 3days  PT consult: rec [facility]

## 2025-04-07 NOTE — PROGRESS NOTE ADULT - SUBJECTIVE AND OBJECTIVE BOX
Teams Marcelino Santizo PGY 1 MD    SUBJECTIVE / OVERNIGHT EVENTS:  - Pt seen and examined at bedside  - GUILLE    MEDICATIONS  (STANDING):  atorvastatin 40 milliGRAM(s) Oral at bedtime  busPIRone 10 milliGRAM(s) Oral two times a day  chlorhexidine 2% Cloths 1 Application(s) Topical daily  dextrose 5%. 1000 milliLiter(s) (100 mL/Hr) IV Continuous <Continuous>  dextrose 5%. 1000 milliLiter(s) (50 mL/Hr) IV Continuous <Continuous>  dextrose 50% Injectable 25 Gram(s) IV Push once  dextrose 50% Injectable 12.5 Gram(s) IV Push once  dextrose 50% Injectable 25 Gram(s) IV Push once  enoxaparin Injectable 40 milliGRAM(s) SubCutaneous every 24 hours  famotidine    Tablet 20 milliGRAM(s) Oral two times a day  gabapentin 400 milliGRAM(s) Oral at bedtime  glucagon  Injectable 1 milliGRAM(s) IntraMuscular once  guaiFENesin ER 1200 milliGRAM(s) Oral every 12 hours  influenza  Vaccine (HIGH DOSE) 0.5 milliLiter(s) IntraMuscular once  insulin lispro (ADMELOG) corrective regimen sliding scale   SubCutaneous three times a day before meals  insulin lispro (ADMELOG) corrective regimen sliding scale   SubCutaneous at bedtime  levothyroxine 25 MICROGram(s) Oral daily  sodium chloride 0.65% Nasal 1 Spray(s) Both Nostrils two times a day  sodium chloride 3%  Inhalation 4 milliLiter(s) Inhalation every 12 hours  venlafaxine XR. 225 milliGRAM(s) Oral daily    MEDICATIONS  (PRN):  acetaminophen     Tablet .. 650 milliGRAM(s) Oral every 6 hours PRN Temp greater or equal to 38C (100.4F), Mild Pain (1 - 3)  aluminum hydroxide/magnesium hydroxide/simethicone Suspension 30 milliLiter(s) Oral every 4 hours PRN Dyspepsia  benzonatate 100 milliGRAM(s) Oral three times a day PRN Cough  dextrose Oral Gel 15 Gram(s) Oral once PRN Blood Glucose LESS THAN 70 milliGRAM(s)/deciliter  melatonin 3 milliGRAM(s) Oral at bedtime PRN Insomnia  ondansetron Injectable 4 milliGRAM(s) IV Push every 8 hours PRN Nausea and/or Vomiting          PHYSICAL EXAM:  Vital Signs Last 24 Hrs  T(C): 36.7 (07 Apr 2025 05:29), Max: 36.7 (06 Apr 2025 13:12)  T(F): 98.1 (07 Apr 2025 05:29), Max: 98.1 (07 Apr 2025 05:29)  HR: 70 (07 Apr 2025 05:29) (66 - 94)  BP: 148/70 (07 Apr 2025 05:29) (116/78 - 148/70)  BP(mean): --  RR: 17 (07 Apr 2025 05:29) (17 - 17)  SpO2: 100% (07 Apr 2025 05:29) (97% - 100%)    Parameters below as of 07 Apr 2025 05:29  Patient On (Oxygen Delivery Method): room air        CAPILLARY BLOOD GLUCOSE      POCT Blood Glucose.: 142 mg/dL (07 Apr 2025 08:21)  POCT Blood Glucose.: 181 mg/dL (06 Apr 2025 21:20)  POCT Blood Glucose.: 150 mg/dL (06 Apr 2025 17:31)  POCT Blood Glucose.: 183 mg/dL (06 Apr 2025 12:01)    I&O's Summary      PHYSICAL EXAM:  GENERAL: NAD  HEAD:  Atraumatic, Normocephalic  EYES: EOMI, conjunctiva and sclera clear  CHEST/LUNG: Clear to auscultation bilaterally; No rales, rhonchi, wheezing, or rubs  HEART: Regular rate and rhythm; No murmurs, rubs, or gallops  ABDOMEN: Soft, Nontender, Nondistended;   SKIN: No rashes or lesions  NERVOUS SYSTEM:  Alert & Oriented X person and place. Equal strength of UE/LE. Raises each leg off bed, ankle strength intact.     LABS:                        11.7   6.35  )-----------( 212      ( 06 Apr 2025 05:49 )             37.2     04-06    138  |  103  |  18  ----------------------------<  121[H]  3.7   |  20[L]  |  0.98    Ca    9.0      06 Apr 2025 05:49  Phos  3.2     04-06  Mg     2.00     04-06    TPro  6.5  /  Alb  3.5  /  TBili  0.3  /  DBili  x   /  AST  21  /  ALT  16  /  AlkPhos  51  04-06          Urinalysis Basic - ( 06 Apr 2025 05:49 )    Color: x / Appearance: x / SG: x / pH: x  Gluc: 121 mg/dL / Ketone: x  / Bili: x / Urobili: x   Blood: x / Protein: x / Nitrite: x   Leuk Esterase: x / RBC: x / WBC x   Sq Epi: x / Non Sq Epi: x / Bacteria: x          IMAGING:    [X] All pertinent imaging reviewed by me

## 2025-04-08 LAB
GLUCOSE BLDC GLUCOMTR-MCNC: 156 MG/DL — HIGH (ref 70–99)
GLUCOSE BLDC GLUCOMTR-MCNC: 169 MG/DL — HIGH (ref 70–99)
GLUCOSE BLDC GLUCOMTR-MCNC: 173 MG/DL — HIGH (ref 70–99)
GLUCOSE BLDC GLUCOMTR-MCNC: 224 MG/DL — HIGH (ref 70–99)

## 2025-04-08 PROCEDURE — 99232 SBSQ HOSP IP/OBS MODERATE 35: CPT

## 2025-04-08 RX ADMIN — Medication 20 MILLIGRAM(S): at 07:27

## 2025-04-08 RX ADMIN — Medication 1 APPLICATION(S): at 11:08

## 2025-04-08 RX ADMIN — INSULIN LISPRO 1: 100 INJECTION, SOLUTION INTRAVENOUS; SUBCUTANEOUS at 17:25

## 2025-04-08 RX ADMIN — GABAPENTIN 400 MILLIGRAM(S): 400 CAPSULE ORAL at 21:20

## 2025-04-08 RX ADMIN — Medication 20 MILLIGRAM(S): at 17:22

## 2025-04-08 RX ADMIN — INSULIN LISPRO 2: 100 INJECTION, SOLUTION INTRAVENOUS; SUBCUTANEOUS at 12:23

## 2025-04-08 RX ADMIN — DEXTROMETHORPHAN HBR, GUAIFENESIN 1200 MILLIGRAM(S): 200 LIQUID ORAL at 17:23

## 2025-04-08 RX ADMIN — BUSPIRONE HYDROCHLORIDE 10 MILLIGRAM(S): 15 TABLET ORAL at 07:27

## 2025-04-08 RX ADMIN — Medication 4 MILLILITER(S): at 10:02

## 2025-04-08 RX ADMIN — ENOXAPARIN SODIUM 40 MILLIGRAM(S): 100 INJECTION SUBCUTANEOUS at 07:28

## 2025-04-08 RX ADMIN — LOSARTAN POTASSIUM 25 MILLIGRAM(S): 100 TABLET, FILM COATED ORAL at 07:27

## 2025-04-08 RX ADMIN — Medication 3 MILLIGRAM(S): at 21:22

## 2025-04-08 RX ADMIN — Medication 1 SPRAY(S): at 07:28

## 2025-04-08 RX ADMIN — VENLAFAXINE HYDROCHLORIDE 225 MILLIGRAM(S): 37.5 CAPSULE, EXTENDED RELEASE ORAL at 11:08

## 2025-04-08 RX ADMIN — Medication 1 SPRAY(S): at 17:26

## 2025-04-08 RX ADMIN — Medication 25 MICROGRAM(S): at 06:13

## 2025-04-08 RX ADMIN — BUSPIRONE HYDROCHLORIDE 10 MILLIGRAM(S): 15 TABLET ORAL at 17:22

## 2025-04-08 RX ADMIN — DEXTROMETHORPHAN HBR, GUAIFENESIN 1200 MILLIGRAM(S): 200 LIQUID ORAL at 07:28

## 2025-04-08 RX ADMIN — ATORVASTATIN CALCIUM 40 MILLIGRAM(S): 80 TABLET, FILM COATED ORAL at 21:20

## 2025-04-08 RX ADMIN — Medication 100 MILLIGRAM(S): at 21:21

## 2025-04-08 RX ADMIN — Medication 4 MILLILITER(S): at 22:47

## 2025-04-08 RX ADMIN — INSULIN LISPRO 1: 100 INJECTION, SOLUTION INTRAVENOUS; SUBCUTANEOUS at 08:30

## 2025-04-08 NOTE — PROGRESS NOTE ADULT - PROBLEM SELECTOR PLAN 2
pt endorsing progressive lower extremity weakness x 1 week w/ associated fatigue, more consistent with whole body generalized weakness on exam  old R basal ganglia infarct on prior CTH  -i/s/o infection - etiology include UTI and URI    Pt had prior admission in 10/2024 for UTI with similar presentation of fatigue and generalized weakness.  Low magnesium at [facility] ED.  +entero/rhinovirus  UA with +blood and WBC,     urine cx ngtd  blood cx ngtd  completed course of cftx x 3days  PT consult: rec [facility]

## 2025-04-08 NOTE — PROGRESS NOTE ADULT - ATTENDING COMMENTS
83 yo F w/ HTN, HLD, DM2, hypothyroid, depression, presenting with cough/weakness, admitted with sepsis 2/2 entero/rhinovirus +/- UTI. Receiving empiric antibiotics and supportive care. PT recommending [facility].     Discussed pending urine culture results.  States she feels okay, denies acute complaints.  Apprehensive about going to rehab as she feels she will be there for a long time, but still agreeable.    # Sepsis (fever T38.7C /tachycardia HR >90) secondary to entero/rhinovirus +/- UTI  - sepsis resolved, respiratory status stable on room air  - c/w supportive care for viral infection, symptomatic management of cough prn  - on CTX for ?UTI, f/u cultures and tailor abx as appropriate  - dispo pending continued clinical improvement  - PT recs [facility]    # DM2 (A1c 7.9)  - hold home oral medications while inpatient  - c/w sliding scale coverage, current FS in acceptable range    # HLD  - on statin    # HTN  - losartan on hold, current BP in acceptable range, continue to reassess  - given age, would favor a more lenient BP goal to avoid falls/hypotension    # Hypothyroid  - c/w synthroid  - otpt f/u for repeat TFTs    # Depression  - on buspirone and venlafaxine ER  - appreciate medrec pharmacist assistance w/ med rec .
83 yo F w/ HTN, HLD, DM2, hypothyroid, depression, presenting with cough/weakness, admitted with sepsis 2/2 entero/rhinovirus +/- UTI. Receiving empiric antibiotics and supportive care. PT recommending [facility].     Denies acute complaints. Hesitant for mobility today, encouraged sitting up in and maybe OOBTC.    # Sepsis (fever T38.7C /tachycardia HR >90) secondary to entero/rhinovirus +/- UTI  - sepsis resolved, respiratory status stable on room air  - c/w supportive care for viral infection, symptomatic management of cough prn  - on CTX for ?UTI - Cx neg, completed course  - dispo pending continued clinical improvement  - PT recs [facility], re-eval as patient feels she can manage at home    # DM2 (A1c 7.9)  - hold home oral medications while inpatient  - c/w sliding scale coverage, current FS in acceptable range    # HLD  - on statin    # HTN  - losartan on hold, current BP in acceptable range, continue to reassess  - given age, would favor a more lenient BP goal to avoid falls/hypotension    # Hypothyroid  - c/w synthroid  - otpt f/u for repeat TFTs    # Depression  - on buspirone and venlafaxine ER
85 yo F w/ HTN, HLD, DM2, hypothyroid, depression, presenting with cough/weakness, admitted with sepsis 2/2 entero/rhinovirus +/- UTI. PT recommending [facility].         # Sepsis (fever T38.7C /tachycardia HR >90) secondary to entero/rhinovirus +/- UTI  - sepsis resolved, respiratory status stable on room air  - c/w supportive care for viral infection, symptomatic management of cough prn  - on CTX for ?UTI - Cx neg, completed course  - PT recs [facility],    # DM2 (A1c 7.9)  - hold home oral medications while inpatient  - c/w sliding scale coverage, current FS in acceptable range    # HLD  - on statin    # HTN  - losartan on hold, current BP in acceptable range, continue to reassess  - given age, would favor a more lenient BP goal to avoid falls/hypotension    # Hypothyroid  - c/w synthroid  - otpt f/u for repeat TFTs    # Depression  - on buspirone and venlafaxine ER    [facility] placement pending, awaiting auth
85 yo F w/ HTN, HLD, DM2, hypothyroid, depression, presenting with cough/weakness, admitted with sepsis 2/2 entero/rhinovirus +/- UTI. Receiving empiric antibiotics and supportive care. PT recommending [facility].     Denies acute complaints. Hesitant for mobility today, encouraged sitting up in and maybe OOBTC.    # Sepsis (fever T38.7C /tachycardia HR >90) secondary to entero/rhinovirus +/- UTI  - sepsis resolved, respiratory status stable on room air  - c/w supportive care for viral infection, symptomatic management of cough prn  - on CTX for ?UTI - Cx neg, completed course  - dispo pending continued clinical improvement  - PT recs [facility]    # DM2 (A1c 7.9)  - hold home oral medications while inpatient  - c/w sliding scale coverage, current FS in acceptable range    # HLD  - on statin    # HTN  - losartan on hold, current BP in acceptable range, continue to reassess  - given age, would favor a more lenient BP goal to avoid falls/hypotension    # Hypothyroid  - c/w synthroid  - otpt f/u for repeat TFTs    # Depression  - on buspirone and venlafaxine ER  - appreciate medrec pharmacist assistance w/ med rec .
Patient seen and examined, d/w Dr. Pang, agree w/ above.     85 yo F w/ HTN, HLD, DM2, hypothyroid, depression, presenting with cough/weakness, admitted with sepsis 2/2 entero/rhinovirus +/- UTI. Receiving empiric antibiotics and supportive care. PT recommending [facility].     Patient about to eat lunch, reports feeling so-so. Experiencing intermittent cough. Respiratory status stable on RA. Discussed RVP results, plan for supportive care, on antibiotics pending culture data. Patient in agreement with plan, no further questions at this time.     # Sepsis (fever T38.7C /tachycardia HR >90) secondary to entero/rhinovirus +/- UTI  - sepsis resolved, respiratory status stable on room air  - c/w supportive care for viral infection, symptomatic management of cough prn  - on CTX for ?UTI, f/u cultures and tailor abx as appropriate  - dispo pending continued clinical improvement  - PT recs [facility]    # DM2 (A1c 7.9)  - hold home oral medications while inpatient  - c/w sliding scale coverage, current FS in acceptable range    # HLD  - on statin    # HTN  - losartan on hold, current BP in acceptable range, continue to reassess  - given age, would favor a more lenient BP goal to avoid falls/hypotension    # Hypothyroid  - c/w synthroid  - otpt f/u for repeat TFTs    # Depression  - on buspirone and venlafaxine ER  - appreciate medrec pharmacist assistance w/ med rec

## 2025-04-08 NOTE — PROGRESS NOTE ADULT - PROBLEM SELECTOR PLAN 5
-pt on losartan outpatient, will hold i/s/o infection with soft BPs. -pt on losartan outpatient,   plan  C/w Home losartan

## 2025-04-08 NOTE — PROGRESS NOTE ADULT - SUBJECTIVE AND OBJECTIVE BOX
Teams Marcelino Santizo PGY 1 MD    SUBJECTIVE / OVERNIGHT EVENTS:  - Pt seen and examined at bedside  - GUILLE    MEDICATIONS  (STANDING):  atorvastatin 40 milliGRAM(s) Oral at bedtime  busPIRone 10 milliGRAM(s) Oral two times a day  chlorhexidine 2% Cloths 1 Application(s) Topical daily  dextrose 5%. 1000 milliLiter(s) (100 mL/Hr) IV Continuous <Continuous>  dextrose 5%. 1000 milliLiter(s) (50 mL/Hr) IV Continuous <Continuous>  dextrose 50% Injectable 25 Gram(s) IV Push once  dextrose 50% Injectable 12.5 Gram(s) IV Push once  dextrose 50% Injectable 25 Gram(s) IV Push once  enoxaparin Injectable 40 milliGRAM(s) SubCutaneous every 24 hours  famotidine    Tablet 20 milliGRAM(s) Oral two times a day  gabapentin 400 milliGRAM(s) Oral at bedtime  glucagon  Injectable 1 milliGRAM(s) IntraMuscular once  guaiFENesin ER 1200 milliGRAM(s) Oral every 12 hours  influenza  Vaccine (HIGH DOSE) 0.5 milliLiter(s) IntraMuscular once  insulin lispro (ADMELOG) corrective regimen sliding scale   SubCutaneous three times a day before meals  insulin lispro (ADMELOG) corrective regimen sliding scale   SubCutaneous at bedtime  levothyroxine 25 MICROGram(s) Oral daily  losartan 25 milliGRAM(s) Oral daily  sodium chloride 0.65% Nasal 1 Spray(s) Both Nostrils two times a day  sodium chloride 3%  Inhalation 4 milliLiter(s) Inhalation every 12 hours  venlafaxine XR. 225 milliGRAM(s) Oral daily    MEDICATIONS  (PRN):  acetaminophen     Tablet .. 650 milliGRAM(s) Oral every 6 hours PRN Temp greater or equal to 38C (100.4F), Mild Pain (1 - 3)  aluminum hydroxide/magnesium hydroxide/simethicone Suspension 30 milliLiter(s) Oral every 4 hours PRN Dyspepsia  benzonatate 100 milliGRAM(s) Oral three times a day PRN Cough  dextrose Oral Gel 15 Gram(s) Oral once PRN Blood Glucose LESS THAN 70 milliGRAM(s)/deciliter  melatonin 3 milliGRAM(s) Oral at bedtime PRN Insomnia  ondansetron Injectable 4 milliGRAM(s) IV Push every 8 hours PRN Nausea and/or Vomiting        04-07-25 @ 07:01  -  04-08-25 @ 07:00  --------------------------------------------------------  IN: 500 mL / OUT: 1300 mL / NET: -800 mL        PHYSICAL EXAM:  Vital Signs Last 24 Hrs  T(C): 36.4 (08 Apr 2025 05:18), Max: 36.8 (07 Apr 2025 21:28)  T(F): 97.5 (08 Apr 2025 05:18), Max: 98.2 (07 Apr 2025 21:28)  HR: 72 (08 Apr 2025 10:02) (62 - 72)  BP: 122/61 (08 Apr 2025 07:18) (121/60 - 140/61)  BP(mean): --  RR: 17 (08 Apr 2025 05:18) (17 - 18)  SpO2: 91% (08 Apr 2025 10:02) (91% - 96%)    Parameters below as of 08 Apr 2025 10:02  Patient On (Oxygen Delivery Method): room air        CAPILLARY BLOOD GLUCOSE      POCT Blood Glucose.: 156 mg/dL (08 Apr 2025 08:25)  POCT Blood Glucose.: 228 mg/dL (07 Apr 2025 21:45)  POCT Blood Glucose.: 147 mg/dL (07 Apr 2025 17:17)  POCT Blood Glucose.: 151 mg/dL (07 Apr 2025 12:08)    I&O's Summary    07 Apr 2025 07:01  -  08 Apr 2025 07:00  --------------------------------------------------------  IN: 500 mL / OUT: 1300 mL / NET: -800 mL        PHYSICAL EXAM:  GENERAL: NAD  HEAD:  Atraumatic, Normocephalic  EYES: EOMI, conjunctiva and sclera clear  CHEST/LUNG: Clear to auscultation bilaterally; No rales, rhonchi, wheezing, or rubs  HEART: Regular rate and rhythm; No murmurs, rubs, or gallops  ABDOMEN: Soft, Nontender, Nondistended;   SKIN: No rashes or lesions  NERVOUS SYSTEM:  Alert & Oriented X person and place. Equal strength of UE/LE. Raises each leg off bed, ankle strength intact.     LABS:                      IMAGING:    [X] All pertinent imaging reviewed by me

## 2025-04-08 NOTE — PROGRESS NOTE ADULT - ASSESSMENT
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 Clinically improved pending [facility] placement

## 2025-04-08 NOTE — PROGRESS NOTE ADULT - TIME BILLING
Time-based billing (NON-critical care).     44 minutes spent on total encounter; more than 50% of the visit was spent counseling and / or coordinating care by the attending physician.  The necessity of the time spent during the encounter on this date of service was due to:     review of laboratory data, radiology results, consultants' recommendations, documentation in [place], discussion with patient
Time-based billing (NON-critical care).     35 minutes spent on total encounter; more than 50% of the visit was spent counseling and / or coordinating care by the attending physician.  The necessity of the time spent during the encounter on this date of service was due to:     review of laboratory data, radiology results, consultants' recommendations, documentation in [place], discussion with patient
Time-based billing (NON-critical care).     37 minutes spent on total encounter; more than 50% of the visit was spent counseling and / or coordinating care by the attending physician.  The necessity of the time spent during the encounter on this date of service was due to:     review of laboratory data, radiology results, consultants' recommendations, documentation in [place], discussion with patient
Time-based billing (NON-critical care).     36 minutes spent on total encounter; more than 50% of the visit was spent counseling and / or coordinating care by the attending physician.  The necessity of the time spent during the encounter on this date of service was due to:     review of laboratory data, radiology results, consultants' recommendations, documentation in [place], discussion with patient

## 2025-04-09 ENCOUNTER — TRANSCRIPTION ENCOUNTER (OUTPATIENT)
Age: 85
End: 2025-04-09

## 2025-04-09 VITALS
HEART RATE: 72 BPM | DIASTOLIC BLOOD PRESSURE: 51 MMHG | SYSTOLIC BLOOD PRESSURE: 114 MMHG | TEMPERATURE: 98 F | RESPIRATION RATE: 18 BRPM | OXYGEN SATURATION: 96 %

## 2025-04-09 LAB
CULTURE RESULTS: SIGNIFICANT CHANGE UP
CULTURE RESULTS: SIGNIFICANT CHANGE UP
GLUCOSE BLDC GLUCOMTR-MCNC: 160 MG/DL — HIGH (ref 70–99)
GLUCOSE BLDC GLUCOMTR-MCNC: 178 MG/DL — HIGH (ref 70–99)
SPECIMEN SOURCE: SIGNIFICANT CHANGE UP
SPECIMEN SOURCE: SIGNIFICANT CHANGE UP

## 2025-04-09 PROCEDURE — 99239 HOSP IP/OBS DSCHRG MGMT >30: CPT

## 2025-04-09 RX ADMIN — BUSPIRONE HYDROCHLORIDE 10 MILLIGRAM(S): 15 TABLET ORAL at 06:05

## 2025-04-09 RX ADMIN — INSULIN LISPRO 1: 100 INJECTION, SOLUTION INTRAVENOUS; SUBCUTANEOUS at 08:28

## 2025-04-09 RX ADMIN — ENOXAPARIN SODIUM 40 MILLIGRAM(S): 100 INJECTION SUBCUTANEOUS at 06:05

## 2025-04-09 RX ADMIN — Medication 25 MICROGRAM(S): at 05:05

## 2025-04-09 RX ADMIN — LOSARTAN POTASSIUM 25 MILLIGRAM(S): 100 TABLET, FILM COATED ORAL at 06:05

## 2025-04-09 RX ADMIN — Medication 4 MILLILITER(S): at 10:03

## 2025-04-09 RX ADMIN — DEXTROMETHORPHAN HBR, GUAIFENESIN 1200 MILLIGRAM(S): 200 LIQUID ORAL at 06:05

## 2025-04-09 RX ADMIN — VENLAFAXINE HYDROCHLORIDE 225 MILLIGRAM(S): 37.5 CAPSULE, EXTENDED RELEASE ORAL at 11:46

## 2025-04-09 RX ADMIN — INSULIN LISPRO 1: 100 INJECTION, SOLUTION INTRAVENOUS; SUBCUTANEOUS at 12:06

## 2025-04-09 RX ADMIN — Medication 1 SPRAY(S): at 06:05

## 2025-04-09 RX ADMIN — Medication 1 APPLICATION(S): at 11:46

## 2025-04-09 RX ADMIN — Medication 100 MILLIGRAM(S): at 06:05

## 2025-04-09 RX ADMIN — Medication 20 MILLIGRAM(S): at 06:05

## 2025-04-09 NOTE — PROGRESS NOTE ADULT - PROBLEM SELECTOR PLAN 7
-c/w buspirone, effexor

## 2025-04-09 NOTE — DISCHARGE NOTE NURSING/CASE MANAGEMENT/SOCIAL WORK - NSDCFUADDAPPT_GEN_ALL_CORE_FT
APPTS ARE READY TO BE MADE: [X] YES    Best Family or Patient Contact (if needed):    Additional Information about above appointments (if needed):    1: PCP nathalie  2: Dr Hackett transitions of care televisit  3:     Other comments or requests:

## 2025-04-09 NOTE — DISCHARGE NOTE NURSING/CASE MANAGEMENT/SOCIAL WORK - FINANCIAL ASSISTANCE
[facility] provides services at a reduced cost to those who are determined to be eligible through [facility]’s financial assistance program. Information regarding [facility]’s financial assistance program can be found by going to [email or web address] or by calling [phone].
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
## 2025-04-09 NOTE — PROGRESS NOTE ADULT - PROBLEM SELECTOR PLAN 2
pt endorsing progressive lower extremity weakness x 1 week w/ associated fatigue, more consistent with whole body generalized weakness on exam  old R basal ganglia infarct on prior CTH  -i/s/o infection - etiology include UTI and URI    Pt had prior admission in 10/2024 for UTI with similar presentation of fatigue and generalized weakness.  Low magnesium at [facility] ED.  +entero/rhinovirus  UA with +blood and WBC,     urine cx ngtd  blood cx ngtd  completed course of cftx x 3days  PT consult: rec A[facility]LEE
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
## 2025-04-09 NOTE — DISCHARGE NOTE NURSING/CASE MANAGEMENT/SOCIAL WORK - PATIENT PORTAL LINK FT
You can access the FollowMyHealth Patient Portal offered by [facility] by registering at the following website: [email or web address]. By joining [employer] [facility]’s FollowMyHealth portal, you will also be able to view your health information using other applications (apps) compatible with our system.

## 2025-04-09 NOTE — PROGRESS NOTE ADULT - SUBJECTIVE AND OBJECTIVE BOX
Teams Marcelino Santizo PGY 1 MD    SUBJECTIVE / OVERNIGHT EVENTS:  - Pt seen and examined at bedside  - GUILLE    MEDICATIONS  (STANDING):  atorvastatin 40 milliGRAM(s) Oral at bedtime  busPIRone 10 milliGRAM(s) Oral two times a day  chlorhexidine 2% Cloths 1 Application(s) Topical daily  dextrose 5%. 1000 milliLiter(s) (100 mL/Hr) IV Continuous <Continuous>  dextrose 5%. 1000 milliLiter(s) (50 mL/Hr) IV Continuous <Continuous>  dextrose 50% Injectable 25 Gram(s) IV Push once  dextrose 50% Injectable 12.5 Gram(s) IV Push once  dextrose 50% Injectable 25 Gram(s) IV Push once  enoxaparin Injectable 40 milliGRAM(s) SubCutaneous every 24 hours  famotidine    Tablet 20 milliGRAM(s) Oral two times a day  gabapentin 400 milliGRAM(s) Oral at bedtime  glucagon  Injectable 1 milliGRAM(s) IntraMuscular once  guaiFENesin ER 1200 milliGRAM(s) Oral every 12 hours  influenza  Vaccine (HIGH DOSE) 0.5 milliLiter(s) IntraMuscular once  insulin lispro (ADMELOG) corrective regimen sliding scale   SubCutaneous three times a day before meals  insulin lispro (ADMELOG) corrective regimen sliding scale   SubCutaneous at bedtime  levothyroxine 25 MICROGram(s) Oral daily  losartan 25 milliGRAM(s) Oral daily  sodium chloride 0.65% Nasal 1 Spray(s) Both Nostrils two times a day  sodium chloride 3%  Inhalation 4 milliLiter(s) Inhalation every 12 hours  venlafaxine XR. 225 milliGRAM(s) Oral daily    MEDICATIONS  (PRN):  acetaminophen     Tablet .. 650 milliGRAM(s) Oral every 6 hours PRN Temp greater or equal to 38C (100.4F), Mild Pain (1 - 3)  aluminum hydroxide/magnesium hydroxide/simethicone Suspension 30 milliLiter(s) Oral every 4 hours PRN Dyspepsia  benzonatate 100 milliGRAM(s) Oral three times a day PRN Cough  dextrose Oral Gel 15 Gram(s) Oral once PRN Blood Glucose LESS THAN 70 milliGRAM(s)/deciliter  melatonin 3 milliGRAM(s) Oral at bedtime PRN Insomnia  ondansetron Injectable 4 milliGRAM(s) IV Push every 8 hours PRN Nausea and/or Vomiting        04-08-25 @ 07:01  -  04-09-25 @ 07:00  --------------------------------------------------------  IN: 0 mL / OUT: 900 mL / NET: -900 mL        PHYSICAL EXAM:  Vital Signs Last 24 Hrs  T(C): 36.6 (09 Apr 2025 05:07), Max: 36.6 (08 Apr 2025 21:12)  T(F): 97.8 (09 Apr 2025 05:07), Max: 97.9 (08 Apr 2025 21:12)  HR: 63 (09 Apr 2025 05:07) (63 - 72)  BP: 109/65 (09 Apr 2025 05:07) (109/65 - 115/67)  BP(mean): --  RR: 17 (09 Apr 2025 05:07) (17 - 18)  SpO2: 97% (09 Apr 2025 05:07) (91% - 97%)    Parameters below as of 09 Apr 2025 05:07  Patient On (Oxygen Delivery Method): room air        CAPILLARY BLOOD GLUCOSE      POCT Blood Glucose.: 169 mg/dL (08 Apr 2025 21:35)  POCT Blood Glucose.: 173 mg/dL (08 Apr 2025 17:04)  POCT Blood Glucose.: 224 mg/dL (08 Apr 2025 12:16)  POCT Blood Glucose.: 156 mg/dL (08 Apr 2025 08:25)    I&O's Summary    08 Apr 2025 07:01  -  09 Apr 2025 07:00  --------------------------------------------------------  IN: 0 mL / OUT: 900 mL / NET: -900 mL      PHYSICAL EXAM:  GENERAL: NAD  HEAD:  Atraumatic, Normocephalic  EYES: EOMI, conjunctiva and sclera clear  CHEST/LUNG: Clear to auscultation bilaterally; No rales, rhonchi, wheezing, or rubs  HEART: Regular rate and rhythm; No murmurs, rubs, or gallops  ABDOMEN: Soft, Nontender, Nondistended;   SKIN: No rashes or lesions  NERVOUS SYSTEM:  Alert & Oriented X person and place. Equal strength of UE/LE. Raises each leg off bed, ankle strength intact.     LABS:                      IMAGING:    [X] All pertinent imaging reviewed by me

## 2025-04-22 NOTE — H&P PST ADULT - RX
Please approve the refill of sertraline and lisinopril as I am current out of these medication and don’t have an appointment until May 1st   
Cpap " I don't use CPAP, it's uncomfortable"

## 2025-05-22 NOTE — DIETITIAN INITIAL EVALUATION ADULT - NUTRITION DIAGNOSITC TERMINOLOGY #1
arteries, and probably no segmental pulmonary embolism. The segmental and subsegmental pulmonary arteries are suboptimally evaluated. A sub-6 mm nodule in the subpleural right lower lobe requires no follow-up. There is subsegmental atelectasis in the lower lobes. The lungs are otherwise clear. The central airways are patent. No pneumothorax or pleural effusion. The heart size is normal. Coronary artery calcification is mild in the LAD and RCA. No thoracic lymphadenopathy. Abdomen: Post Whipple, with head pancreatectomy, distal gastrectomy and duodenectomy, pancreaticojejunostomy, hepaticojejunostomy, cholecystectomy, and gastrojejunostomy. Pneumobilia in the CBD, left intrahepatic ducts, and cystic duct remnant is expected after hepaticojejunostomy. There is extensive pancreatic parenchymal atrophy and calcification. Incidental note is made of small hepatic cysts. The esophagus, spleen, and adrenals are normal. Urography: A small left upper pole renal calculus is nonobstructing. Right ureterectasis and mild hydronephrosis is of unclear etiology. No obstructing urinary calculus. Urothelial enhancement in the collecting system and proximal ureter suggests pyelitis and ureteritis/ascending infection. A small right renal cyst requires no follow-up. Incidental note is made of a midline, posterior diverticulum. The bladder is otherwise normal. Pelvis: An abdominal aortic aneurysm at the level of the ANTONIA origin measures 39 x 40 mm. A right common iliac artery aneurysm measures 18 mm. The small bowel, ileocecal junction, appendix, and colon are normal. No free air or fluid, and no abdominopelvic lymphadenopathy. Incidental note is made of lumbar dextroconvexity and bilateral L5 pars interarticularis defects.     1. Possible right pyelitis and ureteritis/ascending infection. Correlation with urinalysis and culture recommended. 2. AAA (39 x 40 mm). Right common iliac artery aneurysm (18 mm). 22Q Electronically signed by Javier  Altered Nutrition Related Lab Values

## 2025-06-12 ENCOUNTER — APPOINTMENT (OUTPATIENT)
Dept: HOME HEALTH SERVICES | Facility: HOME HEALTH | Age: 85
End: 2025-06-12
Payer: MEDICARE

## 2025-06-12 VITALS
SYSTOLIC BLOOD PRESSURE: 122 MMHG | RESPIRATION RATE: 18 BRPM | DIASTOLIC BLOOD PRESSURE: 79 MMHG | HEART RATE: 80 BPM | TEMPERATURE: 98 F | OXYGEN SATURATION: 96 %

## 2025-06-12 VITALS — WEIGHT: 148 LBS | BODY MASS INDEX: 26.22 KG/M2 | HEIGHT: 63 IN

## 2025-06-12 PROBLEM — R39.81 FUNCTIONAL INCONTINENCE: Status: ACTIVE | Noted: 2025-06-12

## 2025-06-12 PROBLEM — M54.50 CHRONIC BILATERAL LOW BACK PAIN: Status: ACTIVE | Noted: 2017-02-27

## 2025-06-12 PROBLEM — Z86.79 HISTORY OF CORONARY ARTERY DISEASE: Status: RESOLVED | Noted: 2025-06-12 | Resolved: 2025-06-12

## 2025-06-12 PROBLEM — D50.9 IRON DEFICIENCY ANEMIA: Status: ACTIVE | Noted: 2025-06-12

## 2025-06-12 PROBLEM — Z71.89 ACP (ADVANCE CARE PLANNING): Status: ACTIVE | Noted: 2025-06-12

## 2025-06-12 PROBLEM — G31.9 DIFFUSE CEREBRAL ATROPHY: Status: ACTIVE | Noted: 2025-06-12

## 2025-06-12 PROBLEM — K80.20 CHOLELITHIASIS: Status: ACTIVE | Noted: 2025-06-12

## 2025-06-12 PROBLEM — G31.84 MILD COGNITIVE IMPAIRMENT: Status: ACTIVE | Noted: 2025-06-12

## 2025-06-12 PROBLEM — Z09 HOSPITAL DISCHARGE FOLLOW-UP: Status: RESOLVED | Noted: 2019-08-21 | Resolved: 2025-06-12

## 2025-06-12 PROBLEM — E78.5 HLD (HYPERLIPIDEMIA): Status: ACTIVE | Noted: 2025-06-12

## 2025-06-12 PROBLEM — Z23 ENCOUNTER FOR IMMUNIZATION: Status: RESOLVED | Noted: 2021-06-02 | Resolved: 2025-06-12

## 2025-06-12 PROCEDURE — 99497 ADVNCD CARE PLAN 30 MIN: CPT | Mod: 2W

## 2025-06-12 PROCEDURE — G0506: CPT | Mod: 2W

## 2025-06-12 PROCEDURE — 99345 HOME/RES VST NEW HIGH MDM 75: CPT | Mod: 25,2W

## 2025-06-12 RX ORDER — ASPIRIN 81 MG/1
81 TABLET, CHEWABLE ORAL DAILY
Refills: 0 | Status: ACTIVE | COMMUNITY
Start: 2025-06-12

## 2025-06-12 RX ORDER — CALCIUM CARBONATE/VITAMIN D3 500MG-5MCG
500-5 TABLET ORAL
Qty: 90 | Refills: 3 | Status: ACTIVE | COMMUNITY
Start: 2025-06-12 | End: 1900-01-01

## 2025-06-16 ENCOUNTER — LABORATORY RESULT (OUTPATIENT)
Age: 85
End: 2025-06-16

## 2025-06-18 LAB
25(OH)D3 SERPL-MCNC: 35.5 NG/ML
ALBUMIN SERPL ELPH-MCNC: 4.2 G/DL
ALP BLD-CCNC: 78 U/L
ALT SERPL-CCNC: 13 U/L
ANION GAP SERPL CALC-SCNC: 15 MMOL/L
AST SERPL-CCNC: 19 U/L
BASOPHILS # BLD AUTO: 0.07 K/UL
BASOPHILS NFR BLD AUTO: 0.9 %
BILIRUB SERPL-MCNC: 0.4 MG/DL
BUN SERPL-MCNC: 14 MG/DL
CALCIUM SERPL-MCNC: 10.3 MG/DL
CHLORIDE SERPL-SCNC: 101 MMOL/L
CHOLEST SERPL-MCNC: 127 MG/DL
CO2 SERPL-SCNC: 24 MMOL/L
CREAT SERPL-MCNC: 0.97 MG/DL
EGFRCR SERPLBLD CKD-EPI 2021: 58 ML/MIN/1.73M2
EOSINOPHIL # BLD AUTO: 0.19 K/UL
EOSINOPHIL NFR BLD AUTO: 2.4 %
GLUCOSE SERPL-MCNC: 207 MG/DL
HCT VFR BLD CALC: 39.8 %
HDLC SERPL-MCNC: 58 MG/DL
HGB BLD-MCNC: 12.1 G/DL
IMM GRANULOCYTES NFR BLD AUTO: 0.3 %
LDLC SERPL-MCNC: 46 MG/DL
LYMPHOCYTES # BLD AUTO: 3.68 K/UL
LYMPHOCYTES NFR BLD AUTO: 46.7 %
MAGNESIUM SERPL-MCNC: 1.9 MG/DL
MAN DIFF?: NORMAL
MCHC RBC-ENTMCNC: 26.7 PG
MCHC RBC-ENTMCNC: 30.4 G/DL
MCV RBC AUTO: 87.7 FL
MONOCYTES # BLD AUTO: 0.47 K/UL
MONOCYTES NFR BLD AUTO: 6 %
NEUTROPHILS # BLD AUTO: 3.45 K/UL
NEUTROPHILS NFR BLD AUTO: 43.7 %
NONHDLC SERPL-MCNC: 69 MG/DL
PLATELET # BLD AUTO: 305 K/UL
POTASSIUM SERPL-SCNC: 5 MMOL/L
PROT SERPL-MCNC: 7.1 G/DL
RBC # BLD: 4.54 M/UL
RBC # FLD: 15.1 %
SODIUM SERPL-SCNC: 140 MMOL/L
TRIGL SERPL-MCNC: 138 MG/DL
WBC # FLD AUTO: 7.88 K/UL

## 2025-07-11 ENCOUNTER — TRANSCRIPTION ENCOUNTER (OUTPATIENT)
Age: 85
End: 2025-07-11

## 2025-07-16 ENCOUNTER — TRANSCRIPTION ENCOUNTER (OUTPATIENT)
Age: 85
End: 2025-07-16

## 2025-07-16 RX ORDER — BLOOD-GLUCOSE METER
70 EACH MISCELLANEOUS
Qty: 3 | Refills: 9 | Status: ACTIVE | COMMUNITY
Start: 2025-07-11 | End: 1900-01-01

## 2025-07-16 NOTE — PROGRESS NOTE ADULT - PROBLEM SELECTOR PLAN 3
See call    -pt on metformin, jardiance, januvia outpt as per ED documentation  -hold oral meds and place on sliding scale with meals and at bedtime with fingersticks. -pt febrile to 101.7 in ED with associated cough and weakness  Leukocytosis on arrival in ED here, now resolving  -UA grossly positive for WBC/blood, negative bacteria but positive for yeast-like cells, repeat without yeast cells, possible skin contaminant  -s/p CTX in ED, f/u official UCx and BCx, c/w CTX daily at this time

## 2025-07-18 ENCOUNTER — APPOINTMENT (OUTPATIENT)
Dept: HOME HEALTH SERVICES | Facility: HOME HEALTH | Age: 85
End: 2025-07-18
Payer: MEDICARE

## 2025-07-18 VITALS
SYSTOLIC BLOOD PRESSURE: 150 MMHG | HEART RATE: 81 BPM | DIASTOLIC BLOOD PRESSURE: 85 MMHG | OXYGEN SATURATION: 96 % | RESPIRATION RATE: 16 BRPM

## 2025-07-18 PROBLEM — E83.42 HYPOMAGNESEMIA: Status: RESOLVED | Noted: 2025-06-12 | Resolved: 2025-07-18

## 2025-07-18 PROBLEM — E53.8 VITAMIN B12 DEFICIENCY: Status: RESOLVED | Noted: 2019-08-23 | Resolved: 2025-07-18

## 2025-07-18 PROCEDURE — 99349 HOME/RES VST EST MOD MDM 40: CPT

## 2025-07-21 ENCOUNTER — TRANSCRIPTION ENCOUNTER (OUTPATIENT)
Age: 85
End: 2025-07-21

## 2025-07-31 ENCOUNTER — TRANSCRIPTION ENCOUNTER (OUTPATIENT)
Age: 85
End: 2025-07-31

## 2025-07-31 RX ORDER — LOPERAMIDE HYDROCHLORIDE 2 MG/1
2 CAPSULE ORAL
Qty: 30 | Refills: 1 | Status: ACTIVE | COMMUNITY
Start: 2025-07-31 | End: 1900-01-01

## 2025-09-15 ENCOUNTER — NON-APPOINTMENT (OUTPATIENT)
Age: 85
End: 2025-09-15

## 2025-09-15 ENCOUNTER — TRANSCRIPTION ENCOUNTER (OUTPATIENT)
Age: 85
End: 2025-09-15

## 2025-09-16 ENCOUNTER — APPOINTMENT (OUTPATIENT)
Dept: HOME HEALTH SERVICES | Facility: HOME HEALTH | Age: 85
End: 2025-09-16